# Patient Record
Sex: FEMALE | Race: WHITE | Employment: FULL TIME | URBAN - METROPOLITAN AREA
[De-identification: names, ages, dates, MRNs, and addresses within clinical notes are randomized per-mention and may not be internally consistent; named-entity substitution may affect disease eponyms.]

---

## 2017-02-27 ENCOUNTER — TELEPHONE (OUTPATIENT)
Dept: OBGYN CLINIC | Facility: CLINIC | Age: 32
End: 2017-02-27

## 2017-02-27 ENCOUNTER — OFFICE VISIT (OUTPATIENT)
Dept: INTERNAL MEDICINE CLINIC | Facility: CLINIC | Age: 32
End: 2017-02-27

## 2017-02-27 VITALS
HEART RATE: 71 BPM | SYSTOLIC BLOOD PRESSURE: 127 MMHG | BODY MASS INDEX: 40.32 KG/M2 | TEMPERATURE: 98 F | WEIGHT: 266 LBS | DIASTOLIC BLOOD PRESSURE: 85 MMHG | HEIGHT: 68 IN

## 2017-02-27 DIAGNOSIS — E66.01 MORBID OBESITY DUE TO EXCESS CALORIES (HCC): Primary | ICD-10-CM

## 2017-02-27 PROCEDURE — 99212 OFFICE O/P EST SF 10 MIN: CPT | Performed by: INTERNAL MEDICINE

## 2017-02-27 PROCEDURE — 99213 OFFICE O/P EST LOW 20 MIN: CPT | Performed by: INTERNAL MEDICINE

## 2017-02-27 NOTE — PATIENT INSTRUCTIONS
1800 calories or less  Small frequent feedings  210 minutes a week cardio  50 grams dietery protein a day minimum  Vegetables free food  Apples, oranges, carrots  Broccoli, caulifloer, cabbage, unlimited

## 2017-02-27 NOTE — PROGRESS NOTES
Pt wants counseling on wt loss  We talked for 25 minutes on basic principals  See the patient instructions  1.  Morbid obesity due to excess calories ([facility] [address]  The patient will rtc 4 weeks for monitoring  - DIETITIAN EDUCATION INITIAL, DIET (INTERNAL)

## 2017-02-27 NOTE — TELEPHONE ENCOUNTER
Pt has not had an annual since 2015. Dr. Paige Lopez will need to see pt for annual before she will give mammo order. Please help pt schedule annual. Thank you.

## 2017-03-04 ENCOUNTER — HOSPITAL ENCOUNTER (OUTPATIENT)
Age: 32
Discharge: HOME | End: 2017-03-04
Attending: EMERGENCY MEDICINE
Payer: COMMERCIAL

## 2017-03-04 VITALS
HEIGHT: 68 IN | HEART RATE: 66 BPM | TEMPERATURE: 98 F | WEIGHT: 260 LBS | DIASTOLIC BLOOD PRESSURE: 93 MMHG | RESPIRATION RATE: 16 BRPM | BODY MASS INDEX: 39.4 KG/M2 | OXYGEN SATURATION: 100 % | SYSTOLIC BLOOD PRESSURE: 152 MMHG

## 2017-03-04 DIAGNOSIS — K52.9 GASTROENTERITIS: Primary | ICD-10-CM

## 2017-03-04 PROCEDURE — 99212 OFFICE O/P EST SF 10 MIN: CPT

## 2017-03-04 PROCEDURE — 99213 OFFICE O/P EST LOW 20 MIN: CPT

## 2017-03-05 NOTE — ED PROVIDER NOTES
Patient Seen in: [facility]    History   Patient presents with:  Nausea/Vomiting/Diarrhea (gastrointestinal)    Stated Complaint: V/D LAST 3 DAYS ABDOMINAL PAIN    HPI  3 days ago patient started with vomiting and diarrhe ED Triage Vitals   BP 03/04/17 1746 152/93 mmHg   Pulse 03/04/17 1746 66   Resp 03/04/17 1746 16   Temp 03/04/17 1746 97.7 °F (36.5 °C)   Temp src 03/04/17 1746 Temporal   SpO2 03/04/17 1746 100 %   O2 Device 03/04/17 1746 None (Room air)       Current as soon as possible for a visit in 1 week  Follow up with your doctor is important, For follow-up      Medications Prescribed:  Current Discharge Medication List

## 2017-03-23 ENCOUNTER — OFFICE VISIT (OUTPATIENT)
Dept: OBGYN CLINIC | Facility: CLINIC | Age: 32
End: 2017-03-23

## 2017-03-23 ENCOUNTER — TELEPHONE (OUTPATIENT)
Dept: OBGYN CLINIC | Facility: CLINIC | Age: 32
End: 2017-03-23

## 2017-03-23 VITALS
HEART RATE: 67 BPM | WEIGHT: 265.81 LBS | BODY MASS INDEX: 40 KG/M2 | DIASTOLIC BLOOD PRESSURE: 92 MMHG | SYSTOLIC BLOOD PRESSURE: 142 MMHG

## 2017-03-23 DIAGNOSIS — Z85.42 HISTORY OF ENDOMETRIAL CANCER: Primary | ICD-10-CM

## 2017-03-23 DIAGNOSIS — Z80.9 FAMILY HISTORY OF CANCER: ICD-10-CM

## 2017-03-23 DIAGNOSIS — Z01.419 ENCOUNTER FOR GYNECOLOGICAL EXAMINATION WITHOUT ABNORMAL FINDING: Primary | ICD-10-CM

## 2017-03-23 DIAGNOSIS — N64.4 BREAST PAIN: ICD-10-CM

## 2017-03-23 DIAGNOSIS — Z85.42 HISTORY OF ENDOMETRIAL CANCER: ICD-10-CM

## 2017-03-23 PROCEDURE — 99395 PREV VISIT EST AGE 18-39: CPT | Performed by: OBSTETRICS & GYNECOLOGY

## 2017-03-23 PROCEDURE — 99212 OFFICE O/P EST SF 10 MIN: CPT | Performed by: OBSTETRICS & GYNECOLOGY

## 2017-03-23 NOTE — PROGRESS NOTES
Selwyn Peguero is a 33year old female  No LMP recorded. Patient has had a hysterectomy. Patient presents with:  Gyn Exam: Annual  She has c/o right breast pain un upper outer quad.   She is concerned due to her family history of premanopausal b by mouth every morning before breakfast., Disp: 30 capsule, Rfl: 2    ALLERGIES:    Penicillins                 Comment:Other reaction(s): PENICILLIN G POTASSIUM      Review of Systems:  Constitutional:  Denies fatigue, night sweats, hot flashes  Eyes:  de discharge  Cervix:  Normal without tenderness on motion  Uterus: normal in size, contour, position, mobility, without tenderness  Adnexa: normal without masses or tenderness  Perineum: normal  Anus: no hemorroids     Assessment & Plan:    Encounter for gyn

## 2017-03-23 NOTE — TELEPHONE ENCOUNTER
please refer for genetic testing - Rustam Davidson. She has HMO-I. She has had premenopausal endometrial cancer and has strong family history of breast cancer.

## 2017-03-24 NOTE — TELEPHONE ENCOUNTER
Joce. Patient informed her referral is in process and she can call [phone] to schedule a consult with Hoda Rosales in [facility] counseling. Please relay info.

## 2017-04-05 ENCOUNTER — OFFICE VISIT (OUTPATIENT)
Dept: INTERNAL MEDICINE CLINIC | Facility: CLINIC | Age: 32
End: 2017-04-05

## 2017-04-05 VITALS
OXYGEN SATURATION: 96 % | DIASTOLIC BLOOD PRESSURE: 89 MMHG | WEIGHT: 264.81 LBS | TEMPERATURE: 98 F | HEIGHT: 68 IN | BODY MASS INDEX: 40.13 KG/M2 | SYSTOLIC BLOOD PRESSURE: 128 MMHG | HEART RATE: 84 BPM

## 2017-04-05 DIAGNOSIS — I10 ESSENTIAL HYPERTENSION WITH GOAL BLOOD PRESSURE LESS THAN 140/90: ICD-10-CM

## 2017-04-05 DIAGNOSIS — K21.9 GASTROESOPHAGEAL REFLUX DISEASE, ESOPHAGITIS PRESENCE NOT SPECIFIED: Primary | ICD-10-CM

## 2017-04-05 DIAGNOSIS — J06.9 UPPER RESPIRATORY TRACT INFECTION, UNSPECIFIED TYPE: ICD-10-CM

## 2017-04-05 PROCEDURE — 99213 OFFICE O/P EST LOW 20 MIN: CPT | Performed by: INTERNAL MEDICINE

## 2017-04-05 PROCEDURE — 99212 OFFICE O/P EST SF 10 MIN: CPT | Performed by: INTERNAL MEDICINE

## 2017-04-05 RX ORDER — OMEPRAZOLE 20 MG/1
20 CAPSULE, DELAYED RELEASE ORAL
Qty: 30 CAPSULE | Refills: 2 | Status: SHIPPED | OUTPATIENT
Start: 2017-04-05 | End: 2017-12-15

## 2017-04-05 RX ORDER — NIFEDIPINE 90 MG/1
TABLET, FILM COATED, EXTENDED RELEASE ORAL
Qty: 90 TABLET | Refills: 0 | Status: SHIPPED | OUTPATIENT
Start: 2017-04-05 | End: 2017-12-04

## 2017-04-20 ENCOUNTER — HOSPITAL ENCOUNTER (OUTPATIENT)
Dept: MAMMOGRAPHY | Facility: HOSPITAL | Age: 32
Discharge: HOME | End: 2017-04-20
Attending: OBSTETRICS & GYNECOLOGY
Payer: COMMERCIAL

## 2017-04-20 DIAGNOSIS — N64.4 BREAST PAIN: ICD-10-CM

## 2017-04-20 DIAGNOSIS — Z80.9 FAMILY HISTORY OF CANCER: ICD-10-CM

## 2017-04-20 DIAGNOSIS — Z85.42 HISTORY OF ENDOMETRIAL CANCER: ICD-10-CM

## 2017-04-20 PROCEDURE — 77066 DX MAMMO INCL CAD BI: CPT | Performed by: RADIOLOGY

## 2017-11-20 ENCOUNTER — OFFICE VISIT (OUTPATIENT)
Dept: INTERNAL MEDICINE CLINIC | Facility: CLINIC | Age: 32
End: 2017-11-20

## 2017-11-20 VITALS
HEART RATE: 86 BPM | DIASTOLIC BLOOD PRESSURE: 101 MMHG | BODY MASS INDEX: 43 KG/M2 | SYSTOLIC BLOOD PRESSURE: 156 MMHG | WEIGHT: 282 LBS

## 2017-11-20 DIAGNOSIS — G44.89 OTHER HEADACHE SYNDROME: ICD-10-CM

## 2017-11-20 DIAGNOSIS — I10 ESSENTIAL HYPERTENSION WITH GOAL BLOOD PRESSURE LESS THAN 140/90: Primary | ICD-10-CM

## 2017-11-20 DIAGNOSIS — R20.0 LEG NUMBNESS: ICD-10-CM

## 2017-11-20 PROCEDURE — 99212 OFFICE O/P EST SF 10 MIN: CPT | Performed by: INTERNAL MEDICINE

## 2017-11-20 PROCEDURE — 99214 OFFICE O/P EST MOD 30 MIN: CPT | Performed by: INTERNAL MEDICINE

## 2017-11-20 RX ORDER — LISINOPRIL AND HYDROCHLOROTHIAZIDE 25; 20 MG/1; MG/1
1 TABLET ORAL DAILY
Qty: 90 TABLET | Refills: 3 | Status: SHIPPED | OUTPATIENT
Start: 2017-11-20 | End: 2018-11-20

## 2017-11-20 NOTE — PROGRESS NOTES
Lynnda Felty is a 27year old female. HPI:   1. Essential hypertension with goal blood pressure less than 140/90  Patient has been following low salt diet and has been taking anti-hypertensive prescriptions as prescribed.  Blood pressure has been kat 42.88 kg/m²   GENERAL: well developed, well nourished,in no apparent distress  SKIN: no rashes,no suspicious lesions  HEENT: atraumatic, normocephalic,ears and throat are clear  NECK: supple,no adenopathy,no bruits  LUNGS: clear to auscultation  CARDIO: RR

## 2017-12-04 ENCOUNTER — OFFICE VISIT (OUTPATIENT)
Dept: INTERNAL MEDICINE CLINIC | Facility: CLINIC | Age: 32
End: 2017-12-04

## 2017-12-04 VITALS
BODY MASS INDEX: 42.28 KG/M2 | SYSTOLIC BLOOD PRESSURE: 129 MMHG | RESPIRATION RATE: 20 BRPM | DIASTOLIC BLOOD PRESSURE: 82 MMHG | HEART RATE: 83 BPM | WEIGHT: 279 LBS | HEIGHT: 68 IN | TEMPERATURE: 98 F

## 2017-12-04 DIAGNOSIS — C55 UTERINE CARCINOMA (HCC): ICD-10-CM

## 2017-12-04 DIAGNOSIS — R73.9 ELEVATED BLOOD SUGAR: ICD-10-CM

## 2017-12-04 DIAGNOSIS — Z00.00 PE (PHYSICAL EXAM), ROUTINE: Primary | ICD-10-CM

## 2017-12-04 DIAGNOSIS — E66.9 OBESITY (BMI 30-39.9): ICD-10-CM

## 2017-12-04 DIAGNOSIS — I10 ESSENTIAL HYPERTENSION WITH GOAL BLOOD PRESSURE LESS THAN 140/90: ICD-10-CM

## 2017-12-04 PROCEDURE — 99395 PREV VISIT EST AGE 18-39: CPT | Performed by: INTERNAL MEDICINE

## 2017-12-04 PROCEDURE — 99213 OFFICE O/P EST LOW 20 MIN: CPT | Performed by: INTERNAL MEDICINE

## 2017-12-04 PROCEDURE — 99212 OFFICE O/P EST SF 10 MIN: CPT | Performed by: INTERNAL MEDICINE

## 2017-12-04 NOTE — PROGRESS NOTES
HPI:    Patient ID: Ajith Cárdenas is a 27year old female.   Patient patient presents today for physical exam, states doing well otherwise, denies chest pain, shortness of breath, dyspnea on exertion or heart palpitations also denies nausea, vomiting, morning before breakfast. Disp: 30 capsule Rfl: 2     Allergies:  Penicillins                 Comment:Other reaction(s): PENICILLIN G POTASSIUM   PHYSICAL EXAM:   Physical Exam   Constitutional: She is oriented to person, place, and time.  She appears well- cardiovascular exercise /walking as tolerated   Complete labs as ordered , preventative health maintenance discussed -pelvic breast exam  With gyne hx uterine  Ca - hx alex and baker stable  Seeing Dr Blair  Patient verbalized understanding of all instructions

## 2017-12-15 DIAGNOSIS — K21.9 GASTROESOPHAGEAL REFLUX DISEASE, ESOPHAGITIS PRESENCE NOT SPECIFIED: ICD-10-CM

## 2017-12-15 RX ORDER — OMEPRAZOLE 20 MG/1
CAPSULE, DELAYED RELEASE ORAL
Qty: 30 CAPSULE | Refills: 1 | Status: SHIPPED | OUTPATIENT
Start: 2017-12-15 | End: 2018-02-27

## 2017-12-15 NOTE — TELEPHONE ENCOUNTER
Refill Protocol Appointment Criteria  · Appointment scheduled in the past 12 months or in the next 3 months  Recent Outpatient Visits            1 week ago PE (physical exam), routine    Cat Blandon MD

## 2017-12-26 ENCOUNTER — NURSE TRIAGE (OUTPATIENT)
Dept: INTERNAL MEDICINE CLINIC | Facility: CLINIC | Age: 32
End: 2017-12-26

## 2017-12-26 ENCOUNTER — TELEPHONE (OUTPATIENT)
Dept: INTERNAL MEDICINE CLINIC | Facility: CLINIC | Age: 32
End: 2017-12-26

## 2017-12-26 RX ORDER — ACYCLOVIR 50 MG/G
CREAM TOPICAL
Qty: 1 TUBE | Refills: 0 | Status: SHIPPED | OUTPATIENT
Start: 2017-12-26 | End: 2018-07-17 | Stop reason: ALTCHOICE

## 2017-12-26 NOTE — TELEPHONE ENCOUNTER
Action Requested: Summary for Provider     []  Critical Lab, Recommendations Needed  [] Need Additional Advice  []   FYI    []   Need Orders  [x] Need Medications Sent to Pharmacy  []  Other     SUMMARY: MED NEEDED, cold sore    Pt states she has a cold so

## 2017-12-26 NOTE — TELEPHONE ENCOUNTER
Notified pt's mom prescription sent ot pharmacy as requested and to have pt call back if any questions.

## 2017-12-28 ENCOUNTER — NURSE TRIAGE (OUTPATIENT)
Dept: OTHER | Age: 32
End: 2017-12-28

## 2017-12-28 NOTE — TELEPHONE ENCOUNTER
Noted - keep apt  On 1/2 - fluids avoid constipation and heavy meals -  If any worsening  sy - IC or ER

## 2018-01-02 ENCOUNTER — OFFICE VISIT (OUTPATIENT)
Dept: INTERNAL MEDICINE CLINIC | Facility: CLINIC | Age: 33
End: 2018-01-02

## 2018-01-02 VITALS
SYSTOLIC BLOOD PRESSURE: 143 MMHG | HEIGHT: 68 IN | WEIGHT: 287.88 LBS | RESPIRATION RATE: 20 BRPM | BODY MASS INDEX: 43.63 KG/M2 | TEMPERATURE: 98 F | DIASTOLIC BLOOD PRESSURE: 93 MMHG | HEART RATE: 80 BPM

## 2018-01-02 DIAGNOSIS — I10 ESSENTIAL HYPERTENSION WITH GOAL BLOOD PRESSURE LESS THAN 140/90: Primary | ICD-10-CM

## 2018-01-02 DIAGNOSIS — K64.9 HEMORRHOIDS, UNSPECIFIED HEMORRHOID TYPE: ICD-10-CM

## 2018-01-02 DIAGNOSIS — K21.9 GASTROESOPHAGEAL REFLUX DISEASE, ESOPHAGITIS PRESENCE NOT SPECIFIED: ICD-10-CM

## 2018-01-02 DIAGNOSIS — E66.9 OBESITY (BMI 30-39.9): ICD-10-CM

## 2018-01-02 PROCEDURE — 99214 OFFICE O/P EST MOD 30 MIN: CPT | Performed by: INTERNAL MEDICINE

## 2018-01-02 PROCEDURE — 99212 OFFICE O/P EST SF 10 MIN: CPT | Performed by: INTERNAL MEDICINE

## 2018-01-02 NOTE — PROGRESS NOTES
HPI:    Patient ID: Claudean Smoker is a 27year old female.     Rectal Bleeding   This is a new ( few   weeks ago  with  biger BM and was pushing  also had some diarrhea  - for 2- 3  days -   all  sy  resoplved  in 2-3  days    now feewls  good deneis a well-developed and well-nourished. No distress. HENT:   Head: Normocephalic and atraumatic.    Right Ear: Tympanic membrane, external ear and ear canal normal.   Left Ear: Tympanic membrane, external ear and ear canal normal.   Nose: Nose normal. Right si Patient verbalizes understanding  cpm labs in am   recheck   In  Office in 2 weeks     Obesity (bmi 30-39. 9)  Eat healthy, well balanced meals   Reduce daily calorie intake  (1500 calories per day)   Reach and maintain a healthy weight   Reduce salt, fat

## 2018-02-06 ENCOUNTER — OFFICE VISIT (OUTPATIENT)
Dept: INTERNAL MEDICINE CLINIC | Facility: CLINIC | Age: 33
End: 2018-02-06

## 2018-02-06 VITALS
WEIGHT: 277 LBS | HEART RATE: 77 BPM | RESPIRATION RATE: 20 BRPM | HEIGHT: 68 IN | BODY MASS INDEX: 41.98 KG/M2 | DIASTOLIC BLOOD PRESSURE: 89 MMHG | SYSTOLIC BLOOD PRESSURE: 144 MMHG | TEMPERATURE: 97 F | OXYGEN SATURATION: 98 %

## 2018-02-06 DIAGNOSIS — I10 ESSENTIAL HYPERTENSION WITH GOAL BLOOD PRESSURE LESS THAN 140/90: ICD-10-CM

## 2018-02-06 DIAGNOSIS — R05.9 COUGH: Primary | ICD-10-CM

## 2018-02-06 PROCEDURE — 99214 OFFICE O/P EST MOD 30 MIN: CPT | Performed by: INTERNAL MEDICINE

## 2018-02-06 PROCEDURE — 99212 OFFICE O/P EST SF 10 MIN: CPT | Performed by: INTERNAL MEDICINE

## 2018-02-06 RX ORDER — FEXOFENADINE HCL 180 MG/1
180 TABLET ORAL DAILY
Qty: 30 TABLET | Refills: 0 | Status: SHIPPED | OUTPATIENT
Start: 2018-02-06 | End: 2018-07-02 | Stop reason: ALTCHOICE

## 2018-02-06 RX ORDER — AZITHROMYCIN 250 MG/1
TABLET, FILM COATED ORAL
Qty: 6 TABLET | Refills: 0 | Status: SHIPPED | OUTPATIENT
Start: 2018-02-06 | End: 2018-07-02 | Stop reason: ALTCHOICE

## 2018-02-06 NOTE — PROGRESS NOTES
HPI:    Patient ID: Hakeem Jackson is a 27year old female. Cough   This is a new problem. The current episode started in the past 7 days. The problem has been gradually worsening. The cough is productive of sputum.  Associated symptoms include nasal EVERY MORNING BEFORE BREAKFAST Disp: 30 capsule Rfl: 1   Lisinopril-Hydrochlorothiazide 20-25 MG Oral Tab Take 1 tablet by mouth daily. Disp: 90 tablet Rfl: 3   Acetaminophen (TYLENOL EXTRA STRENGTH OR) Take by mouth.  Disp:  Rfl:      Allergies:  Penicilli affect. Her behavior is normal.   Nursing note and vitals reviewed. Blood pressure 144/89, pulse 77, temperature (!) 96.8 °F (36 °C), temperature source Oral, resp.  rate 20, height 5' 8\" (1.727 m), weight 277 lb (125.6 kg), SpO2 98 %, not currently marline

## 2018-02-27 DIAGNOSIS — K21.9 GASTROESOPHAGEAL REFLUX DISEASE, ESOPHAGITIS PRESENCE NOT SPECIFIED: ICD-10-CM

## 2018-02-27 RX ORDER — OMEPRAZOLE 20 MG/1
CAPSULE, DELAYED RELEASE ORAL
Qty: 30 CAPSULE | Refills: 0 | Status: SHIPPED | OUTPATIENT
Start: 2018-02-27 | End: 2018-06-28

## 2018-04-07 ENCOUNTER — NURSE TRIAGE (OUTPATIENT)
Dept: OTHER | Age: 33
End: 2018-04-07

## 2018-04-07 NOTE — TELEPHONE ENCOUNTER
Action Requested: Summary for Provider     []  Critical Lab, Recommendations Needed  [] Need Additional Advice  [x]   FYI    []   Need Orders  [] Need Medications Sent to Pharmacy  []  Other     SUMMARY:   Spoke with pt & stated she was seen by Dr Venkata Jane 2-6-1

## 2018-06-28 DIAGNOSIS — K21.9 GASTROESOPHAGEAL REFLUX DISEASE, ESOPHAGITIS PRESENCE NOT SPECIFIED: ICD-10-CM

## 2018-06-28 RX ORDER — OMEPRAZOLE 20 MG/1
CAPSULE, DELAYED RELEASE ORAL
Qty: 30 CAPSULE | Refills: 2 | Status: SHIPPED | OUTPATIENT
Start: 2018-06-28 | End: 2018-10-25

## 2018-06-28 NOTE — TELEPHONE ENCOUNTER
Refilled per protocol.   Refill Protocol Appointment Criteria  · Appointment scheduled in the past 12 months or in the next 3 months  Recent Outpatient Visits            4 months ago [facility], [facility], [address] [place] [address], Paula Chavez MD

## 2018-07-02 ENCOUNTER — OFFICE VISIT (OUTPATIENT)
Dept: INTERNAL MEDICINE CLINIC | Facility: CLINIC | Age: 33
End: 2018-07-02

## 2018-07-02 ENCOUNTER — NURSE TRIAGE (OUTPATIENT)
Dept: OTHER | Age: 33
End: 2018-07-02

## 2018-07-02 VITALS
TEMPERATURE: 98 F | SYSTOLIC BLOOD PRESSURE: 147 MMHG | HEART RATE: 73 BPM | HEIGHT: 68 IN | DIASTOLIC BLOOD PRESSURE: 84 MMHG | WEIGHT: 280 LBS | BODY MASS INDEX: 42.44 KG/M2

## 2018-07-02 DIAGNOSIS — I10 ESSENTIAL HYPERTENSION WITH GOAL BLOOD PRESSURE LESS THAN 140/90: ICD-10-CM

## 2018-07-02 DIAGNOSIS — S46.912A MUSCLE STRAIN OF LEFT UPPER ARM, INITIAL ENCOUNTER: Primary | ICD-10-CM

## 2018-07-02 PROCEDURE — 99213 OFFICE O/P EST LOW 20 MIN: CPT | Performed by: INTERNAL MEDICINE

## 2018-07-02 PROCEDURE — 99212 OFFICE O/P EST SF 10 MIN: CPT | Performed by: INTERNAL MEDICINE

## 2018-07-02 RX ORDER — CYCLOBENZAPRINE HCL 10 MG
10 TABLET ORAL NIGHTLY
Qty: 5 TABLET | Refills: 0 | Status: SHIPPED | OUTPATIENT
Start: 2018-07-02 | End: 2018-07-17 | Stop reason: ALTCHOICE

## 2018-07-02 NOTE — PROGRESS NOTES
Selwyn Peguero is a 27year old female.   Patient presents with:  Arm Pain: L arm pain began yesterday due to heavy lifting      HPI:   Pt comes as an urgent visit   C/c left arm pain x one day   C/o left arm pain -- pain is 4/10 only if moving it   Too wheezing  CARDIOVASCULAR: denies chest pain on exertion, palpitations, swelling in feet  GI: denies abdominal pain and denies heartburn, nausea or vomiting  MUS: No back pain, joint pain,+ muscle pain- left arm   NEURO: denies headaches , anxiety, depressi

## 2018-07-02 NOTE — TELEPHONE ENCOUNTER
She can take Tylenol 2 tabs 3 times daily for maximum 6 tabs daily. She can take ibuprofen 200 mg 3 tabs, 3 times daily for maximum 9 tabs daily. Ice 20 min 3 times daily  Schedule an appointment with any provider.

## 2018-07-02 NOTE — TELEPHONE ENCOUNTER
Action Requested: Summary for Provider     []  Critical Lab, Recommendations Needed  [x] Need Additional Advice  []   FYI    []   Need Orders  [] Need Medications Sent to Pharmacy  []  Other     SUMMARY: Patient requesting MD advice for left arm pain after Reason for Disposition  • Caused by overuse from recent vigorous activity (e.g., sports, lifting, physical work)    Protocols used: ARM PAIN-A-OH

## 2018-07-02 NOTE — PATIENT INSTRUCTIONS
Advised ibuprofen 400 mg twice to 3 times a day after meals for the next 4 -5 days  Advised patient to get her blood work drawn  We will give a few muscle relaxants only to take at night and she is aware of the side effects and to take it only before she g · Rest the area even if medicines are controlling the pain. Rest  · Rest the injured area by not using it for 24 hours. · When you’re ready, return slowly to your normal activities. Rest the injured area often.   · Don’t use or walk on an injured limb if

## 2018-07-17 ENCOUNTER — OFFICE VISIT (OUTPATIENT)
Dept: INTERNAL MEDICINE CLINIC | Facility: CLINIC | Age: 33
End: 2018-07-17
Payer: COMMERCIAL

## 2018-07-17 VITALS
DIASTOLIC BLOOD PRESSURE: 96 MMHG | WEIGHT: 279 LBS | HEART RATE: 84 BPM | HEIGHT: 68 IN | BODY MASS INDEX: 42.28 KG/M2 | TEMPERATURE: 98 F | SYSTOLIC BLOOD PRESSURE: 145 MMHG

## 2018-07-17 DIAGNOSIS — I10 ESSENTIAL HYPERTENSION WITH GOAL BLOOD PRESSURE LESS THAN 140/90: Primary | ICD-10-CM

## 2018-07-17 DIAGNOSIS — H66.90 ACUTE OTITIS MEDIA, UNSPECIFIED OTITIS MEDIA TYPE: ICD-10-CM

## 2018-07-17 PROCEDURE — 99213 OFFICE O/P EST LOW 20 MIN: CPT | Performed by: INTERNAL MEDICINE

## 2018-07-17 PROCEDURE — 99212 OFFICE O/P EST SF 10 MIN: CPT | Performed by: INTERNAL MEDICINE

## 2018-07-17 RX ORDER — CLINDAMYCIN HYDROCHLORIDE 300 MG/1
300 CAPSULE ORAL 3 TIMES DAILY
Qty: 15 CAPSULE | Refills: 0 | Status: SHIPPED | OUTPATIENT
Start: 2018-07-17 | End: 2018-12-21 | Stop reason: ALTCHOICE

## 2018-07-17 NOTE — PROGRESS NOTES
Jason Floyd is a 27year old female.   Patient presents with:  Neck Pain: x2 days      HPI:   Pt comes as an urgent visit  C/c neck pain   C/o neck pain - left side  Going on 3 days -- last night was the worse and on the first day felt alittle nausea rarely       REVIEW OF SYSTEMS:   GENERAL HEALTH: + fevers, + chills, + sweats, + fatigue  VISION: No recent vision problems, blurry vision or double vision  HEENT: No decreased hearing + left ear pain, no  nasal congestion, + sore throat- more on the left the  also gave number to ENT in case she is not feeling better or she can always follow-up with PCP number for the allergist  Reminded patient to get blood work done        The patient indicates understanding of these issues and agrees to the plan.   No Fol

## 2018-08-23 ENCOUNTER — LAB ENCOUNTER (OUTPATIENT)
Dept: LAB | Age: 33
End: 2018-08-23
Attending: INTERNAL MEDICINE
Payer: COMMERCIAL

## 2018-08-23 DIAGNOSIS — Z00.00 PE (PHYSICAL EXAM), ROUTINE: ICD-10-CM

## 2018-08-23 DIAGNOSIS — I10 ESSENTIAL HYPERTENSION WITH GOAL BLOOD PRESSURE LESS THAN 140/90: ICD-10-CM

## 2018-08-23 DIAGNOSIS — R73.9 ELEVATED BLOOD SUGAR: ICD-10-CM

## 2018-08-23 DIAGNOSIS — C55 UTERINE CARCINOMA (HCC): ICD-10-CM

## 2018-08-23 LAB
ALBUMIN SERPL BCP-MCNC: 4 G/DL (ref 3.5–4.8)
ALBUMIN/GLOB SERPL: 1.2 {RATIO} (ref 1–2)
ALP SERPL-CCNC: 39 U/L (ref 32–100)
ALT SERPL-CCNC: 24 U/L (ref 14–54)
ANION GAP SERPL CALC-SCNC: 7 MMOL/L (ref 0–18)
AST SERPL-CCNC: 16 U/L (ref 15–41)
BASOPHILS # BLD: 0.1 K/UL (ref 0–0.2)
BASOPHILS NFR BLD: 1 %
BILIRUB SERPL-MCNC: 0.6 MG/DL (ref 0.3–1.2)
BUN SERPL-MCNC: 9 MG/DL (ref 8–20)
BUN/CREAT SERPL: 12.9 (ref 10–20)
CALCIUM SERPL-MCNC: 9 MG/DL (ref 8.5–10.5)
CHLORIDE SERPL-SCNC: 105 MMOL/L (ref 95–110)
CHOLEST SERPL-MCNC: 112 MG/DL (ref 110–200)
CO2 SERPL-SCNC: 26 MMOL/L (ref 22–32)
CREAT SERPL-MCNC: 0.7 MG/DL (ref 0.5–1.5)
EOSINOPHIL # BLD: 0.2 K/UL (ref 0–0.7)
EOSINOPHIL NFR BLD: 2 %
ERYTHROCYTE [DISTWIDTH] IN BLOOD BY AUTOMATED COUNT: 13.6 % (ref 11–15)
GLOBULIN PLAS-MCNC: 3.3 G/DL (ref 2.5–3.7)
GLUCOSE SERPL-MCNC: 95 MG/DL (ref 70–99)
HCT VFR BLD AUTO: 37.3 % (ref 35–48)
HDLC SERPL-MCNC: 26 MG/DL
HGB BLD-MCNC: 12.7 G/DL (ref 12–16)
HYALINE CASTS #/AREA URNS AUTO: 8 /LPF
LDLC SERPL DIRECT ASSAY-MCNC: 29 MG/DL (ref 0–99)
LYMPHOCYTES # BLD: 2.1 K/UL (ref 1–4)
LYMPHOCYTES NFR BLD: 25 %
MCH RBC QN AUTO: 29.8 PG (ref 27–32)
MCHC RBC AUTO-ENTMCNC: 34 G/DL (ref 32–37)
MCV RBC AUTO: 87.6 FL (ref 80–100)
MONOCYTES # BLD: 0.5 K/UL (ref 0–1)
MONOCYTES NFR BLD: 6 %
NEUTROPHILS # BLD AUTO: 5.5 K/UL (ref 1.8–7.7)
NEUTROPHILS NFR BLD: 66 %
NONHDLC SERPL-MCNC: 86 MG/DL
OSMOLALITY UR CALC.SUM OF ELEC: 284 MOSM/KG (ref 275–295)
PATIENT FASTING: YES
PLATELET # BLD AUTO: 276 K/UL (ref 140–400)
PMV BLD AUTO: 8 FL (ref 7.4–10.3)
POTASSIUM SERPL-SCNC: 3.6 MMOL/L (ref 3.3–5.1)
PROT SERPL-MCNC: 7.3 G/DL (ref 5.9–8.4)
RBC # BLD AUTO: 4.25 M/UL (ref 3.7–5.4)
RBC #/AREA URNS AUTO: 3 /HPF
SODIUM SERPL-SCNC: 138 MMOL/L (ref 136–144)
TRIGL SERPL-MCNC: 456 MG/DL (ref 1–149)
TSH SERPL-ACNC: 1.41 UIU/ML (ref 0.45–5.33)
WBC # BLD AUTO: 8.4 K/UL (ref 4–11)
WBC #/AREA URNS AUTO: 1 /HPF

## 2018-08-23 PROCEDURE — 80061 LIPID PANEL: CPT

## 2018-08-23 PROCEDURE — 36415 COLL VENOUS BLD VENIPUNCTURE: CPT

## 2018-08-23 PROCEDURE — 80053 COMPREHEN METABOLIC PANEL: CPT

## 2018-08-23 PROCEDURE — 84443 ASSAY THYROID STIM HORMONE: CPT

## 2018-08-23 PROCEDURE — 81015 MICROSCOPIC EXAM OF URINE: CPT

## 2018-08-23 PROCEDURE — 83036 HEMOGLOBIN GLYCOSYLATED A1C: CPT

## 2018-08-23 PROCEDURE — 83721 ASSAY OF BLOOD LIPOPROTEIN: CPT

## 2018-08-23 PROCEDURE — 85025 COMPLETE CBC W/AUTO DIFF WBC: CPT

## 2018-08-24 LAB — HBA1C MFR BLD: 5.6 % (ref 4–6)

## 2018-08-27 ENCOUNTER — TELEPHONE (OUTPATIENT)
Dept: INTERNAL MEDICINE CLINIC | Facility: CLINIC | Age: 33
End: 2018-08-27

## 2018-08-27 RX ORDER — FENOFIBRATE 145 MG/1
145 TABLET, COATED ORAL DAILY
Qty: 90 TABLET | Refills: 0 | Status: SHIPPED | OUTPATIENT
Start: 2018-08-27 | End: 2018-12-21 | Stop reason: ALTCHOICE

## 2018-08-27 NOTE — PROGRESS NOTES
Please call patient with blood test results. Kidney and liver function are normal, no anemia.    Cholesterol is normal -except very elevated triglycerides-that is some worsening 456 at the high risk of pancreatitis inflammation of the pancreatic gland du

## 2018-08-29 NOTE — TELEPHONE ENCOUNTER
Notes recorded by Leslee Delgado RN on 8/28/2018 at 7:22 PM CDT  Fanta Velasquez, notified of new medication sent to pharmacy also  ------    Notes recorded by Celeste Mejia MD on 8/27/2018 at 8:34 AM CDT  Please call patient with blood test results.

## 2018-10-25 DIAGNOSIS — K21.9 GASTROESOPHAGEAL REFLUX DISEASE, ESOPHAGITIS PRESENCE NOT SPECIFIED: ICD-10-CM

## 2018-10-25 RX ORDER — OMEPRAZOLE 20 MG/1
CAPSULE, DELAYED RELEASE ORAL
Qty: 30 CAPSULE | Refills: 1 | Status: SHIPPED | OUTPATIENT
Start: 2018-10-25 | End: 2018-12-27

## 2018-12-12 ENCOUNTER — HOSPITAL ENCOUNTER (EMERGENCY)
Facility: HOSPITAL | Age: 33
Discharge: HOME | End: 2018-12-12
Attending: EMERGENCY MEDICINE
Payer: COMMERCIAL

## 2018-12-12 VITALS
HEIGHT: 68 IN | BODY MASS INDEX: 41.68 KG/M2 | RESPIRATION RATE: 16 BRPM | SYSTOLIC BLOOD PRESSURE: 158 MMHG | WEIGHT: 275 LBS | HEART RATE: 86 BPM | OXYGEN SATURATION: 97 % | DIASTOLIC BLOOD PRESSURE: 93 MMHG | TEMPERATURE: 98 F

## 2018-12-12 DIAGNOSIS — H81.10 BENIGN PAROXYSMAL POSITIONAL VERTIGO, UNSPECIFIED LATERALITY: Primary | ICD-10-CM

## 2018-12-12 PROCEDURE — 96374 THER/PROPH/DIAG INJ IV PUSH: CPT

## 2018-12-12 PROCEDURE — 85025 COMPLETE CBC W/AUTO DIFF WBC: CPT | Performed by: EMERGENCY MEDICINE

## 2018-12-12 PROCEDURE — 96361 HYDRATE IV INFUSION ADD-ON: CPT

## 2018-12-12 PROCEDURE — 80048 BASIC METABOLIC PNL TOTAL CA: CPT | Performed by: EMERGENCY MEDICINE

## 2018-12-12 PROCEDURE — 99284 EMERGENCY DEPT VISIT MOD MDM: CPT

## 2018-12-12 PROCEDURE — 80048 BASIC METABOLIC PNL TOTAL CA: CPT

## 2018-12-12 PROCEDURE — 85025 COMPLETE CBC W/AUTO DIFF WBC: CPT

## 2018-12-12 PROCEDURE — 93005 ELECTROCARDIOGRAM TRACING: CPT

## 2018-12-12 PROCEDURE — 93010 ELECTROCARDIOGRAM REPORT: CPT | Performed by: INTERNAL MEDICINE

## 2018-12-12 RX ORDER — MECLIZINE HYDROCHLORIDE 25 MG/1
25 TABLET ORAL ONCE
Status: COMPLETED | OUTPATIENT
Start: 2018-12-12 | End: 2018-12-12

## 2018-12-12 RX ORDER — ONDANSETRON 2 MG/ML
4 INJECTION INTRAMUSCULAR; INTRAVENOUS ONCE
Status: COMPLETED | OUTPATIENT
Start: 2018-12-12 | End: 2018-12-12

## 2018-12-12 RX ORDER — MECLIZINE HYDROCHLORIDE 25 MG/1
25 TABLET ORAL EVERY 6 HOURS PRN
Qty: 20 TABLET | Refills: 0 | Status: SHIPPED | OUTPATIENT
Start: 2018-12-12 | End: 2018-12-19

## 2018-12-12 NOTE — ED INITIAL ASSESSMENT (HPI)
Pt woke with dizziness today that has gotten worse since the morning. Pt reports some increase with dizziness with turning head and when standing up. Nausea without vomiting. Pt did report sweating earlier with the dizziness. Pt denies pain.

## 2018-12-12 NOTE — ED NOTES
Presents to ER with mother c/o vertigo-like symptoms : room-spinning dizziness and nausea. Denies recent illness. No c/o chest discomfort or dyspnea. Medicated as ordered by Kwasi Jacobsen - states she is already feeling better.  Took own Nifedipine from home (mis

## 2018-12-12 NOTE — ED PROVIDER NOTES
Patient Seen in: [facility] Emergency Department    History   Patient presents with:  Dizziness (neurologic)  Nausea    Stated Complaint: dizzy nausea    HPI    The patient is a 15-year-old female who presents with dizziness upon standing up out o Eyes: Conjunctivae and EOM are normal. Pupils are equal, round, and reactive to light. Right eye exhibits no nystagmus. Left eye exhibits no nystagmus. Neck: Normal range of motion. Neck supple. No JVD present.    Cardiovascular: Normal rate, regular rh RAINBOW DRAW LAVENDER TALL (BNP)   CBC W/ DIFFERENTIAL     EKG    Rate, intervals and axes as noted on EKG Report.   Rate: 71  Rhythm: Sinus Rhythm  Reading: No ST elevation or arrhythmia              Patient feels much better, dizziness resolved after me

## 2018-12-12 NOTE — ED NOTES
Dizziness significantly improved since arrival to ER. Alert and oriented, ambulatory with steady gait at time of discharge. To follow-up with Dr Sandra Carter as discussed.

## 2018-12-21 ENCOUNTER — OFFICE VISIT (OUTPATIENT)
Dept: INTERNAL MEDICINE CLINIC | Facility: CLINIC | Age: 33
End: 2018-12-21
Payer: COMMERCIAL

## 2018-12-21 VITALS
SYSTOLIC BLOOD PRESSURE: 138 MMHG | HEART RATE: 75 BPM | HEIGHT: 68 IN | WEIGHT: 281 LBS | BODY MASS INDEX: 42.59 KG/M2 | DIASTOLIC BLOOD PRESSURE: 90 MMHG | TEMPERATURE: 98 F

## 2018-12-21 DIAGNOSIS — E66.01 MORBID OBESITY (HCC): ICD-10-CM

## 2018-12-21 DIAGNOSIS — R42 DIZZINESS: Primary | ICD-10-CM

## 2018-12-21 DIAGNOSIS — I10 ESSENTIAL HYPERTENSION WITH GOAL BLOOD PRESSURE LESS THAN 140/90: ICD-10-CM

## 2018-12-21 PROCEDURE — 99214 OFFICE O/P EST MOD 30 MIN: CPT | Performed by: INTERNAL MEDICINE

## 2018-12-21 PROCEDURE — 99212 OFFICE O/P EST SF 10 MIN: CPT | Performed by: INTERNAL MEDICINE

## 2018-12-21 RX ORDER — LISINOPRIL AND HYDROCHLOROTHIAZIDE 25; 20 MG/1; MG/1
1 TABLET ORAL DAILY
COMMUNITY
End: 2019-01-24

## 2018-12-21 RX ORDER — FENOFIBRATE 145 MG/1
145 TABLET, COATED ORAL DAILY
COMMUNITY
End: 2019-01-02

## 2018-12-21 NOTE — PROGRESS NOTES
Morales Modi is a 26year old female. Patient presents with:  ER F/U: 12/12/18 due to dizzy spells. per patient has not had any dizziness since Monday.        HPI:   Pt comes for f/u  C/c dizziness  C/o dizziness x 4 days and 10 days ago went to ER a fatigue  VISION: No recent vision problems, blurry vision or double vision  HEENT: No decreased hearing ear pain nasal congestion or sore throat  RESPIRATORY: denies shortness of breath, cough, wheezing  CARDIOVASCULAR: denies chest pain on exertion, palpi

## 2018-12-21 NOTE — PATIENT INSTRUCTIONS
Dizziness (Vertigo) and Balance Problems: Staying Safe     Replace burned-out light bulbs to keep your home safe and well lit. Falls or accidents can lead to pain, broken bones, and fear of future falls.  Protect yourself and others by preparing for e · Take public transportation. · Walk to stores and other places when you can. Asking for help  Don't be afraid to ask for help running errands, cooking meals, and doing exercise.  Whether it's a friend, loved one, neighbor, or stranger on the street, reina parikh Syncope is fainting that happens when the brain doesn’t get enough oxygen-rich blood. It can be caused by low heart rate or low blood pressure. This is called vasovagal syncope. It can also be caused by sitting or standing up too quickly.  This is called or

## 2018-12-27 ENCOUNTER — OFFICE VISIT (OUTPATIENT)
Dept: INTERNAL MEDICINE CLINIC | Facility: CLINIC | Age: 33
End: 2018-12-27
Payer: COMMERCIAL

## 2018-12-27 ENCOUNTER — NURSE TRIAGE (OUTPATIENT)
Dept: OTHER | Age: 33
End: 2018-12-27

## 2018-12-27 VITALS
HEART RATE: 96 BPM | HEIGHT: 68 IN | RESPIRATION RATE: 16 BRPM | TEMPERATURE: 100 F | BODY MASS INDEX: 41.37 KG/M2 | WEIGHT: 273 LBS | DIASTOLIC BLOOD PRESSURE: 77 MMHG | SYSTOLIC BLOOD PRESSURE: 110 MMHG

## 2018-12-27 DIAGNOSIS — R05.9 COUGH: Primary | ICD-10-CM

## 2018-12-27 DIAGNOSIS — R50.9 FEVER, UNSPECIFIED FEVER CAUSE: ICD-10-CM

## 2018-12-27 PROCEDURE — 99212 OFFICE O/P EST SF 10 MIN: CPT | Performed by: INTERNAL MEDICINE

## 2018-12-27 PROCEDURE — 99214 OFFICE O/P EST MOD 30 MIN: CPT | Performed by: INTERNAL MEDICINE

## 2018-12-27 RX ORDER — AZITHROMYCIN 250 MG/1
TABLET, FILM COATED ORAL
Qty: 6 TABLET | Refills: 0 | Status: SHIPPED | OUTPATIENT
Start: 2018-12-27 | End: 2019-10-15

## 2018-12-27 RX ORDER — OMEPRAZOLE 20 MG/1
20 CAPSULE, DELAYED RELEASE ORAL
COMMUNITY
End: 2019-10-15

## 2018-12-27 NOTE — TELEPHONE ENCOUNTER
Action Requested: Summary for Provider     []  Critical Lab, Recommendations Needed  [] Need Additional Advice  []   FYI    []   Need Orders  [] Need Medications Sent to Pharmacy  []  Other     SUMMARY: appt scheduled today with Dr. Mely Jin for cough, na

## 2018-12-28 ENCOUNTER — TELEPHONE (OUTPATIENT)
Dept: INTERNAL MEDICINE CLINIC | Facility: CLINIC | Age: 33
End: 2018-12-28

## 2018-12-28 NOTE — TELEPHONE ENCOUNTER
Message # [phone]         2018 06:13p   [MARICELAD]  To:  From:  DIOR Becker MD:  Phone#:  ----------------------------------------------------------------------  BERNABE DANG   83 RE VOMITING  Paged at number :  PAGE: [phone]

## 2018-12-29 ENCOUNTER — TELEPHONE (OUTPATIENT)
Dept: INTERNAL MEDICINE CLINIC | Facility: CLINIC | Age: 33
End: 2018-12-29

## 2018-12-29 NOTE — TELEPHONE ENCOUNTER
Pt called stating she needs more time off of work.   Pt states she wants letter to return back to work Monday 12/31

## 2018-12-31 ENCOUNTER — APPOINTMENT (OUTPATIENT)
Dept: GENERAL RADIOLOGY | Age: 33
End: 2018-12-31
Attending: NURSE PRACTITIONER
Payer: COMMERCIAL

## 2018-12-31 ENCOUNTER — HOSPITAL ENCOUNTER (OUTPATIENT)
Age: 33
Discharge: HOME | End: 2018-12-31
Payer: COMMERCIAL

## 2018-12-31 ENCOUNTER — TELEPHONE (OUTPATIENT)
Dept: INTERNAL MEDICINE CLINIC | Facility: CLINIC | Age: 33
End: 2018-12-31

## 2018-12-31 VITALS
SYSTOLIC BLOOD PRESSURE: 149 MMHG | TEMPERATURE: 98 F | HEIGHT: 68 IN | HEART RATE: 92 BPM | BODY MASS INDEX: 41.68 KG/M2 | RESPIRATION RATE: 18 BRPM | WEIGHT: 275 LBS | OXYGEN SATURATION: 97 % | DIASTOLIC BLOOD PRESSURE: 91 MMHG

## 2018-12-31 DIAGNOSIS — J02.9 PHARYNGITIS, UNSPECIFIED ETIOLOGY: ICD-10-CM

## 2018-12-31 DIAGNOSIS — J40 BRONCHITIS: Primary | ICD-10-CM

## 2018-12-31 LAB — S PYO AG THROAT QL: NEGATIVE

## 2018-12-31 PROCEDURE — 99214 OFFICE O/P EST MOD 30 MIN: CPT

## 2018-12-31 PROCEDURE — 71046 X-RAY EXAM CHEST 2 VIEWS: CPT | Performed by: NURSE PRACTITIONER

## 2018-12-31 PROCEDURE — 94640 AIRWAY INHALATION TREATMENT: CPT

## 2018-12-31 PROCEDURE — 87430 STREP A AG IA: CPT

## 2018-12-31 RX ORDER — PREDNISONE 20 MG/1
40 TABLET ORAL DAILY
Qty: 10 TABLET | Refills: 0 | Status: SHIPPED | OUTPATIENT
Start: 2018-12-31 | End: 2019-01-05

## 2018-12-31 RX ORDER — IPRATROPIUM BROMIDE AND ALBUTEROL SULFATE 2.5; .5 MG/3ML; MG/3ML
3 SOLUTION RESPIRATORY (INHALATION) ONCE
Status: COMPLETED | OUTPATIENT
Start: 2018-12-31 | End: 2018-12-31

## 2018-12-31 RX ORDER — CEFDINIR 300 MG/1
300 CAPSULE ORAL 2 TIMES DAILY
Qty: 20 CAPSULE | Refills: 0 | Status: SHIPPED | OUTPATIENT
Start: 2018-12-31 | End: 2019-01-10

## 2018-12-31 RX ORDER — ALBUTEROL SULFATE 90 UG/1
2 AEROSOL, METERED RESPIRATORY (INHALATION) EVERY 4 HOURS PRN
Qty: 1 INHALER | Refills: 0 | Status: SHIPPED | OUTPATIENT
Start: 2018-12-31 | End: 2019-01-30

## 2018-12-31 NOTE — TELEPHONE ENCOUNTER
pt calling again wanting to know your recommendations. See Nemo Paez RN message below  She also stated that for the past three days she has been taking double the recommend dose of tylenol.  She stated that she has been taking 2 capsules of tylenol shyam

## 2018-12-31 NOTE — ED PROVIDER NOTES
Patient presents with:  Sore Throat      HPI:     Fidelina Dial is a 26year old female who presents for evaluation and management of a chief complaint of productive cough with green and yellow sputum that she has had for approximately 1 week.   She wa Smoking status: Never Smoker      Smokeless tobacco: Never Used    Substance and Sexual Activity      Alcohol use:  Yes        Alcohol/week: 0.0 oz        Comment: rarely      Drug use: No      Sexual activity: Not on file    Other Topics      Concerns: DENIES FEVERS. PATIENT RECENTLY DX WITH               PNEUMONIA AND IS ON DAY 5 OF A Z-PACK.                           Order Specific Question: What is the Relevant Clinical Indication / Reason for Exam?          Answer: Sore Throat      POCT Rapid Strep O evaluation. Diagnosis:    ICD-10-CM    1. Bronchitis J40    2. Pharyngitis, unspecified etiology J02.9        All results reviewed and discussed with patient. See AVS for detailed discharge instructions for your condition today.     Follow Up with:  Abr

## 2018-12-31 NOTE — TELEPHONE ENCOUNTER
Called pt - spoke to her -- has hada bad sore throat-- wants to cry   She states that she has a very bad sore throat which was not present when she went to see the doctor for her acute visit  Advised patient to go to.[facility]/ etc for evaluation --she state

## 2018-12-31 NOTE — ED INITIAL ASSESSMENT (HPI)
REPORTS SORE THROAT X 2 DAYS. DENIES FEVERS. PATIENT RECENTLY DX WITH PNEUMONIA AND IS ON DAY 5 OF A Z-PACK.

## 2018-12-31 NOTE — TELEPHONE ENCOUNTER
Dr Aury Kebede, please advise. OV 12/27/18, taking the z-pack and on the last day of it. Still has \"bad sore throat\" that she said is keeping her from work and she wants to return to work. Also coughing, mainly dry at this point, rare phlegm.  Some nasal s

## 2019-01-02 DIAGNOSIS — E78.2 MIXED HYPERLIPIDEMIA: Primary | ICD-10-CM

## 2019-01-05 RX ORDER — FENOFIBRATE 145 MG/1
145 TABLET, COATED ORAL DAILY
Qty: 30 TABLET | Refills: 0 | Status: SHIPPED | OUTPATIENT
Start: 2019-01-05 | End: 2019-10-15

## 2019-01-05 NOTE — PROGRESS NOTES
Farhat Mcnamara is a 26year old female who presents for upper respiratory symptoms for  3 days. Patient reports sore throat, fever with Tmax to 100.5, cough with yellow colored sputum, cough is keeping pt up at night, prior history of bronchitis. GENERAL: well developed, well nourished,in no apparent distress  SKIN: no rashes,no suspicious lesions  EYES:PERRLA, EOMI, normal optic disk,conjunctiva are clear  HEENT: atraumatic, normocephalic,ears and throat are clear  NECK: supple,no adenopathy,n

## 2019-01-24 RX ORDER — LISINOPRIL AND HYDROCHLOROTHIAZIDE 25; 20 MG/1; MG/1
TABLET ORAL
Qty: 90 TABLET | Refills: 0 | Status: SHIPPED | OUTPATIENT
Start: 2019-01-24 | End: 2019-07-05

## 2019-02-10 ENCOUNTER — HOSPITAL ENCOUNTER (OUTPATIENT)
Age: 34
Discharge: HOME | End: 2019-02-10
Attending: EMERGENCY MEDICINE
Payer: COMMERCIAL

## 2019-02-10 VITALS
RESPIRATION RATE: 16 BRPM | WEIGHT: 275 LBS | TEMPERATURE: 98 F | BODY MASS INDEX: 42 KG/M2 | HEART RATE: 84 BPM | OXYGEN SATURATION: 98 % | DIASTOLIC BLOOD PRESSURE: 88 MMHG | SYSTOLIC BLOOD PRESSURE: 130 MMHG

## 2019-02-10 DIAGNOSIS — J06.9 VIRAL URI: Primary | ICD-10-CM

## 2019-02-10 LAB — S PYO AG THROAT QL: NEGATIVE

## 2019-02-10 PROCEDURE — 87430 STREP A AG IA: CPT

## 2019-02-10 PROCEDURE — 99212 OFFICE O/P EST SF 10 MIN: CPT

## 2019-02-10 NOTE — ED PROVIDER NOTES
Patient Seen in: [facility] [address] [place] [address]    History   Patient presents with:  Fever (infectious)    Stated Complaint: cold symptoms    HPI    Patient is a 17-year-old female with past history of hypertension, status post tonsillectom Well-developed well-nourished in no acute distress  Head: Normocephalic, no swelling or tenderness  Eyes: Nonicteric sclera, no conjunctival injection  ENT: TMs are clear and flat bilaterally. There is mild posterior pharyngeal erythema.   Postsurgical daniel

## 2019-02-10 NOTE — ED NOTES
Motrin or tylenol for fever aches and pains fluids rest wash hands good oral care meds as directed call make appointment with your pcp in office for 3 days.  Go to the ed for shortness of  Breath chest pain, fever new or worse concenrs

## 2019-03-25 DIAGNOSIS — K21.9 GASTROESOPHAGEAL REFLUX DISEASE, ESOPHAGITIS PRESENCE NOT SPECIFIED: ICD-10-CM

## 2019-03-25 RX ORDER — OMEPRAZOLE 20 MG/1
CAPSULE, DELAYED RELEASE ORAL
Qty: 30 CAPSULE | Refills: 2 | Status: SHIPPED | OUTPATIENT
Start: 2019-03-25 | End: 2019-06-18

## 2019-06-18 DIAGNOSIS — K21.9 GASTROESOPHAGEAL REFLUX DISEASE, ESOPHAGITIS PRESENCE NOT SPECIFIED: ICD-10-CM

## 2019-06-18 RX ORDER — OMEPRAZOLE 20 MG/1
CAPSULE, DELAYED RELEASE ORAL
Qty: 90 CAPSULE | Refills: 1 | Status: SHIPPED | OUTPATIENT
Start: 2019-06-18 | End: 2019-12-30

## 2019-07-05 RX ORDER — LISINOPRIL AND HYDROCHLOROTHIAZIDE 25; 20 MG/1; MG/1
TABLET ORAL
Qty: 90 TABLET | Refills: 0 | Status: SHIPPED | OUTPATIENT
Start: 2019-07-05 | End: 2019-10-27

## 2019-10-15 ENCOUNTER — APPOINTMENT (OUTPATIENT)
Dept: LAB | Facility: HOSPITAL | Age: 34
End: 2019-10-15
Attending: OBSTETRICS & GYNECOLOGY
Payer: COMMERCIAL

## 2019-10-15 ENCOUNTER — OFFICE VISIT (OUTPATIENT)
Dept: OBGYN CLINIC | Facility: CLINIC | Age: 34
End: 2019-10-15
Payer: COMMERCIAL

## 2019-10-15 VITALS
HEART RATE: 80 BPM | WEIGHT: 277.19 LBS | DIASTOLIC BLOOD PRESSURE: 87 MMHG | SYSTOLIC BLOOD PRESSURE: 133 MMHG | BODY MASS INDEX: 42 KG/M2

## 2019-10-15 DIAGNOSIS — R30.0 DYSURIA: Primary | ICD-10-CM

## 2019-10-15 DIAGNOSIS — R30.0 DYSURIA: ICD-10-CM

## 2019-10-15 PROCEDURE — 81001 URINALYSIS AUTO W/SCOPE: CPT

## 2019-10-15 PROCEDURE — 99213 OFFICE O/P EST LOW 20 MIN: CPT | Performed by: OBSTETRICS & GYNECOLOGY

## 2019-10-15 NOTE — PROGRESS NOTES
Ngoc Santamaria is a 37year old female  No LMP recorded. Patient has had a hysterectomy. Patient presents with:  Gyn Problem: new patient to [facility], vaginal pain, poss. UTI    CAP pt. History of hysterectomy for endometrial cancer.     Having dysu tenderness  Vagina:  Normal appearance without lesions, no abnormal discharge. No discharge  Uterus surgically removed    Assessment & Plan:  1. Dysuria  - URINALYSIS WITH CULTURE REFLEX; Future  Pt will call later today for results.         Requested Pres

## 2019-10-16 ENCOUNTER — TELEPHONE (OUTPATIENT)
Dept: OBGYN CLINIC | Facility: CLINIC | Age: 34
End: 2019-10-16

## 2019-10-16 RX ORDER — SULFAMETHOXAZOLE AND TRIMETHOPRIM 800; 160 MG/1; MG/1
1 TABLET ORAL 2 TIMES DAILY
Qty: 6 TABLET | Refills: 0 | Status: SHIPPED | OUTPATIENT
Start: 2019-10-16 | End: 2019-10-19

## 2019-10-27 DIAGNOSIS — E78.2 MIXED HYPERLIPIDEMIA: ICD-10-CM

## 2019-10-27 DIAGNOSIS — R73.9 ELEVATED BLOOD SUGAR: Primary | ICD-10-CM

## 2019-10-30 RX ORDER — LISINOPRIL AND HYDROCHLOROTHIAZIDE 25; 20 MG/1; MG/1
TABLET ORAL
Qty: 90 TABLET | Refills: 0 | Status: SHIPPED | OUTPATIENT
Start: 2019-10-30 | End: 2020-02-14

## 2019-12-02 ENCOUNTER — MED REC SCAN ONLY (OUTPATIENT)
Dept: OBGYN CLINIC | Facility: CLINIC | Age: 34
End: 2019-12-02

## 2019-12-21 ENCOUNTER — LAB ENCOUNTER (OUTPATIENT)
Dept: LAB | Age: 34
End: 2019-12-21
Attending: INTERNAL MEDICINE
Payer: COMMERCIAL

## 2019-12-21 DIAGNOSIS — E78.2 MIXED HYPERLIPIDEMIA: ICD-10-CM

## 2019-12-21 PROCEDURE — 80061 LIPID PANEL: CPT

## 2019-12-21 PROCEDURE — 84443 ASSAY THYROID STIM HORMONE: CPT

## 2019-12-21 PROCEDURE — 80053 COMPREHEN METABOLIC PANEL: CPT

## 2019-12-21 PROCEDURE — 83721 ASSAY OF BLOOD LIPOPROTEIN: CPT

## 2019-12-21 PROCEDURE — 36415 COLL VENOUS BLD VENIPUNCTURE: CPT

## 2019-12-30 ENCOUNTER — OFFICE VISIT (OUTPATIENT)
Dept: INTERNAL MEDICINE CLINIC | Facility: CLINIC | Age: 34
End: 2019-12-30
Payer: COMMERCIAL

## 2019-12-30 ENCOUNTER — APPOINTMENT (OUTPATIENT)
Dept: LAB | Age: 34
End: 2019-12-30
Attending: INTERNAL MEDICINE
Payer: COMMERCIAL

## 2019-12-30 VITALS
DIASTOLIC BLOOD PRESSURE: 77 MMHG | SYSTOLIC BLOOD PRESSURE: 130 MMHG | HEIGHT: 68 IN | OXYGEN SATURATION: 96 % | BODY MASS INDEX: 41.98 KG/M2 | TEMPERATURE: 98 F | RESPIRATION RATE: 20 BRPM | WEIGHT: 277 LBS | HEART RATE: 76 BPM

## 2019-12-30 DIAGNOSIS — R73.9 ELEVATED BLOOD SUGAR: ICD-10-CM

## 2019-12-30 DIAGNOSIS — K21.9 GASTROESOPHAGEAL REFLUX DISEASE, ESOPHAGITIS PRESENCE NOT SPECIFIED: ICD-10-CM

## 2019-12-30 DIAGNOSIS — E66.9 OBESITY (BMI 30-39.9): ICD-10-CM

## 2019-12-30 DIAGNOSIS — E66.9 OBESITY (BMI 30-39.9): Primary | ICD-10-CM

## 2019-12-30 LAB
EST. AVERAGE GLUCOSE BLD GHB EST-MCNC: 134 MG/DL (ref 68–126)
GLUCOSE BLD-MCNC: 130 MG/DL (ref 70–99)
HBA1C MFR BLD HPLC: 6.3 % (ref ?–5.7)

## 2019-12-30 PROCEDURE — 36415 COLL VENOUS BLD VENIPUNCTURE: CPT

## 2019-12-30 PROCEDURE — 83036 HEMOGLOBIN GLYCOSYLATED A1C: CPT

## 2019-12-30 PROCEDURE — 99214 OFFICE O/P EST MOD 30 MIN: CPT | Performed by: INTERNAL MEDICINE

## 2019-12-30 PROCEDURE — 82947 ASSAY GLUCOSE BLOOD QUANT: CPT

## 2019-12-30 RX ORDER — FENOFIBRATE 145 MG/1
145 TABLET, COATED ORAL DAILY
Qty: 90 TABLET | Refills: 0 | Status: SHIPPED | OUTPATIENT
Start: 2019-12-30 | End: 2020-04-01

## 2019-12-30 RX ORDER — OMEPRAZOLE 20 MG/1
CAPSULE, DELAYED RELEASE ORAL
Qty: 90 CAPSULE | Refills: 1 | Status: SHIPPED | OUTPATIENT
Start: 2019-12-30 | End: 2020-06-29

## 2019-12-30 NOTE — PROGRESS NOTES
HPI:    Patient ID: Jason Floyd is a 37year old female.   Patient presents with:  Test Results: Pt is f/u with recent labs  Medication Request: Pend for refill  Patient in office today for elevated sugars and cholesterol especially triglycerides pat nervous/anxious.              Current Outpatient Medications   Medication Sig Dispense Refill   • omeprazole 20 MG Oral Capsule Delayed Release TAKE ONE CAPSULE BY MOUTH IN THE MORNING BEFORE BREAKFAST 90 capsule 1   • Fenofibrate 145 MG Oral Tab Take 1 tab Lymphadenopathy:     She has no cervical adenopathy. Neurological: She is alert and oriented to person, place, and time. Skin: Skin is warm and dry. Psychiatric: She has a normal mood and affect.  Her behavior is normal.   Nursing note and vitals vegetables   · Keep active /walking ,exercise as  tolerated  · Reach ideal weight   · Continue present management   Discussed with patient decreasing low sugar and carbohydrate diet exercising and also losing weight  Patient to also start with fenofibric

## 2020-01-07 ENCOUNTER — TELEPHONE (OUTPATIENT)
Dept: INTERNAL MEDICINE CLINIC | Facility: CLINIC | Age: 35
End: 2020-01-07

## 2020-01-07 NOTE — TELEPHONE ENCOUNTER
Patient states that she has been out of work Friday-Sunday (1/3-1/5) and needs a return to work note for her employer. She is requesting this without being seen. Requesting the note to be released to her MyChart. Please advise.

## 2020-01-08 ENCOUNTER — OFFICE VISIT (OUTPATIENT)
Dept: INTERNAL MEDICINE CLINIC | Facility: CLINIC | Age: 35
End: 2020-01-08
Payer: COMMERCIAL

## 2020-01-08 VITALS
DIASTOLIC BLOOD PRESSURE: 92 MMHG | TEMPERATURE: 98 F | BODY MASS INDEX: 41.68 KG/M2 | HEIGHT: 68 IN | HEART RATE: 85 BPM | WEIGHT: 275 LBS | RESPIRATION RATE: 17 BRPM | SYSTOLIC BLOOD PRESSURE: 136 MMHG

## 2020-01-08 DIAGNOSIS — R73.03 BORDERLINE DIABETES: ICD-10-CM

## 2020-01-08 DIAGNOSIS — J06.9 VIRAL UPPER RESPIRATORY TRACT INFECTION: Primary | ICD-10-CM

## 2020-01-08 PROCEDURE — 99213 OFFICE O/P EST LOW 20 MIN: CPT | Performed by: INTERNAL MEDICINE

## 2020-01-08 NOTE — PROGRESS NOTES
Marialuisa Padilla is a 37year old female.   Patient presents with:  Headache  Swelling: on jaw       HPI:   Pt comes comes for f/u  C/c sinus congestion   C/o pt was seen 12/30/19 --but the next day started getting sore throat and sinus congestion then an Obesity, unspecified    • Unspecified essential hypertension       Past Surgical History:   Procedure Laterality Date   • Hysterectomy  2013   • Tonsillectomy        Social History:  Social History    Tobacco Use      Smoking status: Never Smoker      Smok jump start       The patient indicates understanding of these issues and agrees to the plan. No follow-ups on file.

## 2020-01-08 NOTE — PATIENT INSTRUCTIONS
Prediabetes  You have been diagnosed with prediabetes. This means that the level of sugar (glucose) in your blood is too high. If you have prediabetes, you are at risk for developing type 2 diabetes.  Type 2 diabetes is diagnosed when the level of glucose · Fasting glucose test. Blood is taken and tested after you have fasted (not eaten) for at least 8 hours. A normal test result is 99 milligrams per deciliter (mg/dL) or lower. Prediabetes is 100 mg/dL to 125 mg/dL. Diabetes is 126 mg/dL or higher.   · Gluco · Lose weight for no reason  · Feel numbness or tingling in your fingers or toes  · Have cuts or bruises that don’t seem to heal  · Have blurry vision  Date Last Reviewed: 5/1/2016  © 8500-3320 [employer].  [employer] [address] [place] [address]

## 2020-01-22 ENCOUNTER — TELEPHONE (OUTPATIENT)
Dept: OTHER | Age: 35
End: 2020-01-22

## 2020-01-22 ENCOUNTER — OFFICE VISIT (OUTPATIENT)
Dept: INTERNAL MEDICINE CLINIC | Facility: CLINIC | Age: 35
End: 2020-01-22
Payer: COMMERCIAL

## 2020-01-22 VITALS
RESPIRATION RATE: 18 BRPM | HEIGHT: 68 IN | SYSTOLIC BLOOD PRESSURE: 136 MMHG | DIASTOLIC BLOOD PRESSURE: 89 MMHG | OXYGEN SATURATION: 97 % | TEMPERATURE: 98 F | HEART RATE: 80 BPM | WEIGHT: 274 LBS | BODY MASS INDEX: 41.52 KG/M2

## 2020-01-22 DIAGNOSIS — J02.0 STREP THROAT: Primary | ICD-10-CM

## 2020-01-22 PROCEDURE — 99213 OFFICE O/P EST LOW 20 MIN: CPT | Performed by: INTERNAL MEDICINE

## 2020-01-22 RX ORDER — AMOXICILLIN AND CLAVULANATE POTASSIUM 875; 125 MG/1; MG/1
1 TABLET, FILM COATED ORAL 2 TIMES DAILY
Qty: 14 TABLET | Refills: 0 | Status: SHIPPED | OUTPATIENT
Start: 2020-01-22 | End: 2020-01-29

## 2020-01-22 NOTE — PROGRESS NOTES
Ron Villalba is a 37year old female.   Patient presents with:  Sore Throat: Patient present for a sore throat, congestion and right ear pain      HPI:   Pt comes as an urgent visit  C/C sore throat  C/o sore throat x one day but worse after that x 5 problems, blurry vision or double vision  HEENT: No decreased hearing + right ear pain + nasal congestion + sore throat  SKIN: denies any unusual skin lesions or rashes  RESPIRATORY: denies shortness of breath, cough, wheezing  CARDIOVASCULAR: denies chest

## 2020-01-22 NOTE — TELEPHONE ENCOUNTER
Per pharmacy pt was prescribed augment and has a penicillin Allergy. Please advise. Called pt to confirm allergy and reaction. LMTCB.

## 2020-01-23 NOTE — TELEPHONE ENCOUNTER
Spoke with patient and relayed Dr. Dillan Gibson message below--patient confirms she does NOT have a PCN allergy. She states [place] called her for same--she told pharmacy she is not allergic to PCN and they did give her Augmentin Rx.     She did take on tablet wi

## 2020-02-14 RX ORDER — LISINOPRIL AND HYDROCHLOROTHIAZIDE 25; 20 MG/1; MG/1
TABLET ORAL
Qty: 90 TABLET | Refills: 1 | Status: SHIPPED | OUTPATIENT
Start: 2020-02-14 | End: 2021-04-09

## 2020-04-01 RX ORDER — FENOFIBRATE 145 MG/1
TABLET, COATED ORAL
Qty: 90 TABLET | Refills: 0 | Status: SHIPPED | OUTPATIENT
Start: 2020-04-01 | End: 2020-06-03

## 2020-04-08 ENCOUNTER — TELEPHONE (OUTPATIENT)
Dept: OTHER | Age: 35
End: 2020-04-08

## 2020-04-08 NOTE — TELEPHONE ENCOUNTER
Discussed with patient that provider may decide to do a \"telephone visit\" or a video visit if MyChart active. Patient advised that there may be a co-pay involved in this type of visit.      Patient agreed to proceed, they understand the provider may be

## 2020-04-08 NOTE — TELEPHONE ENCOUNTER
Please help set up a telephone visit for tomorrow  Noted that she still has a cough--should hold off on going back to work

## 2020-04-09 ENCOUNTER — VIRTUAL PHONE E/M (OUTPATIENT)
Dept: INTERNAL MEDICINE CLINIC | Facility: CLINIC | Age: 35
End: 2020-04-09
Payer: COMMERCIAL

## 2020-04-09 DIAGNOSIS — J30.9 ALLERGIC RHINITIS, UNSPECIFIED SEASONALITY, UNSPECIFIED TRIGGER: ICD-10-CM

## 2020-04-09 DIAGNOSIS — R05.9 COUGH: ICD-10-CM

## 2020-04-09 DIAGNOSIS — L50.9 HIVES: Primary | ICD-10-CM

## 2020-04-09 PROCEDURE — 99212 OFFICE O/P EST SF 10 MIN: CPT | Performed by: INTERNAL MEDICINE

## 2020-04-09 NOTE — PROGRESS NOTES
Telemedicine Visit for Respiratory Illness - Potential COVID-19 Infection    Virtual/Telephone Check-In    Darshana Larkin verbally consents to a Virtual/Telephone Check-In service on 04/09/20.  Patient understands and accepts financial responsibility fo disease: Yes []     No [x]      • Travel: Yes []     No [x]      • Sick contacts: Yes []     No [x]       • Contact with COVID19:     PUI  []     Confirmed []     No contact with confirmed COVID19 [x]       • Occupation or large gatherings:      Patient Ac Paulo Jones understands phone evaluation is not a substitute for face-to-face examination or emergency care. Patient advised to go to ER or call 911 for worsening symptoms or acute distress.    Lyndsay Coffman MD

## 2020-04-17 ENCOUNTER — TELEPHONE (OUTPATIENT)
Dept: INTERNAL MEDICINE CLINIC | Facility: CLINIC | Age: 35
End: 2020-04-17

## 2020-04-17 NOTE — TELEPHONE ENCOUNTER
Called and spoke tp pt   She is feeling better --no cough   Completely without sympt   speaking without any distress ,no coughing   We will give letter to return back to work on the 23rd--letter sent to my chart and patient is aware

## 2020-04-28 ENCOUNTER — TELEPHONE (OUTPATIENT)
Dept: INTERNAL MEDICINE CLINIC | Facility: CLINIC | Age: 35
End: 2020-04-28

## 2020-05-01 ENCOUNTER — TELEPHONE (OUTPATIENT)
Dept: INTERNAL MEDICINE CLINIC | Facility: CLINIC | Age: 35
End: 2020-05-01

## 2020-05-13 NOTE — TELEPHONE ENCOUNTER
Dr. Ray Heart - off work from 4/3/2020 - 4/23/20    Please sign off on form:  -Highlight the patient and hit \"Chart\" button. -In Chart Review, w/in the Encounter tab - click 1 time on the Telephone call encounter for 4/28/20.  Scroll down the telep

## 2020-05-19 NOTE — TELEPHONE ENCOUNTER
Faxed Disab form to [address] - [phone]. Sent [employer] message to pt  and pay. Please print for pt.

## 2020-06-03 RX ORDER — FENOFIBRATE 145 MG/1
TABLET, COATED ORAL
Qty: 90 TABLET | Refills: 0 | Status: SHIPPED | OUTPATIENT
Start: 2020-06-03 | End: 2021-04-09

## 2020-06-18 ENCOUNTER — HOSPITAL ENCOUNTER (OUTPATIENT)
Dept: GENERAL RADIOLOGY | Age: 35
Discharge: HOME | End: 2020-06-18
Attending: INTERNAL MEDICINE
Payer: COMMERCIAL

## 2020-06-18 ENCOUNTER — OFFICE VISIT (OUTPATIENT)
Dept: INTERNAL MEDICINE CLINIC | Facility: CLINIC | Age: 35
End: 2020-06-18
Payer: COMMERCIAL

## 2020-06-18 ENCOUNTER — NURSE TRIAGE (OUTPATIENT)
Dept: INTERNAL MEDICINE CLINIC | Facility: CLINIC | Age: 35
End: 2020-06-18

## 2020-06-18 VITALS
WEIGHT: 268 LBS | BODY MASS INDEX: 40.62 KG/M2 | TEMPERATURE: 97 F | HEART RATE: 86 BPM | HEIGHT: 68 IN | SYSTOLIC BLOOD PRESSURE: 135 MMHG | DIASTOLIC BLOOD PRESSURE: 88 MMHG | RESPIRATION RATE: 18 BRPM

## 2020-06-18 DIAGNOSIS — M25.572 ACUTE LEFT ANKLE PAIN: Primary | ICD-10-CM

## 2020-06-18 DIAGNOSIS — M25.471 RIGHT ANKLE SWELLING: ICD-10-CM

## 2020-06-18 DIAGNOSIS — M25.572 ACUTE LEFT ANKLE PAIN: ICD-10-CM

## 2020-06-18 DIAGNOSIS — S93.402A SPRAIN OF LEFT ANKLE, UNSPECIFIED LIGAMENT, INITIAL ENCOUNTER: ICD-10-CM

## 2020-06-18 PROCEDURE — 99213 OFFICE O/P EST LOW 20 MIN: CPT | Performed by: INTERNAL MEDICINE

## 2020-06-18 PROCEDURE — 73610 X-RAY EXAM OF ANKLE: CPT | Performed by: INTERNAL MEDICINE

## 2020-06-18 NOTE — PROGRESS NOTES
Janice Giordano is a 37year old female. Patient presents with:   Ankle Pain: left       HPI:   Patient comes as an acute visit  C/C ankle pain  C/O ankle pain of the left ankle–3 days ago was walking to work and steeped on a flag stone and it rocked an unusual skin lesions or rashes  RESPIRATORY: denies shortness of breath, cough, wheezing  CARDIOVASCULAR: denies chest pain on exertion, palpitations, swelling in feet  MUS: No back pain, +joint pain- ankle ,no muscle pain  NEURO: denies headaches , anxiet

## 2020-06-18 NOTE — TELEPHONE ENCOUNTER
Action Requested: Summary for Provider     []  Critical Lab, Recommendations Needed  [] Need Additional Advice  [x]   FYI    []   Need Orders  [] Need Medications Sent to Pharmacy  []  Other     SUMMARY: Patient stated that thinks she sprained her left ank

## 2020-06-29 DIAGNOSIS — K21.9 GASTROESOPHAGEAL REFLUX DISEASE, ESOPHAGITIS PRESENCE NOT SPECIFIED: ICD-10-CM

## 2020-06-29 RX ORDER — OMEPRAZOLE 20 MG/1
CAPSULE, DELAYED RELEASE ORAL
Qty: 90 CAPSULE | Refills: 0 | Status: SHIPPED | OUTPATIENT
Start: 2020-06-29

## 2020-08-12 ENCOUNTER — TELEPHONE (OUTPATIENT)
Dept: INTERNAL MEDICINE CLINIC | Facility: CLINIC | Age: 35
End: 2020-08-12

## 2020-08-12 DIAGNOSIS — Z91.89 INCREASED RISK OF BREAST CANCER: Primary | ICD-10-CM

## 2020-08-12 DIAGNOSIS — Z12.31 SCREENING MAMMOGRAM, ENCOUNTER FOR: ICD-10-CM

## 2020-08-12 NOTE — TELEPHONE ENCOUNTER
Dr. Rhys Marroquin, patient is requesting a mammogram order. Last mammogram was completed 04/20/2017. Please advise on order. CONCLUSION:       BI-RADS CATEGORY:      DIAGNOSTIC CATEGORY 1--NEGATIVE ASSESSMENT.        RECOMMENDATIONS:   CLINICAL EVALUATION

## 2020-08-13 NOTE — TELEPHONE ENCOUNTER
Unable to get a hold of mammogram department. Patient informed of message of order approval. Will call to set up an appt and call back if there are any issues with the order.

## 2020-08-13 NOTE — TELEPHONE ENCOUNTER
Review chart and noted that patient is at increased risk for breast cancer  Original order was placed by Dr. Richards Cea  Please call the mammogram department to see that this will be covered under the correct diagnosis has been placed  Please ask patient to make

## 2020-08-24 ENCOUNTER — OFFICE VISIT (OUTPATIENT)
Dept: SURGERY | Facility: CLINIC | Age: 35
End: 2020-08-24
Payer: COMMERCIAL

## 2020-08-24 VITALS
DIASTOLIC BLOOD PRESSURE: 94 MMHG | OXYGEN SATURATION: 96 % | WEIGHT: 279.38 LBS | BODY MASS INDEX: 41.38 KG/M2 | HEART RATE: 90 BPM | HEIGHT: 68.9 IN | SYSTOLIC BLOOD PRESSURE: 156 MMHG

## 2020-08-24 DIAGNOSIS — F43.9 STRESS: ICD-10-CM

## 2020-08-24 DIAGNOSIS — E88.81 INSULIN RESISTANCE: ICD-10-CM

## 2020-08-24 DIAGNOSIS — K21.9 GASTROESOPHAGEAL REFLUX DISEASE, ESOPHAGITIS PRESENCE NOT SPECIFIED: ICD-10-CM

## 2020-08-24 DIAGNOSIS — R73.9 HYPERGLYCEMIA: ICD-10-CM

## 2020-08-24 DIAGNOSIS — I10 ESSENTIAL HYPERTENSION WITH GOAL BLOOD PRESSURE LESS THAN 140/90: Primary | ICD-10-CM

## 2020-08-24 DIAGNOSIS — E66.01 MORBID OBESITY WITH BMI OF 40.0-44.9, ADULT (HCC): ICD-10-CM

## 2020-08-24 DIAGNOSIS — R73.03 PRE-DIABETES: ICD-10-CM

## 2020-08-24 PROCEDURE — 3077F SYST BP >= 140 MM HG: CPT | Performed by: INTERNAL MEDICINE

## 2020-08-24 PROCEDURE — 3080F DIAST BP >= 90 MM HG: CPT | Performed by: INTERNAL MEDICINE

## 2020-08-24 PROCEDURE — 3008F BODY MASS INDEX DOCD: CPT | Performed by: INTERNAL MEDICINE

## 2020-08-24 PROCEDURE — 99204 OFFICE O/P NEW MOD 45 MIN: CPT | Performed by: INTERNAL MEDICINE

## 2020-08-24 NOTE — PROGRESS NOTES
[facility] Consultation Note       Patient:  Pravin Guillory  :      1985  MRN:      [id number]    Referring Provider: Dr. Frankey Bowl       Chief Complaint:  Patient presents with:  Consult  Weight Management      SUBJECTIVE osteoarthritis    Social History:  Social History    Socioeconomic History      Marital status:       Spouse name: Not on file      Number of children: Not on file      Years of education: Not on file      Highest education level: Not on file    Occ Narrative      Not on file    Surgical History:    Past Surgical History:   Procedure Laterality Date   • HYSTERECTOMY  2013   • TONSILLECTOMY         Family History:    Family History   Problem Relation Age of Onset   • Diabetes Mother    • Hypertension M Exam:  General appearance: alert, appears stated age, cooperative and morbidly obese  Head: Normocephalic, without obvious abnormality, atraumatic  Eyes: conjunctivae/corneas clear. PERRL, EOM's intact. Fundi benign.   Neck: no adenopathy, no carotid bruit, metformin    Goals for next month:  1. Keep a food log. 2. Drink 48-64 ounces of non-caloric beverages per day. No fruit juices or regular soda. 3. Increase activity-upper body exercises, walk 10 minutes per day.   4. Increase fruit and vegetable servings

## 2020-09-14 ENCOUNTER — HOSPITAL ENCOUNTER (OUTPATIENT)
Dept: MAMMOGRAPHY | Age: 35
Discharge: HOME | End: 2020-09-14
Attending: INTERNAL MEDICINE
Payer: COMMERCIAL

## 2020-09-14 ENCOUNTER — HOSPITAL ENCOUNTER (OUTPATIENT)
Dept: MAMMOGRAPHY | Age: 35
End: 2020-09-14
Attending: INTERNAL MEDICINE
Payer: COMMERCIAL

## 2020-09-14 DIAGNOSIS — Z91.89 INCREASED RISK OF BREAST CANCER: ICD-10-CM

## 2020-09-14 DIAGNOSIS — Z12.31 SCREENING MAMMOGRAM, ENCOUNTER FOR: ICD-10-CM

## 2020-09-14 PROCEDURE — 77067 SCR MAMMO BI INCL CAD: CPT | Performed by: INTERNAL MEDICINE

## 2020-09-14 PROCEDURE — 77063 BREAST TOMOSYNTHESIS BI: CPT | Performed by: INTERNAL MEDICINE

## 2020-09-15 ENCOUNTER — TELEPHONE (OUTPATIENT)
Dept: SURGERY | Facility: CLINIC | Age: 35
End: 2020-09-15

## 2020-09-15 NOTE — TELEPHONE ENCOUNTER
Camila Bunn went to the jump start program today. But was told that if she is on Diabetic Meds, she should not be in the class.  Please advise

## 2020-09-24 ENCOUNTER — OFFICE VISIT (OUTPATIENT)
Dept: SURGERY | Facility: CLINIC | Age: 35
End: 2020-09-24
Payer: COMMERCIAL

## 2020-09-24 VITALS
DIASTOLIC BLOOD PRESSURE: 88 MMHG | OXYGEN SATURATION: 98 % | SYSTOLIC BLOOD PRESSURE: 128 MMHG | BODY MASS INDEX: 39.25 KG/M2 | HEIGHT: 68.9 IN | HEART RATE: 75 BPM | WEIGHT: 265 LBS

## 2020-09-24 DIAGNOSIS — R73.9 HYPERGLYCEMIA: ICD-10-CM

## 2020-09-24 DIAGNOSIS — R73.03 PRE-DIABETES: ICD-10-CM

## 2020-09-24 DIAGNOSIS — I10 ESSENTIAL HYPERTENSION WITH GOAL BLOOD PRESSURE LESS THAN 140/90: Primary | ICD-10-CM

## 2020-09-24 DIAGNOSIS — E66.01 MORBID OBESITY WITH BMI OF 40.0-44.9, ADULT (HCC): ICD-10-CM

## 2020-09-24 DIAGNOSIS — Z51.81 ENCOUNTER FOR THERAPEUTIC DRUG MONITORING: ICD-10-CM

## 2020-09-24 DIAGNOSIS — F43.9 STRESS: ICD-10-CM

## 2020-09-24 DIAGNOSIS — K21.9 GASTROESOPHAGEAL REFLUX DISEASE, ESOPHAGITIS PRESENCE NOT SPECIFIED: ICD-10-CM

## 2020-09-24 DIAGNOSIS — E88.81 INSULIN RESISTANCE: ICD-10-CM

## 2020-09-24 PROCEDURE — 3008F BODY MASS INDEX DOCD: CPT | Performed by: INTERNAL MEDICINE

## 2020-09-24 PROCEDURE — 99214 OFFICE O/P EST MOD 30 MIN: CPT | Performed by: INTERNAL MEDICINE

## 2020-09-24 PROCEDURE — 3074F SYST BP LT 130 MM HG: CPT | Performed by: INTERNAL MEDICINE

## 2020-09-24 PROCEDURE — 3079F DIAST BP 80-89 MM HG: CPT | Performed by: INTERNAL MEDICINE

## 2020-09-24 RX ORDER — ORAL SEMAGLUTIDE 3 MG/1
1 TABLET ORAL DAILY
Qty: 30 TABLET | Refills: 2 | Status: SHIPPED | OUTPATIENT
Start: 2020-09-24 | End: 2020-10-24

## 2020-09-24 NOTE — PROGRESS NOTES
[address] [facility]  Dept: [phone]       Patient:  Damian Miles  :      1985  MRN:      [id number]    Chief Complaint:  Patient pr History    Socioeconomic History      Marital status:       Spouse name: Not on file      Number of children: Not on file      Years of education: Not on file      Highest education level: Not on file    Occupational History      Not on file    Soci Past Surgical History:   Procedure Laterality Date   • HYSTERECTOMY  2013   • TONSILLECTOMY       Family History:    Family History   Problem Relation Age of Onset   • Diabetes Mother    • Hypertension Mother    • Lipids Mother         Hyperlipidemia   • negative  Endocrine: negative  All other systems were reviewed and are negative    Physical Exam:   General appearance: alert, appears stated age, cooperative and moderately obese  Head: Normocephalic, without obvious abnormality, atraumatic  Eyes: conjunc current medication. No changes initiated on this visit. Pre diabetes: continue watching carbs    Goals for next month:  1. Keep a food log. 2. Drink 48-64 ounces of non-caloric beverages per day. No fruit juices or regular soda.   3. Increase activity-u

## 2020-10-28 ENCOUNTER — OFFICE VISIT (OUTPATIENT)
Dept: INTERNAL MEDICINE CLINIC | Facility: CLINIC | Age: 35
End: 2020-10-28
Payer: COMMERCIAL

## 2020-10-28 VITALS
HEART RATE: 67 BPM | WEIGHT: 262 LBS | TEMPERATURE: 99 F | HEIGHT: 68.9 IN | DIASTOLIC BLOOD PRESSURE: 86 MMHG | BODY MASS INDEX: 38.8 KG/M2 | SYSTOLIC BLOOD PRESSURE: 132 MMHG

## 2020-10-28 DIAGNOSIS — H92.01 RIGHT EAR PAIN: Primary | ICD-10-CM

## 2020-10-28 PROCEDURE — 3008F BODY MASS INDEX DOCD: CPT | Performed by: INTERNAL MEDICINE

## 2020-10-28 PROCEDURE — 3075F SYST BP GE 130 - 139MM HG: CPT | Performed by: INTERNAL MEDICINE

## 2020-10-28 PROCEDURE — 99213 OFFICE O/P EST LOW 20 MIN: CPT | Performed by: INTERNAL MEDICINE

## 2020-10-28 PROCEDURE — 3079F DIAST BP 80-89 MM HG: CPT | Performed by: INTERNAL MEDICINE

## 2020-10-28 RX ORDER — AMOXICILLIN 875 MG/1
875 TABLET, COATED ORAL 2 TIMES DAILY
Qty: 20 TABLET | Refills: 0 | Status: SHIPPED | OUTPATIENT
Start: 2020-10-28 | End: 2020-11-07

## 2020-10-28 NOTE — PROGRESS NOTES
Telemedicine Visit for Respiratory Illness - Potential COVID-19 Infection    Virtual/Telephone Check-In    Gabbimarie Naima verbally consents to a Telephone Check-In service on 10/28/20.  Patient understands and accepts financial responsibility for any de • Occupation or large gatherings:      Patient Active Problem List:     Pain in rib     Essential hypertension with goal blood pressure less than 140/90     Morbid obesity ([facility] [address]     Gastroesophageal reflux disease     Hemorrhoids     Other headache syndr There are limitations of this visit as no physical exam could be performed. Every conscious effort was taken to allow for sufficient and adequate time. This billing was spent on reviewing labs, medications, radiology tests and decision making.   [employer]

## 2020-11-02 ENCOUNTER — NURSE TRIAGE (OUTPATIENT)
Dept: INTERNAL MEDICINE CLINIC | Facility: CLINIC | Age: 35
End: 2020-11-02

## 2020-11-02 ENCOUNTER — TELEMEDICINE (OUTPATIENT)
Dept: INTERNAL MEDICINE CLINIC | Facility: CLINIC | Age: 35
End: 2020-11-02
Payer: COMMERCIAL

## 2020-11-02 DIAGNOSIS — S16.1XXA STRAIN OF NECK MUSCLE, INITIAL ENCOUNTER: Primary | ICD-10-CM

## 2020-11-02 PROCEDURE — 99213 OFFICE O/P EST LOW 20 MIN: CPT | Performed by: PHYSICIAN ASSISTANT

## 2020-11-02 RX ORDER — CYCLOBENZAPRINE HCL 10 MG
10 TABLET ORAL NIGHTLY
Qty: 30 TABLET | Refills: 0 | Status: SHIPPED | OUTPATIENT
Start: 2020-11-02 | End: 2020-12-01 | Stop reason: ALTCHOICE

## 2020-11-02 NOTE — TELEPHONE ENCOUNTER
Action Requested: Summary for Provider     []  Critical Lab, Recommendations Needed  [] Need Additional Advice  []   FYI    []   Need Orders  [] Need Medications Sent to Pharmacy  []  Other     SUMMARY: Patient reporting neck pain in the back of her neck w

## 2020-11-02 NOTE — PROGRESS NOTES
Virtual Telephone Check-In    Priti Joseph verbally consents to a Virtual/Telephone Check-In visit on 11/02/20. Patient has been referred to the [facility] website at [email or web address] to review the yearly Consent to Treat document.     Patient josi

## 2020-12-01 ENCOUNTER — TELEMEDICINE (OUTPATIENT)
Dept: INTERNAL MEDICINE CLINIC | Facility: CLINIC | Age: 35
End: 2020-12-01

## 2020-12-01 DIAGNOSIS — H92.01 RIGHT EAR PAIN: ICD-10-CM

## 2020-12-01 DIAGNOSIS — R12 HEARTBURN: ICD-10-CM

## 2020-12-01 DIAGNOSIS — R60.0 SWELLING OF RIGHT PAROTID GLAND: ICD-10-CM

## 2020-12-01 DIAGNOSIS — J02.9 SORE THROAT: Primary | ICD-10-CM

## 2020-12-01 PROCEDURE — 99213 OFFICE O/P EST LOW 20 MIN: CPT | Performed by: INTERNAL MEDICINE

## 2020-12-01 NOTE — PROGRESS NOTES
Patient ID: Damian Miles is a 38year old female. Patient presents with:  Sore Throat         HISTORY OF PRESENT ILLNESS:   Patient presents for above. This visit is conducted using Telemedicine with live, interactive video and audio.      C/c sore 1    Allergies:No Known Allergies    Social History    Socioeconomic History      Marital status:       Spouse name: Not on file      Number of children: Not on file      Years of education: Not on file      Highest education level: Not on file    O Narrative      Not on file      PHYSICAL EXAM:   Unable to perform vitals or do physical exam as this is a virtual video visit.   Patient appears alert and oriented x 3 , NAD patient speaking in complete sentences without any conversational dyspnea or respi advised of the potential privacy & security concerns related to the telehealth platform. The patient was made aware of where to find [facility] notice of privacy practices, telehealth consent form and other related consent forms and documents.   which are loca

## 2020-12-24 ENCOUNTER — VIRTUAL PHONE E/M (OUTPATIENT)
Dept: SURGERY | Facility: CLINIC | Age: 35
End: 2020-12-24
Payer: COMMERCIAL

## 2020-12-24 VITALS — WEIGHT: 253 LBS | BODY MASS INDEX: 37.47 KG/M2 | HEIGHT: 68.9 IN

## 2020-12-24 DIAGNOSIS — I10 ESSENTIAL HYPERTENSION WITH GOAL BLOOD PRESSURE LESS THAN 140/90: Primary | ICD-10-CM

## 2020-12-24 DIAGNOSIS — E66.9 OBESITY (BMI 30-39.9): ICD-10-CM

## 2020-12-24 DIAGNOSIS — E66.01 MORBID OBESITY WITH BMI OF 40.0-44.9, ADULT (HCC): ICD-10-CM

## 2020-12-24 DIAGNOSIS — R73.03 PRE-DIABETES: ICD-10-CM

## 2020-12-24 DIAGNOSIS — F43.9 STRESS: ICD-10-CM

## 2020-12-24 DIAGNOSIS — Z51.81 ENCOUNTER FOR THERAPEUTIC DRUG MONITORING: ICD-10-CM

## 2020-12-24 PROCEDURE — 99214 OFFICE O/P EST MOD 30 MIN: CPT | Performed by: INTERNAL MEDICINE

## 2020-12-24 PROCEDURE — 3008F BODY MASS INDEX DOCD: CPT | Performed by: INTERNAL MEDICINE

## 2020-12-24 NOTE — PROGRESS NOTES
[address] [place], [place]  [address]  [facility]  Dept: [phone]     Virtual Telephone Check-In    Claudean Smoker verbally consents to a Virtual/Telephone Check-In visit 145 MG Oral Tab TAKE ONE TABLET BY MOUTH ONE TIME DAILY  90 tablet 0   • Lisinopril-hydroCHLOROthiazide 20-25 MG Oral Tab TAKE ONE TABLET BY MOUTH ONE TIME DAILY. 90 tablet 1     Allergies:  Patient has no known allergies.      Social History:  Social Histo Not Asked        Special Diet: Not Asked        Back Care: Not Asked        Exercise: Not Asked        Bike Helmet: Not Asked        Seat Belt: Not Asked        Self-Exams: Not Asked    Social History Narrative      Not on file    Surgical History:    Past Continue cardio work outs    ROS:    Constitutional: negative  Respiratory: negative  Cardiovascular: negative  Gastrointestinal: positive for nausea  Musculoskeletal:negative  Neurological: positive for headaches  Behavioral/Psych: negative  Endocrine: intake    Tolerating Rybelsus, will continue at current dose (3 mg)  Nausea noted  Should eat fruit when nauseated or weak    Tolerating zoloft  Continue 50 mg after lunch    Started Jump Start    Diagnoses and all orders for this visit:    Essential hyper

## 2021-01-08 ENCOUNTER — TELEPHONE (OUTPATIENT)
Dept: INTERNAL MEDICINE CLINIC | Facility: CLINIC | Age: 36
End: 2021-01-08

## 2021-01-08 NOTE — TELEPHONE ENCOUNTER
Dr. David Wong,     Please sign off on form: Disability from 10/23/20-11/12/20    -Highlight the patient and hit \"Chart\" button.   -In Chart Review, w/in the Encounter tab - click 1 time on the Telephone call encounter for 1/8/21 Scroll down the telephone en

## 2021-01-28 ENCOUNTER — TELEMEDICINE (OUTPATIENT)
Dept: INTERNAL MEDICINE CLINIC | Facility: CLINIC | Age: 36
End: 2021-01-28
Payer: COMMERCIAL

## 2021-01-28 DIAGNOSIS — R51.9 NONINTRACTABLE HEADACHE, UNSPECIFIED CHRONICITY PATTERN, UNSPECIFIED HEADACHE TYPE: Primary | ICD-10-CM

## 2021-01-28 PROCEDURE — 99213 OFFICE O/P EST LOW 20 MIN: CPT | Performed by: INTERNAL MEDICINE

## 2021-01-28 NOTE — PROGRESS NOTES
Telemedicine Visit for Respiratory Illness - Potential COVID-19 Infection    Virtual/Telephone Check-In    Rodolfo Avalos verbally consents to a Telephone Check-In service on 01/28/21.  Patient understands and accepts financial responsibility for any de Patient Active Problem List:     Pain in rib     Essential hypertension with goal blood pressure less than 140/90     Morbid obesity (HCC)     Gastroesophageal reflux disease     Hemorrhoids     Other headache syndrome     Leg numbness     Cough     Fe and adequate time. This billing was spent on reviewing labs, medications, radiology tests and decision making. Appropriate medical decision-making and tests are ordered as detailed in the plan of care above.  Patient also advised to follow CDC guidelines

## 2021-04-09 RX ORDER — FENOFIBRATE 145 MG/1
145 TABLET, COATED ORAL DAILY
Qty: 14 TABLET | Refills: 0 | Status: SHIPPED | OUTPATIENT
Start: 2021-04-09 | End: 2021-04-23

## 2021-04-09 RX ORDER — LISINOPRIL AND HYDROCHLOROTHIAZIDE 25; 20 MG/1; MG/1
TABLET ORAL
Qty: 14 TABLET | Refills: 0 | Status: SHIPPED | OUTPATIENT
Start: 2021-04-09 | End: 2021-04-23

## 2021-04-09 NOTE — TELEPHONE ENCOUNTER
Per patient she is totally out of medication and patient don't have medical insurance right now, patient would like to know if doctor can send 2 weeks of her Lisinopril and FENOFIBRATE.to her pharmacy on file.     Current Outpatient Medications   Medication

## 2021-04-23 ENCOUNTER — TELEPHONE (OUTPATIENT)
Dept: INTERNAL MEDICINE CLINIC | Facility: CLINIC | Age: 36
End: 2021-04-23

## 2021-04-23 RX ORDER — OMEPRAZOLE 20 MG/1
20 CAPSULE, DELAYED RELEASE ORAL
Qty: 90 CAPSULE | Refills: 3 | Status: SHIPPED | OUTPATIENT
Start: 2021-04-23 | End: 2022-05-05

## 2021-04-23 RX ORDER — LISINOPRIL AND HYDROCHLOROTHIAZIDE 25; 20 MG/1; MG/1
TABLET ORAL
Qty: 90 TABLET | Refills: 3 | Status: SHIPPED | OUTPATIENT
Start: 2021-04-23 | End: 2022-08-15

## 2021-04-23 RX ORDER — FENOFIBRATE 145 MG/1
145 TABLET, COATED ORAL DAILY
Qty: 90 TABLET | Refills: 3 | Status: SHIPPED | OUTPATIENT
Start: 2021-04-23 | End: 2022-08-15

## 2021-04-23 NOTE — TELEPHONE ENCOUNTER
Patient is requesting a refill, states she is still out of insurance and presumes her employer should be finalizing her insurance within the next two weeks.  Patient requested the refill to be sent to [address] [place] [address] in [facility] (on file)      Fenofibrate 145 MG Oral Tab    Lisinopril-hydroCHLOROthiazide 20-25 MG Oral Tab    OMEPRAZOLE 20 MG Oral Capsule Delayed Release

## 2021-09-24 ENCOUNTER — NURSE TRIAGE (OUTPATIENT)
Dept: INTERNAL MEDICINE CLINIC | Facility: CLINIC | Age: 36
End: 2021-09-24

## 2021-09-24 ENCOUNTER — HOSPITAL ENCOUNTER (OUTPATIENT)
Age: 36
Discharge: HOME | End: 2021-09-24
Payer: COMMERCIAL

## 2021-09-24 VITALS
HEART RATE: 86 BPM | TEMPERATURE: 97 F | RESPIRATION RATE: 18 BRPM | OXYGEN SATURATION: 98 % | DIASTOLIC BLOOD PRESSURE: 79 MMHG | SYSTOLIC BLOOD PRESSURE: 140 MMHG

## 2021-09-24 DIAGNOSIS — N30.01 ACUTE CYSTITIS WITH HEMATURIA: Primary | ICD-10-CM

## 2021-09-24 PROCEDURE — 87086 URINE CULTURE/COLONY COUNT: CPT | Performed by: PHYSICIAN ASSISTANT

## 2021-09-24 PROCEDURE — 99214 OFFICE O/P EST MOD 30 MIN: CPT

## 2021-09-24 PROCEDURE — 99213 OFFICE O/P EST LOW 20 MIN: CPT

## 2021-09-24 PROCEDURE — 87186 SC STD MICRODIL/AGAR DIL: CPT | Performed by: PHYSICIAN ASSISTANT

## 2021-09-24 PROCEDURE — 87077 CULTURE AEROBIC IDENTIFY: CPT | Performed by: PHYSICIAN ASSISTANT

## 2021-09-24 RX ORDER — CEPHALEXIN 500 MG/1
500 CAPSULE ORAL 2 TIMES DAILY
Qty: 20 CAPSULE | Refills: 0 | Status: SHIPPED | OUTPATIENT
Start: 2021-09-24 | End: 2021-10-04

## 2021-09-24 NOTE — ED PROVIDER NOTES
Patient Seen in: [facility]      History   Patient presents with:  Urinary Symptoms    Stated Complaint: bladder problem     Subjective:   HPI    44 yo female with PMH of HTN here for evaluation of dysuria, frequency onset this AM.  Pt states is normal.   Abdominal:      General: Abdomen is flat. There is no distension. Tenderness: There is no abdominal tenderness. Musculoskeletal:         General: Normal range of motion. Cervical back: Normal range of motion.    Skin:     General: S

## 2021-09-24 NOTE — TELEPHONE ENCOUNTER
Action Requested: Summary for Provider     []  Critical Lab, Recommendations Needed  [] Need Additional Advice  []   FYI    []   Need Orders  [] Need Medications Sent to Pharmacy  []  Other     SUMMARY: pt going to IC for eval \"peeing blood\"    Pt states

## 2021-09-24 NOTE — ED INITIAL ASSESSMENT (HPI)
Patient with dysuria and urinary frequency starting this am.  Now with hematuria. Denies lower back, flank pain, states she has had lower abdominal pain for the last 1.5 weeks.

## 2022-03-16 ENCOUNTER — HOSPITAL ENCOUNTER (OUTPATIENT)
Age: 37
Discharge: HOME | End: 2022-03-16
Payer: COMMERCIAL

## 2022-03-16 VITALS
OXYGEN SATURATION: 97 % | RESPIRATION RATE: 28 BRPM | TEMPERATURE: 104 F | HEART RATE: 138 BPM | SYSTOLIC BLOOD PRESSURE: 168 MMHG | DIASTOLIC BLOOD PRESSURE: 78 MMHG

## 2022-03-16 DIAGNOSIS — J10.1 INFLUENZA A: Primary | ICD-10-CM

## 2022-03-16 LAB
POCT INFLUENZA A: POSITIVE
POCT INFLUENZA B: NEGATIVE

## 2022-03-16 PROCEDURE — 99213 OFFICE O/P EST LOW 20 MIN: CPT

## 2022-03-16 PROCEDURE — 87502 INFLUENZA DNA AMP PROBE: CPT | Performed by: NURSE PRACTITIONER

## 2022-03-16 RX ORDER — ONDANSETRON 4 MG/1
4 TABLET, ORALLY DISINTEGRATING ORAL ONCE
Status: COMPLETED | OUTPATIENT
Start: 2022-03-16 | End: 2022-03-16

## 2022-03-16 RX ORDER — IBUPROFEN 600 MG/1
600 TABLET ORAL EVERY 8 HOURS PRN
Qty: 30 TABLET | Refills: 0 | Status: SHIPPED | OUTPATIENT
Start: 2022-03-16 | End: 2022-03-23

## 2022-03-16 RX ORDER — IBUPROFEN 400 MG/1
400 TABLET ORAL ONCE
Status: COMPLETED | OUTPATIENT
Start: 2022-03-16 | End: 2022-03-16

## 2022-03-16 RX ORDER — ONDANSETRON 4 MG/1
4 TABLET, ORALLY DISINTEGRATING ORAL EVERY 4 HOURS PRN
Qty: 10 TABLET | Refills: 0 | Status: SHIPPED | OUTPATIENT
Start: 2022-03-16 | End: 2022-03-23

## 2022-03-16 RX ORDER — ACETAMINOPHEN 500 MG
1000 TABLET ORAL EVERY 4 HOURS PRN
Qty: 100 TABLET | Refills: 0 | Status: SHIPPED | OUTPATIENT
Start: 2022-03-16 | End: 2022-03-23

## 2022-03-16 NOTE — ED INITIAL ASSESSMENT (HPI)
Pt presents with congestion, loss of taste and fever x 2 days . Headache+    Pt has hx of Covid 12/25/22    Oral temp upon arrival 103.6    Last Tylenol and Motrin were at 2p today, per pt.

## 2022-05-04 ENCOUNTER — TELEPHONE (OUTPATIENT)
Dept: INTERNAL MEDICINE CLINIC | Facility: CLINIC | Age: 37
End: 2022-05-04

## 2022-05-04 NOTE — TELEPHONE ENCOUNTER
Verified name and . Patient states that she felt that she may have had a \"blood sugar spike\" last night. She reports that last night, she felt very thirsty and had frequent urination. She denies any symptoms att his time. She is requesting blood tests to follow up on pre-diabetes.     Appointment scheduled:  Future Appointments   Date Time Provider Chance Kitty   2022 10:40 AM Ryan Centeno MD [facility]   2022  8:30 AM Radha Nevarez MD [address]   2022 11:40 AM Ryan Centeno MD [facility]

## 2022-05-05 ENCOUNTER — OFFICE VISIT (OUTPATIENT)
Dept: INTERNAL MEDICINE CLINIC | Facility: CLINIC | Age: 37
End: 2022-05-05
Payer: COMMERCIAL

## 2022-05-05 VITALS
DIASTOLIC BLOOD PRESSURE: 81 MMHG | WEIGHT: 262 LBS | HEIGHT: 68.9 IN | BODY MASS INDEX: 38.8 KG/M2 | SYSTOLIC BLOOD PRESSURE: 134 MMHG | HEART RATE: 91 BPM

## 2022-05-05 DIAGNOSIS — Z01.419 ENCOUNTER FOR ROUTINE GYNECOLOGICAL EXAMINATION WITH PAPANICOLAOU SMEAR OF CERVIX: ICD-10-CM

## 2022-05-05 DIAGNOSIS — F32.A ANXIETY AND DEPRESSION: ICD-10-CM

## 2022-05-05 DIAGNOSIS — R73.03 PRE-DIABETES: ICD-10-CM

## 2022-05-05 DIAGNOSIS — I10 ESSENTIAL HYPERTENSION WITH GOAL BLOOD PRESSURE LESS THAN 140/90: Primary | ICD-10-CM

## 2022-05-05 DIAGNOSIS — F41.9 ANXIETY AND DEPRESSION: ICD-10-CM

## 2022-05-05 DIAGNOSIS — E66.01 MORBID OBESITY WITH BMI OF 40.0-44.9, ADULT (HCC): ICD-10-CM

## 2022-05-05 PROCEDURE — 99214 OFFICE O/P EST MOD 30 MIN: CPT | Performed by: INTERNAL MEDICINE

## 2022-05-05 PROCEDURE — 3008F BODY MASS INDEX DOCD: CPT | Performed by: INTERNAL MEDICINE

## 2022-05-05 PROCEDURE — 3075F SYST BP GE 130 - 139MM HG: CPT | Performed by: INTERNAL MEDICINE

## 2022-05-05 PROCEDURE — 3079F DIAST BP 80-89 MM HG: CPT | Performed by: INTERNAL MEDICINE

## 2022-05-06 ENCOUNTER — LAB ENCOUNTER (OUTPATIENT)
Dept: LAB | Age: 37
End: 2022-05-06
Attending: INTERNAL MEDICINE
Payer: COMMERCIAL

## 2022-05-06 DIAGNOSIS — E55.9 VITAMIN D DEFICIENCY: ICD-10-CM

## 2022-05-06 DIAGNOSIS — F41.9 ANXIETY AND DEPRESSION: ICD-10-CM

## 2022-05-06 DIAGNOSIS — E66.01 MORBID OBESITY WITH BMI OF 40.0-44.9, ADULT (HCC): ICD-10-CM

## 2022-05-06 DIAGNOSIS — R73.03 PRE-DIABETES: ICD-10-CM

## 2022-05-06 DIAGNOSIS — F32.A ANXIETY AND DEPRESSION: ICD-10-CM

## 2022-05-06 DIAGNOSIS — E66.9 OBESITY (BMI 30-39.9): ICD-10-CM

## 2022-05-06 DIAGNOSIS — E88.81 INSULIN RESISTANCE: ICD-10-CM

## 2022-05-06 DIAGNOSIS — Z51.81 ENCOUNTER FOR THERAPEUTIC DRUG MONITORING: ICD-10-CM

## 2022-05-06 DIAGNOSIS — F43.9 STRESS: ICD-10-CM

## 2022-05-06 DIAGNOSIS — I10 ESSENTIAL HYPERTENSION WITH GOAL BLOOD PRESSURE LESS THAN 140/90: ICD-10-CM

## 2022-05-06 LAB
ALBUMIN SERPL-MCNC: 3.8 G/DL (ref 3.4–5)
ALBUMIN/GLOB SERPL: 1 {RATIO} (ref 1–2)
ALP LIVER SERPL-CCNC: 54 U/L
ALT SERPL-CCNC: 38 U/L
ANION GAP SERPL CALC-SCNC: 6 MMOL/L (ref 0–18)
AST SERPL-CCNC: 19 U/L (ref 15–37)
BILIRUB SERPL-MCNC: 0.4 MG/DL (ref 0.1–2)
BUN BLD-MCNC: 13 MG/DL (ref 7–18)
BUN/CREAT SERPL: 17.6 (ref 10–20)
CALCIUM BLD-MCNC: 9.3 MG/DL (ref 8.5–10.1)
CHLORIDE SERPL-SCNC: 103 MMOL/L (ref 98–112)
CHOLEST SERPL-MCNC: 133 MG/DL (ref ?–200)
CO2 SERPL-SCNC: 29 MMOL/L (ref 21–32)
CREAT BLD-MCNC: 0.74 MG/DL
DEPRECATED RDW RBC AUTO: 41.6 FL (ref 35.1–46.3)
ERYTHROCYTE [DISTWIDTH] IN BLOOD BY AUTOMATED COUNT: 12.8 % (ref 11–15)
EST. AVERAGE GLUCOSE BLD GHB EST-MCNC: 177 MG/DL (ref 68–126)
FASTING PATIENT LIPID ANSWER: YES
FASTING STATUS PATIENT QL REPORTED: YES
GLOBULIN PLAS-MCNC: 3.9 G/DL (ref 2.8–4.4)
GLUCOSE BLD-MCNC: 184 MG/DL (ref 70–99)
HBA1C MFR BLD: 7.8 % (ref ?–5.7)
HCT VFR BLD AUTO: 41.3 %
HDLC SERPL-MCNC: 27 MG/DL (ref 40–59)
HGB BLD-MCNC: 13.7 G/DL
LDLC SERPL CALC-MCNC: 29 MG/DL (ref ?–100)
MCH RBC QN AUTO: 29.2 PG (ref 26–34)
MCHC RBC AUTO-ENTMCNC: 33.2 G/DL (ref 31–37)
MCV RBC AUTO: 88.1 FL
NONHDLC SERPL-MCNC: 106 MG/DL (ref ?–130)
OSMOLALITY SERPL CALC.SUM OF ELEC: 291 MOSM/KG (ref 275–295)
PLATELET # BLD AUTO: 302 10(3)UL (ref 150–450)
POTASSIUM SERPL-SCNC: 4.1 MMOL/L (ref 3.5–5.1)
PROT SERPL-MCNC: 7.7 G/DL (ref 6.4–8.2)
RBC # BLD AUTO: 4.69 X10(6)UL
SODIUM SERPL-SCNC: 138 MMOL/L (ref 136–145)
TRIGL SERPL-MCNC: 559 MG/DL (ref 30–149)
TSI SER-ACNC: 1.41 MIU/ML (ref 0.36–3.74)
VIT B12 SERPL-MCNC: 333 PG/ML (ref 193–986)
VIT D+METAB SERPL-MCNC: 15.8 NG/ML (ref 30–100)
VLDLC SERPL CALC-MCNC: 74 MG/DL (ref 0–30)
WBC # BLD AUTO: 7.6 X10(3) UL (ref 4–11)

## 2022-05-06 PROCEDURE — 85027 COMPLETE CBC AUTOMATED: CPT

## 2022-05-06 PROCEDURE — 84443 ASSAY THYROID STIM HORMONE: CPT

## 2022-05-06 PROCEDURE — 82306 VITAMIN D 25 HYDROXY: CPT

## 2022-05-06 PROCEDURE — 82607 VITAMIN B-12: CPT

## 2022-05-06 PROCEDURE — 80053 COMPREHEN METABOLIC PANEL: CPT

## 2022-05-06 PROCEDURE — 36415 COLL VENOUS BLD VENIPUNCTURE: CPT

## 2022-05-06 PROCEDURE — 83036 HEMOGLOBIN GLYCOSYLATED A1C: CPT

## 2022-05-06 PROCEDURE — 80061 LIPID PANEL: CPT

## 2022-05-08 NOTE — PROGRESS NOTES
Results reviewed and will be sent to patient by my chart but please assist patient with a video visit as she is now newly diagnosed with diabetes and will need to start medications

## 2022-05-11 ENCOUNTER — TELEPHONE (OUTPATIENT)
Dept: INTERNAL MEDICINE CLINIC | Facility: CLINIC | Age: 37
End: 2022-05-11

## 2022-05-11 ENCOUNTER — TELEMEDICINE (OUTPATIENT)
Dept: INTERNAL MEDICINE CLINIC | Facility: CLINIC | Age: 37
End: 2022-05-11
Payer: COMMERCIAL

## 2022-05-11 DIAGNOSIS — E11.9 TYPE 2 DIABETES MELLITUS WITHOUT COMPLICATION, WITHOUT LONG-TERM CURRENT USE OF INSULIN (HCC): Primary | ICD-10-CM

## 2022-05-11 DIAGNOSIS — E55.9 VITAMIN D DEFICIENCY: ICD-10-CM

## 2022-05-11 PROCEDURE — 99213 OFFICE O/P EST LOW 20 MIN: CPT | Performed by: INTERNAL MEDICINE

## 2022-05-11 RX ORDER — BLOOD-GLUCOSE METER
1 KIT MISCELLANEOUS AS NEEDED
Qty: 1 EACH | Refills: 0 | Status: SHIPPED | OUTPATIENT
Start: 2022-05-11 | End: 2022-05-11

## 2022-05-11 RX ORDER — BLOOD-GLUCOSE METER
KIT MISCELLANEOUS
Qty: 1 EACH | Refills: 0 | Status: SHIPPED | OUTPATIENT
Start: 2022-05-11

## 2022-05-11 RX ORDER — LANCETS 28 GAUGE
EACH MISCELLANEOUS
Qty: 100 EACH | Refills: 3 | Status: SHIPPED | OUTPATIENT
Start: 2022-05-11

## 2022-05-11 RX ORDER — BLOOD SUGAR DIAGNOSTIC
STRIP MISCELLANEOUS
Qty: 100 STRIP | Refills: 3 | Status: SHIPPED | OUTPATIENT
Start: 2022-05-11

## 2022-05-11 RX ORDER — ERGOCALCIFEROL 1.25 MG/1
50000 CAPSULE ORAL WEEKLY
Qty: 4 CAPSULE | Refills: 3 | Status: SHIPPED | OUTPATIENT
Start: 2022-05-11

## 2022-05-11 NOTE — TELEPHONE ENCOUNTER
Pharmacy following up to clarify insturctions for Freestyle meter, also need order for lancets and strips. Per patient she was advised to test once daily. Scripts sent to pharmacy.

## 2022-06-22 ENCOUNTER — NURSE TRIAGE (OUTPATIENT)
Dept: INTERNAL MEDICINE CLINIC | Facility: CLINIC | Age: 37
End: 2022-06-22

## 2022-06-22 DIAGNOSIS — E11.9 TYPE 2 DIABETES MELLITUS WITHOUT COMPLICATION, WITHOUT LONG-TERM CURRENT USE OF INSULIN (HCC): ICD-10-CM

## 2022-06-22 NOTE — TELEPHONE ENCOUNTER
Called and spoke to pt   She had pizza last night and has been eating more for the last week or so   Watch diet and will increase the metformin  give refill  Encounter closed

## 2022-08-15 RX ORDER — FENOFIBRATE 145 MG/1
145 TABLET, COATED ORAL DAILY
Qty: 90 TABLET | Refills: 3 | Status: SHIPPED | OUTPATIENT
Start: 2022-08-15

## 2022-08-15 RX ORDER — LISINOPRIL AND HYDROCHLOROTHIAZIDE 25; 20 MG/1; MG/1
TABLET ORAL
Qty: 90 TABLET | Refills: 3 | Status: SHIPPED | OUTPATIENT
Start: 2022-08-15

## 2022-08-15 NOTE — TELEPHONE ENCOUNTER
Patient is requesting a refill request for two medication list below.  Would like them sent to [phone]      Fenofibrate 145 MG Oral Tab    Lisinopril-hydroCHLOROthiazide 20-25 MG Oral Tab

## 2022-08-18 ENCOUNTER — OFFICE VISIT (OUTPATIENT)
Dept: SURGERY | Facility: CLINIC | Age: 37
End: 2022-08-18
Payer: COMMERCIAL

## 2022-08-18 VITALS
HEIGHT: 68.9 IN | SYSTOLIC BLOOD PRESSURE: 126 MMHG | HEART RATE: 67 BPM | BODY MASS INDEX: 38.21 KG/M2 | WEIGHT: 258 LBS | DIASTOLIC BLOOD PRESSURE: 80 MMHG | OXYGEN SATURATION: 96 %

## 2022-08-18 DIAGNOSIS — F43.9 STRESS: ICD-10-CM

## 2022-08-18 DIAGNOSIS — I10 ESSENTIAL HYPERTENSION WITH GOAL BLOOD PRESSURE LESS THAN 140/90: ICD-10-CM

## 2022-08-18 DIAGNOSIS — Z51.81 ENCOUNTER FOR THERAPEUTIC DRUG MONITORING: ICD-10-CM

## 2022-08-18 DIAGNOSIS — E66.9 OBESITY (BMI 30-39.9): ICD-10-CM

## 2022-08-18 DIAGNOSIS — E11.9 TYPE 2 DIABETES MELLITUS WITHOUT COMPLICATION, WITHOUT LONG-TERM CURRENT USE OF INSULIN (HCC): Primary | ICD-10-CM

## 2022-08-18 PROBLEM — F33.1 MAJOR DEPRESSIVE DISORDER, RECURRENT, MODERATE (HCC): Status: ACTIVE | Noted: 2022-08-18

## 2022-08-18 PROCEDURE — 3008F BODY MASS INDEX DOCD: CPT | Performed by: INTERNAL MEDICINE

## 2022-08-18 PROCEDURE — 3074F SYST BP LT 130 MM HG: CPT | Performed by: INTERNAL MEDICINE

## 2022-08-18 PROCEDURE — 3079F DIAST BP 80-89 MM HG: CPT | Performed by: INTERNAL MEDICINE

## 2022-08-18 PROCEDURE — 99214 OFFICE O/P EST MOD 30 MIN: CPT | Performed by: INTERNAL MEDICINE

## 2022-08-18 RX ORDER — SEMAGLUTIDE 1.34 MG/ML
0.25 INJECTION, SOLUTION SUBCUTANEOUS
Qty: 1 PEN | Refills: 0 | Status: SHIPPED | OUTPATIENT
Start: 2022-08-18 | End: 2022-09-15

## 2022-09-01 ENCOUNTER — OFFICE VISIT (OUTPATIENT)
Dept: INTERNAL MEDICINE CLINIC | Facility: CLINIC | Age: 37
End: 2022-09-01
Payer: COMMERCIAL

## 2022-09-01 VITALS
WEIGHT: 253.63 LBS | HEIGHT: 68.9 IN | DIASTOLIC BLOOD PRESSURE: 74 MMHG | HEART RATE: 84 BPM | RESPIRATION RATE: 18 BRPM | SYSTOLIC BLOOD PRESSURE: 112 MMHG | BODY MASS INDEX: 37.56 KG/M2

## 2022-09-01 DIAGNOSIS — T75.3XXA MOTION SICKNESS, INITIAL ENCOUNTER: ICD-10-CM

## 2022-09-01 DIAGNOSIS — E55.9 VITAMIN D DEFICIENCY: ICD-10-CM

## 2022-09-01 DIAGNOSIS — E11.9 TYPE 2 DIABETES MELLITUS WITHOUT COMPLICATION, WITHOUT LONG-TERM CURRENT USE OF INSULIN (HCC): ICD-10-CM

## 2022-09-01 DIAGNOSIS — Z00.00 PHYSICAL EXAM, ANNUAL: Primary | ICD-10-CM

## 2022-09-01 PROCEDURE — 99395 PREV VISIT EST AGE 18-39: CPT | Performed by: INTERNAL MEDICINE

## 2022-09-01 PROCEDURE — 3008F BODY MASS INDEX DOCD: CPT | Performed by: INTERNAL MEDICINE

## 2022-09-01 PROCEDURE — 3078F DIAST BP <80 MM HG: CPT | Performed by: INTERNAL MEDICINE

## 2022-09-01 PROCEDURE — 3074F SYST BP LT 130 MM HG: CPT | Performed by: INTERNAL MEDICINE

## 2022-09-01 RX ORDER — SCOLOPAMINE TRANSDERMAL SYSTEM 1 MG/1
1 PATCH, EXTENDED RELEASE TRANSDERMAL
Qty: 2 PATCH | Refills: 0 | Status: SHIPPED | OUTPATIENT
Start: 2022-09-01

## 2022-11-09 DIAGNOSIS — E11.9 TYPE 2 DIABETES MELLITUS WITHOUT COMPLICATION, WITHOUT LONG-TERM CURRENT USE OF INSULIN (HCC): ICD-10-CM

## 2022-11-09 RX ORDER — SEMAGLUTIDE 1.34 MG/ML
0.25 INJECTION, SOLUTION SUBCUTANEOUS
Qty: 1.5 ML | Refills: 0 | Status: SHIPPED | OUTPATIENT
Start: 2022-11-09 | End: 2022-12-07

## 2022-11-10 DIAGNOSIS — E55.9 VITAMIN D DEFICIENCY: ICD-10-CM

## 2022-11-10 NOTE — TELEPHONE ENCOUNTER
[employer] message sent to do the repeat Vit D and other non fasting blood tests that were ordered last September 2022. Please review; protocol failed/no protocol. Requested Prescriptions   Pending Prescriptions Disp Refills    ERGOCALCIFEROL 1.25 MG (88749 UT) Oral Cap [Pharmacy Med Name: Vitamin D 50,000 Unit Cap Stri] 4 capsule 0     Sig: Take 1 capsule (50,000 Units total) by mouth once a week.        There is no refill protocol information for this order           Recent Outpatient Visits              2 months ago Physical exam, annual    Amina Pierre MD    Office Visit    2 months ago Major depressive disorder, recurrent, moderate [facility])    [address] [place], [address] [facility] Lynnette Manzano [place], PA-C    Office Visit    2 months ago Type 2 diabetes mellitus without complication, without long-term current use of insulin [facility])    Saima Argueta MD    Office Visit    6 months ago Type 2 diabetes mellitus without complication, without long-term current use of insulin [facility])    [address] [place], [address] [facility] Lamar Manzano Renee Me, MD    Telemedicine    6 months ago Sandra Anguiano MD    Office Visit             Future Appointments         Provider Department Appt Notes    In 1 week Isabella Ramos, [facility], [address] [facility]

## 2022-11-12 RX ORDER — ERGOCALCIFEROL 1.25 MG/1
50000 CAPSULE ORAL WEEKLY
Qty: 4 CAPSULE | Refills: 0 | OUTPATIENT
Start: 2022-11-12

## 2022-11-14 ENCOUNTER — HOSPITAL ENCOUNTER (OUTPATIENT)
Age: 37
Discharge: HOME | End: 2022-11-14
Attending: EMERGENCY MEDICINE
Payer: COMMERCIAL

## 2022-11-14 VITALS
HEART RATE: 80 BPM | RESPIRATION RATE: 18 BRPM | DIASTOLIC BLOOD PRESSURE: 80 MMHG | OXYGEN SATURATION: 97 % | TEMPERATURE: 98 F | SYSTOLIC BLOOD PRESSURE: 140 MMHG

## 2022-11-14 DIAGNOSIS — J02.9 VIRAL PHARYNGITIS: Primary | ICD-10-CM

## 2022-11-14 LAB — S PYO AG THROAT QL: NEGATIVE

## 2022-11-14 PROCEDURE — 87880 STREP A ASSAY W/OPTIC: CPT

## 2022-11-14 PROCEDURE — 99212 OFFICE O/P EST SF 10 MIN: CPT

## 2022-11-15 RX ORDER — CHOLECALCIFEROL (VITAMIN D3) 50 MCG
TABLET ORAL
Qty: 30 TABLET | Refills: 0 | COMMUNITY
Start: 2022-11-15

## 2022-12-28 ENCOUNTER — HOSPITAL ENCOUNTER (OUTPATIENT)
Age: 37
Discharge: HOME | End: 2022-12-28
Attending: EMERGENCY MEDICINE
Payer: COMMERCIAL

## 2022-12-28 VITALS
OXYGEN SATURATION: 96 % | DIASTOLIC BLOOD PRESSURE: 79 MMHG | TEMPERATURE: 98 F | RESPIRATION RATE: 20 BRPM | HEART RATE: 95 BPM | SYSTOLIC BLOOD PRESSURE: 139 MMHG

## 2022-12-28 DIAGNOSIS — J02.9 VIRAL PHARYNGITIS: Primary | ICD-10-CM

## 2022-12-28 DIAGNOSIS — B35.4 TINEA CORPORIS: ICD-10-CM

## 2022-12-28 LAB — S PYO AG THROAT QL: NEGATIVE

## 2022-12-28 PROCEDURE — 87880 STREP A ASSAY W/OPTIC: CPT

## 2022-12-28 PROCEDURE — 99213 OFFICE O/P EST LOW 20 MIN: CPT

## 2022-12-28 PROCEDURE — 99212 OFFICE O/P EST SF 10 MIN: CPT

## 2022-12-28 NOTE — ED INITIAL ASSESSMENT (HPI)
Patient reports sore throat, nausea starting today. Also right right eye redness and discharge. Reports hx of covid 2 months ago and influenza last month. Also right left axillary itchy rash for the last 1.5 months.

## 2022-12-28 NOTE — DISCHARGE INSTRUCTIONS
Continue over the counter antifungal cream. Follow up with primary care if not improving in one week

## 2022-12-30 ENCOUNTER — NURSE TRIAGE (OUTPATIENT)
Dept: INTERNAL MEDICINE CLINIC | Facility: CLINIC | Age: 37
End: 2022-12-30

## 2022-12-30 ENCOUNTER — TELEMEDICINE (OUTPATIENT)
Dept: INTERNAL MEDICINE CLINIC | Facility: CLINIC | Age: 37
End: 2022-12-30
Payer: COMMERCIAL

## 2022-12-30 DIAGNOSIS — H10.31 ACUTE CONJUNCTIVITIS OF RIGHT EYE, UNSPECIFIED ACUTE CONJUNCTIVITIS TYPE: ICD-10-CM

## 2022-12-30 DIAGNOSIS — J02.9 SORE THROAT: Primary | ICD-10-CM

## 2022-12-30 DIAGNOSIS — J06.9 URI WITH COUGH AND CONGESTION: ICD-10-CM

## 2022-12-30 PROCEDURE — 99214 OFFICE O/P EST MOD 30 MIN: CPT | Performed by: NURSE PRACTITIONER

## 2022-12-30 RX ORDER — AMOXICILLIN AND CLAVULANATE POTASSIUM 875; 125 MG/1; MG/1
1 TABLET, FILM COATED ORAL 2 TIMES DAILY
Qty: 20 TABLET | Refills: 0 | Status: SHIPPED | OUTPATIENT
Start: 2022-12-30 | End: 2023-01-09

## 2022-12-30 RX ORDER — TOBRAMYCIN 3 MG/ML
1 SOLUTION/ DROPS OPHTHALMIC EVERY 4 HOURS
Qty: 5 ML | Refills: 0 | Status: SHIPPED | OUTPATIENT
Start: 2022-12-30

## 2023-01-04 ENCOUNTER — PATIENT MESSAGE (OUTPATIENT)
Dept: INTERNAL MEDICINE CLINIC | Facility: CLINIC | Age: 38
End: 2023-01-04

## 2023-01-04 ENCOUNTER — TELEMEDICINE (OUTPATIENT)
Dept: INTERNAL MEDICINE CLINIC | Facility: CLINIC | Age: 38
End: 2023-01-04
Payer: COMMERCIAL

## 2023-01-04 DIAGNOSIS — I10 ESSENTIAL HYPERTENSION WITH GOAL BLOOD PRESSURE LESS THAN 140/90: Primary | ICD-10-CM

## 2023-01-04 DIAGNOSIS — E11.9 TYPE 2 DIABETES MELLITUS WITHOUT COMPLICATION, WITHOUT LONG-TERM CURRENT USE OF INSULIN (HCC): ICD-10-CM

## 2023-01-04 PROCEDURE — 99213 OFFICE O/P EST LOW 20 MIN: CPT | Performed by: INTERNAL MEDICINE

## 2023-01-05 NOTE — TELEPHONE ENCOUNTER
From: Esa Garcia  To: Louisa ElliskaitNELLIE [email or web address]  Sent: 1/4/2023 8:49 PM CST  Subject: Dallas Ferrara Doctor,    I know we spoke about a drs note for work and maybe you sent one but I cant find it. I did end up taking monday off as well so if at all possible, could you make the note for Dec. 30th 2022 thru Jan 2nd 2023? I would sincerely appreciate it. Also, thank you for your care. The pain in my throat was atleast halved by the next day on atibiotics. The relief of that alone carried me on to recovery lol so thanks again.     Jaki Ledezma

## 2023-01-09 NOTE — TELEPHONE ENCOUNTER
Patient is requesting refill for medication.  Pt also has new location for refill at [facility] on [facility].    sertraline 100 MG Oral Tab

## 2023-01-10 RX ORDER — SERTRALINE HYDROCHLORIDE 100 MG/1
100 TABLET, FILM COATED ORAL DAILY
Qty: 90 TABLET | Refills: 1 | Status: SHIPPED | OUTPATIENT
Start: 2023-01-10

## 2023-01-10 NOTE — TELEPHONE ENCOUNTER
Refill passed per [address] [place] protocol. Requested Prescriptions   Pending Prescriptions Disp Refills    sertraline 100 MG Oral Tab 90 tablet 0     Sig: Take 1 tablet (100 mg total) by mouth daily.        Psychiatric Non-Scheduled (Anti-Anxiety) Passed - 1/9/2023  3:17 PM        Passed - In person appointment or virtual visit in the past 6 mos or appointment in next 3 mos     Recent Outpatient Visits              6 days ago Essential hypertension with goal blood pressure less than 140/90    [address] [place], Layman Links, MD    Telemedicine    1 week ago Sore throat    [address] [place], [place], APRARTURO    Telemedicine    4 months ago Physical exam, annual    [address] Josie Coupe, MD    Office Visit    4 months ago Major depressive disorder, recurrent, moderate [facility])    [address] [place], [place], PA-C    Office Visit    4 months ago Type 2 diabetes mellitus without complication, without long-term current use of insulin [facility])    Roseanna Evans MD    Office Visit          Future Appointments         Provider Department Appt Notes    In 3 weeks Saulo Finn MD [address] [place] Follow up visit concerning overall health                     Future Appointments         Provider Department Appt Notes    In 3 weeks Saulo Finn MD [address], Patricia Sams Follow up visit concerning overall health             Recent Outpatient Visits              6 days ago Essential hypertension with goal blood pressure less than 140/90    [address] Josie Coupe, MD    Telemedicine    1 week ago Sore throat    [address] [place], [place], MINA    Telemedicine    4 months ago Physical exam, annual    [address] Kate Luke MD    Office Visit    4 months ago Major depressive disorder, recurrent, moderate [facility])    [address] [place], [address] [place] Manz[place], PA-C    Office Visit    4 months ago Type 2 diabetes mellitus without complication, without long-term current use of insulin [facility])    Cori Strickland, Gopi Whitmore MD    Office Visit

## 2023-01-12 ENCOUNTER — OFFICE VISIT (OUTPATIENT)
Dept: INTERNAL MEDICINE CLINIC | Facility: CLINIC | Age: 38
End: 2023-01-12

## 2023-01-12 ENCOUNTER — TELEPHONE (OUTPATIENT)
Dept: INTERNAL MEDICINE CLINIC | Facility: CLINIC | Age: 38
End: 2023-01-12

## 2023-01-12 VITALS
SYSTOLIC BLOOD PRESSURE: 126 MMHG | OXYGEN SATURATION: 96 % | DIASTOLIC BLOOD PRESSURE: 86 MMHG | HEART RATE: 94 BPM | HEIGHT: 68 IN | BODY MASS INDEX: 38.8 KG/M2 | RESPIRATION RATE: 16 BRPM | TEMPERATURE: 99 F | WEIGHT: 256 LBS

## 2023-01-12 DIAGNOSIS — R42 LIGHT HEADEDNESS: Primary | ICD-10-CM

## 2023-01-12 PROCEDURE — 99213 OFFICE O/P EST LOW 20 MIN: CPT | Performed by: NURSE PRACTITIONER

## 2023-01-12 PROCEDURE — 3008F BODY MASS INDEX DOCD: CPT | Performed by: NURSE PRACTITIONER

## 2023-01-12 PROCEDURE — 3074F SYST BP LT 130 MM HG: CPT | Performed by: NURSE PRACTITIONER

## 2023-01-12 PROCEDURE — 3079F DIAST BP 80-89 MM HG: CPT | Performed by: NURSE PRACTITIONER

## 2023-01-12 NOTE — PATIENT INSTRUCTIONS
Continue to increase your fluid intake     Take sertaline as prescribed    Monitor your blood sugars and record results     Follow up if your symptoms do not improve

## 2023-01-12 NOTE — TELEPHONE ENCOUNTER
Condition update from visit 1/4/23: Patient was treated for strep last week, symptoms improved but then 2 days later she started feeling sick again. Patient reports for the past 4 days she has felt \"woozy\" and unbalanced. Per patient this is not dizziness or vertigo. Denies any slurring of works, facial drooping or loss of function. Patient also has cough, congestion, post nasal drip and nasal drainage. Afebrile. Patient would like to schedule in person visit:     Future Appointments   Date Time Provider Chance Tang   1/12/2023  2:00 PM MINA [facility]   2/2/2023  9:40 AM Tiana Simon MD [facility]     Reviewed emergency symptoms and when patient should be seen sooner in ER/ICC.

## 2023-02-16 ENCOUNTER — TELEPHONE (OUTPATIENT)
Dept: INTERNAL MEDICINE CLINIC | Facility: CLINIC | Age: 38
End: 2023-02-16

## 2023-02-16 ENCOUNTER — OFFICE VISIT (OUTPATIENT)
Dept: INTERNAL MEDICINE CLINIC | Facility: CLINIC | Age: 38
End: 2023-02-16

## 2023-02-16 ENCOUNTER — LAB ENCOUNTER (OUTPATIENT)
Dept: LAB | Age: 38
End: 2023-02-16
Attending: NURSE PRACTITIONER
Payer: COMMERCIAL

## 2023-02-16 VITALS
HEIGHT: 68 IN | SYSTOLIC BLOOD PRESSURE: 130 MMHG | WEIGHT: 255 LBS | BODY MASS INDEX: 38.65 KG/M2 | DIASTOLIC BLOOD PRESSURE: 70 MMHG | HEART RATE: 92 BPM

## 2023-02-16 DIAGNOSIS — E11.9 TYPE 2 DIABETES MELLITUS WITHOUT COMPLICATION, WITHOUT LONG-TERM CURRENT USE OF INSULIN (HCC): ICD-10-CM

## 2023-02-16 DIAGNOSIS — L02.91 ABSCESS: Primary | ICD-10-CM

## 2023-02-16 DIAGNOSIS — E55.9 VITAMIN D DEFICIENCY: ICD-10-CM

## 2023-02-16 LAB
EST. AVERAGE GLUCOSE BLD GHB EST-MCNC: 180 MG/DL (ref 68–126)
HBA1C MFR BLD: 7.9 % (ref ?–5.7)
VIT D+METAB SERPL-MCNC: 13.7 NG/ML (ref 30–100)

## 2023-02-16 PROCEDURE — 99214 OFFICE O/P EST MOD 30 MIN: CPT | Performed by: NURSE PRACTITIONER

## 2023-02-16 PROCEDURE — 3008F BODY MASS INDEX DOCD: CPT | Performed by: NURSE PRACTITIONER

## 2023-02-16 PROCEDURE — 82306 VITAMIN D 25 HYDROXY: CPT

## 2023-02-16 PROCEDURE — 3075F SYST BP GE 130 - 139MM HG: CPT | Performed by: NURSE PRACTITIONER

## 2023-02-16 PROCEDURE — 3078F DIAST BP <80 MM HG: CPT | Performed by: NURSE PRACTITIONER

## 2023-02-16 PROCEDURE — 83036 HEMOGLOBIN GLYCOSYLATED A1C: CPT

## 2023-02-16 PROCEDURE — 36415 COLL VENOUS BLD VENIPUNCTURE: CPT

## 2023-02-16 PROCEDURE — 3051F HG A1C>EQUAL 7.0%<8.0%: CPT | Performed by: INTERNAL MEDICINE

## 2023-02-16 RX ORDER — DOXYCYCLINE 100 MG/1
100 CAPSULE ORAL 2 TIMES DAILY
Qty: 28 CAPSULE | Refills: 0 | Status: SHIPPED | OUTPATIENT
Start: 2023-02-16 | End: 2023-03-02

## 2023-02-16 RX ORDER — DOXYCYCLINE 100 MG/1
100 CAPSULE ORAL 2 TIMES DAILY
Qty: 14 CAPSULE | Refills: 0 | Status: SHIPPED | OUTPATIENT
Start: 2023-02-16 | End: 2023-02-16

## 2023-02-16 NOTE — TELEPHONE ENCOUNTER
Contacted pharmacist from [facility] (name and  of patient verified). She states medication is ready for pickup. Contacted patient (name and  of patient verified). Relayed message above, verbalizes understanding.

## 2023-02-16 NOTE — ASSESSMENT & PLAN NOTE
Perineal abscess for the past two week. It started draining yesterday and has reduced in size. Plan    mupirocin 2 % External Ointment Apply 1 Application. topically 3 (three) times daily. Doxycycline Monohydrate 100 MG Oral Cap Take 1 capsule (100 mg total) by mouth 2 (two) times daily for 14 days. Warm compresses four times a day/ Warm sitz baths. Call if symptoms worsen or do not improve.

## 2023-02-16 NOTE — TELEPHONE ENCOUNTER
Patient states pharmacy where medication was sent to does not have it available requesting to please send to the following pharmacy,    [address] [place] [address] in [facility]    Doxycycline Monohydrate 100 MG Oral Cap 28 capsule 0 2/16/2023 3/2/2023    Sig - Route:  Take 1 capsule (100 mg total) by mouth 2 (two) times daily for 14 days. - Oral    Sent to pharmacy as: Doxycycline Monohydrate 100 MG Oral Capsule (MONODOX)    E-Prescribing Status: Receipt confirmed by pharmacy (2/16/2023 10:55 AM CST)

## 2023-02-16 NOTE — ASSESSMENT & PLAN NOTE
Was treated with high dose vitamin D. Now taking Vitamin D 2000 units per day.     Plan  Instructed to repeat vitamin D level that her PCP ordered

## 2023-02-16 NOTE — PATIENT INSTRUCTIONS
Warm compresses several times a day- Warm sitz baths. Medication as ordered    Drink water with lemon    Essentia water three bottles per week. Discussed lifestyle modifications including reductions in dietary total and saturated fat, weight loss, aerobic exercise, and eating a diet rich in fruits and vegetables. Reduce bread, pasta and rice in your diet. Eliminate as much sugar from your diet as possible.

## 2023-02-16 NOTE — ASSESSMENT & PLAN NOTE
Type II diabetes. Glucose readings running in the 180. Patient is due for a hemoglobin A1C that was ordered by her PCP. Plan  Discussed lifestyle modifications including reductions in dietary total and saturated fat, weight loss, aerobic exercise, and eating a diet rich in fruits and vegetables. Reduce bread, pasta and rice in your diet. Eliminate as much sugar from your diet as possible.     Hydrate with water and lemon  Essentia water two bottles per week

## 2023-03-11 ENCOUNTER — TELEMEDICINE (OUTPATIENT)
Dept: INTERNAL MEDICINE CLINIC | Facility: CLINIC | Age: 38
End: 2023-03-11

## 2023-03-11 DIAGNOSIS — U07.1 COVID: Primary | ICD-10-CM

## 2023-03-11 LAB — AMB EXT COVID-19 RESULT: DETECTED

## 2023-03-11 PROCEDURE — 99213 OFFICE O/P EST LOW 20 MIN: CPT | Performed by: PHYSICIAN ASSISTANT

## 2023-03-11 RX ORDER — BENZONATATE 200 MG/1
200 CAPSULE ORAL 3 TIMES DAILY PRN
Qty: 30 CAPSULE | Refills: 0 | Status: SHIPPED | OUTPATIENT
Start: 2023-03-11

## 2023-03-20 ENCOUNTER — PATIENT MESSAGE (OUTPATIENT)
Dept: INTERNAL MEDICINE CLINIC | Facility: CLINIC | Age: 38
End: 2023-03-20

## 2023-03-20 NOTE — TELEPHONE ENCOUNTER
From: Prabhakar Gordon  To: Eduardo Green MD  Sent: 3/20/2023 9:33 AM CDT  Subject: Probiotics    Hi,  I saw this probiotic/weight management/digestive aid (?) I dont know what to classify it as. Anyway, it's biox4, and I did some light research on it; fda approved, third party tested and such. I patel want to try it but wanted to make sure it wouldnt interact with anything I'm currently taking. Attached is a screenshot of it's ingredients. I was hoping we could briefly discuss it at my next appointment on the 29th. Thanks for any advice you might have.     Yordan Hernández

## 2023-03-29 ENCOUNTER — LAB ENCOUNTER (OUTPATIENT)
Dept: LAB | Age: 38
End: 2023-03-29
Attending: INTERNAL MEDICINE
Payer: COMMERCIAL

## 2023-03-29 ENCOUNTER — OFFICE VISIT (OUTPATIENT)
Dept: INTERNAL MEDICINE CLINIC | Facility: CLINIC | Age: 38
End: 2023-03-29

## 2023-03-29 VITALS
BODY MASS INDEX: 37.62 KG/M2 | HEIGHT: 68 IN | HEART RATE: 74 BPM | RESPIRATION RATE: 20 BRPM | DIASTOLIC BLOOD PRESSURE: 68 MMHG | OXYGEN SATURATION: 98 % | WEIGHT: 248.19 LBS | SYSTOLIC BLOOD PRESSURE: 122 MMHG

## 2023-03-29 DIAGNOSIS — E55.9 VITAMIN D DEFICIENCY: ICD-10-CM

## 2023-03-29 DIAGNOSIS — Z00.00 PHYSICAL EXAM, ANNUAL: ICD-10-CM

## 2023-03-29 DIAGNOSIS — E11.9 TYPE 2 DIABETES MELLITUS WITHOUT COMPLICATION, WITHOUT LONG-TERM CURRENT USE OF INSULIN (HCC): ICD-10-CM

## 2023-03-29 DIAGNOSIS — I10 ESSENTIAL HYPERTENSION WITH GOAL BLOOD PRESSURE LESS THAN 140/90: Primary | ICD-10-CM

## 2023-03-29 LAB
CREAT UR-SCNC: 229 MG/DL
MICROALBUMIN UR-MCNC: 1.88 MG/DL
MICROALBUMIN/CREAT 24H UR-RTO: 8.2 UG/MG (ref ?–30)

## 2023-03-29 PROCEDURE — 3078F DIAST BP <80 MM HG: CPT | Performed by: INTERNAL MEDICINE

## 2023-03-29 PROCEDURE — 3074F SYST BP LT 130 MM HG: CPT | Performed by: INTERNAL MEDICINE

## 2023-03-29 PROCEDURE — 99214 OFFICE O/P EST MOD 30 MIN: CPT | Performed by: INTERNAL MEDICINE

## 2023-03-29 PROCEDURE — 82043 UR ALBUMIN QUANTITATIVE: CPT

## 2023-03-29 PROCEDURE — 82570 ASSAY OF URINE CREATININE: CPT

## 2023-03-29 PROCEDURE — 3008F BODY MASS INDEX DOCD: CPT | Performed by: INTERNAL MEDICINE

## 2023-03-29 RX ORDER — SEMAGLUTIDE 1.34 MG/ML
0.25 INJECTION, SOLUTION SUBCUTANEOUS
Qty: 1.5 ML | Refills: 0 | Status: CANCELLED | OUTPATIENT
Start: 2023-03-29 | End: 2023-04-26

## 2023-04-12 ENCOUNTER — TELEMEDICINE (OUTPATIENT)
Dept: INTERNAL MEDICINE CLINIC | Facility: CLINIC | Age: 38
End: 2023-04-12

## 2023-04-12 DIAGNOSIS — J06.9 UPPER RESPIRATORY TRACT INFECTION, UNSPECIFIED TYPE: Primary | ICD-10-CM

## 2023-04-12 PROCEDURE — 99213 OFFICE O/P EST LOW 20 MIN: CPT | Performed by: PHYSICIAN ASSISTANT

## 2023-04-12 RX ORDER — FLUTICASONE PROPIONATE 50 MCG
2 SPRAY, SUSPENSION (ML) NASAL DAILY
Qty: 1 EACH | Refills: 0 | Status: SHIPPED | OUTPATIENT
Start: 2023-04-12

## 2023-04-12 RX ORDER — AZITHROMYCIN 250 MG/1
TABLET, FILM COATED ORAL
Qty: 6 TABLET | Refills: 0 | Status: SHIPPED | OUTPATIENT
Start: 2023-04-12 | End: 2023-04-17

## 2023-05-05 ENCOUNTER — NURSE TRIAGE (OUTPATIENT)
Dept: INTERNAL MEDICINE CLINIC | Facility: CLINIC | Age: 38
End: 2023-05-05

## 2023-05-05 ENCOUNTER — HOSPITAL ENCOUNTER (EMERGENCY)
Facility: HOSPITAL | Age: 38
Discharge: HOME | End: 2023-05-05
Attending: EMERGENCY MEDICINE
Payer: COMMERCIAL

## 2023-05-05 ENCOUNTER — APPOINTMENT (OUTPATIENT)
Dept: GENERAL RADIOLOGY | Facility: HOSPITAL | Age: 38
End: 2023-05-05
Attending: EMERGENCY MEDICINE
Payer: COMMERCIAL

## 2023-05-05 VITALS
TEMPERATURE: 97 F | RESPIRATION RATE: 16 BRPM | WEIGHT: 250 LBS | HEIGHT: 69 IN | DIASTOLIC BLOOD PRESSURE: 72 MMHG | HEART RATE: 76 BPM | OXYGEN SATURATION: 95 % | SYSTOLIC BLOOD PRESSURE: 134 MMHG | BODY MASS INDEX: 37.03 KG/M2

## 2023-05-05 DIAGNOSIS — R07.89 CHEST PAIN, ATYPICAL: Primary | ICD-10-CM

## 2023-05-05 LAB
ANION GAP SERPL CALC-SCNC: 8 MMOL/L (ref 0–18)
BASOPHILS # BLD AUTO: 0.05 X10(3) UL (ref 0–0.2)
BASOPHILS NFR BLD AUTO: 0.6 %
BUN BLD-MCNC: 18 MG/DL (ref 7–18)
BUN/CREAT SERPL: 16.5 (ref 10–20)
CALCIUM BLD-MCNC: 10 MG/DL (ref 8.5–10.1)
CHLORIDE SERPL-SCNC: 105 MMOL/L (ref 98–112)
CO2 SERPL-SCNC: 28 MMOL/L (ref 21–32)
CREAT BLD-MCNC: 1.09 MG/DL
DEPRECATED RDW RBC AUTO: 42.3 FL (ref 35.1–46.3)
EOSINOPHIL # BLD AUTO: 0.33 X10(3) UL (ref 0–0.7)
EOSINOPHIL NFR BLD AUTO: 3.7 %
ERYTHROCYTE [DISTWIDTH] IN BLOOD BY AUTOMATED COUNT: 12.9 % (ref 11–15)
GFR SERPLBLD BASED ON 1.73 SQ M-ARVRAT: 66 ML/MIN/1.73M2 (ref 60–?)
GLUCOSE BLD-MCNC: 225 MG/DL (ref 70–99)
HCT VFR BLD AUTO: 43 %
HGB BLD-MCNC: 14.4 G/DL
IMM GRANULOCYTES # BLD AUTO: 0.08 X10(3) UL (ref 0–1)
IMM GRANULOCYTES NFR BLD: 0.9 %
LYMPHOCYTES # BLD AUTO: 2.07 X10(3) UL (ref 1–4)
LYMPHOCYTES NFR BLD AUTO: 23 %
MCH RBC QN AUTO: 29.8 PG (ref 26–34)
MCHC RBC AUTO-ENTMCNC: 33.5 G/DL (ref 31–37)
MCV RBC AUTO: 88.8 FL
MONOCYTES # BLD AUTO: 0.49 X10(3) UL (ref 0.1–1)
MONOCYTES NFR BLD AUTO: 5.4 %
NEUTROPHILS # BLD AUTO: 5.98 X10 (3) UL (ref 1.5–7.7)
NEUTROPHILS # BLD AUTO: 5.98 X10(3) UL (ref 1.5–7.7)
NEUTROPHILS NFR BLD AUTO: 66.4 %
OSMOLALITY SERPL CALC.SUM OF ELEC: 301 MOSM/KG (ref 275–295)
PLATELET # BLD AUTO: 312 10(3)UL (ref 150–450)
POTASSIUM SERPL-SCNC: 4.4 MMOL/L (ref 3.5–5.1)
RBC # BLD AUTO: 4.84 X10(6)UL
SODIUM SERPL-SCNC: 141 MMOL/L (ref 136–145)
TROPONIN I HIGH SENSITIVITY: 4 NG/L
WBC # BLD AUTO: 9 X10(3) UL (ref 4–11)

## 2023-05-05 PROCEDURE — 93010 ELECTROCARDIOGRAM REPORT: CPT

## 2023-05-05 PROCEDURE — 36415 COLL VENOUS BLD VENIPUNCTURE: CPT

## 2023-05-05 PROCEDURE — 80048 BASIC METABOLIC PNL TOTAL CA: CPT | Performed by: EMERGENCY MEDICINE

## 2023-05-05 PROCEDURE — 93005 ELECTROCARDIOGRAM TRACING: CPT

## 2023-05-05 PROCEDURE — 71045 X-RAY EXAM CHEST 1 VIEW: CPT | Performed by: EMERGENCY MEDICINE

## 2023-05-05 PROCEDURE — 99285 EMERGENCY DEPT VISIT HI MDM: CPT

## 2023-05-05 PROCEDURE — 85025 COMPLETE CBC W/AUTO DIFF WBC: CPT | Performed by: EMERGENCY MEDICINE

## 2023-05-05 PROCEDURE — 99284 EMERGENCY DEPT VISIT MOD MDM: CPT

## 2023-05-05 PROCEDURE — 84484 ASSAY OF TROPONIN QUANT: CPT | Performed by: EMERGENCY MEDICINE

## 2023-05-05 NOTE — ED INITIAL ASSESSMENT (HPI)
Pt reports CP this morning from 1040 to 1300, pt was told to come in by MD office. No further complaints. A/ox4, respirations unlabored, speech full/clear, gait steady, no acute distress noted. Currently denies pain.

## 2023-05-05 NOTE — TELEPHONE ENCOUNTER
Patient called back stated did not go to the ER cause the ambulance stated EKG show nothing wrong and gave her the option,since her pain was better.     Advised her to proceed to the ER so further workup can be done    Will proceed to [facility] ER

## 2023-05-06 LAB
ATRIAL RATE: 79 BPM
P AXIS: 44 DEGREES
P-R INTERVAL: 152 MS
Q-T INTERVAL: 374 MS
QRS DURATION: 88 MS
QTC CALCULATION (BEZET): 428 MS
R AXIS: 65 DEGREES
T AXIS: 19 DEGREES
VENTRICULAR RATE: 79 BPM

## 2023-06-08 NOTE — TELEPHONE ENCOUNTER
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

## 2023-06-12 ENCOUNTER — TELEMEDICINE (OUTPATIENT)
Dept: INTERNAL MEDICINE CLINIC | Facility: CLINIC | Age: 38
End: 2023-06-12

## 2023-06-12 DIAGNOSIS — R19.7 DIARRHEA, UNSPECIFIED TYPE: ICD-10-CM

## 2023-06-12 DIAGNOSIS — E11.9 TYPE 2 DIABETES MELLITUS WITHOUT COMPLICATION, WITHOUT LONG-TERM CURRENT USE OF INSULIN (HCC): Primary | ICD-10-CM

## 2023-06-12 PROCEDURE — 99214 OFFICE O/P EST MOD 30 MIN: CPT | Performed by: INTERNAL MEDICINE

## 2023-06-13 ENCOUNTER — PATIENT MESSAGE (OUTPATIENT)
Dept: INTERNAL MEDICINE CLINIC | Facility: CLINIC | Age: 38
End: 2023-06-13

## 2023-06-13 NOTE — TELEPHONE ENCOUNTER
From: Tomás Jorge  To: Yney Jean MD  Sent: 6/13/2023 9:53 AM CDT  Subject: Last visit    Hi Dr. Mavis Sanz and staff,  When we spoke yesterday, I was confident today would be better and I was out of sick time so I was going to go in to work one way or another. After recent events I've made the executive decision to give it one more day. I actually feel more nauseous now than yesterday. Would it be possible to get a note excusing me today as well? Sincerely,  Izabella Oliva  Texted from phone in the washroom.

## 2023-06-20 ENCOUNTER — TELEPHONE (OUTPATIENT)
Dept: INTERNAL MEDICINE CLINIC | Facility: CLINIC | Age: 38
End: 2023-06-20

## 2023-06-20 NOTE — TELEPHONE ENCOUNTER
Pt states that the note that was given to her had the wrong date on it. Per the patient she would like a new note with the date of 6-13-23 and the other date of 6-16-23. Pt states that she needs this note to excuse her from work no later than 6-23-23. Pt would like the note added to her my chart. Please, call the patient with any questions. Mana Holliday

## 2023-06-22 NOTE — TELEPHONE ENCOUNTER
Called pt to clarify- letter sent to [facility] - pt aware   Encounter closed

## 2023-07-29 RX ORDER — SERTRALINE HYDROCHLORIDE 100 MG/1
100 TABLET, FILM COATED ORAL DAILY
Qty: 90 TABLET | Refills: 3 | Status: SHIPPED | OUTPATIENT
Start: 2023-07-29

## 2023-07-29 NOTE — TELEPHONE ENCOUNTER
Refill passed per [employer], [facility] protocol.     Requested Prescriptions   Pending Prescriptions Disp Refills    SERTRALINE 100 MG Oral Tab [Pharmacy Med Name: SERTRALINE  MG TABLET] 90 tablet 1     Sig: TAKE 1 TABLET BY MOUTH EVERY DAY       Psychiatric Non-Scheduled (Anti-Anxiety) Passed - 7/29/2023  7:59 AM        Passed - In person appointment or virtual visit in the past 6 mos or appointment in next 3 mos     Recent Outpatient Visits              1 month ago Type 2 diabetes mellitus without complication, without long-term current use of insulin ([facility]    [address], Jeanette Ramos MD    Telemedicine    3 months ago Upper respiratory tract infection, unspecified type    Hung Dee [place], [place], PA-C    Telemedicine    4 months ago Essential hypertension with goal blood pressure less than 140/90    Hung Dee Manson Morning, MD    Office Visit    4 months ago [place], [place], PA-C    Telemedicine    5 months ago Abscess    [address], Denzel Ramos Lethaniel Hale, APRN    Office Visit          Future Appointments         Provider Department Appt Notes    In 2 months Ant Landin MD [address], Patricia Ramos Diabetes follow up                    Recent Outpatient Visits              1 month ago Type 2 diabetes mellitus without complication, without long-term current use of insulin [facility])    [address], Jeanette Ramos MD    Telemedicine    3 months ago Upper respiratory tract infection, unspecified type    Edward[facility] Jarrett [place], [place] PA-C    Telemedicine    4 months ago Essential hypertension with goal blood pressure less than 140/90    [facility], Elizabeth Martinez MD    Office Visit    4 months ago Brooke Mai, [address] [place], [place]    Telemedicine    5 months ago Denzel Fernandes Rosamond Skeen, APRN    Office Visit            Future Appointments         Provider Department Appt Notes    In 2 months GyMD Ignacio Robin, Patricia Sams Diabetes follow up

## 2023-07-29 NOTE — TELEPHONE ENCOUNTER
Current Outpatient Medications:     fenofibrate 145 MG Oral Tab, Take 1 tablet (145 mg total) by mouth daily. , Disp: 90 tablet, Rfl: 3    C  lisinopril-hydroCHLOROthiazide 20-25 MG Oral Tab, TAKE ONE TABLET BY MOUTH ONE TIME DAILY. , Disp: 90 tablet, Rfl: 3

## 2023-07-31 RX ORDER — LISINOPRIL AND HYDROCHLOROTHIAZIDE 25; 20 MG/1; MG/1
TABLET ORAL
Qty: 90 TABLET | Refills: 3 | Status: SHIPPED | OUTPATIENT
Start: 2023-07-31

## 2023-07-31 NOTE — TELEPHONE ENCOUNTER
Please review; protocol failed. No future labs noted for Lipids     Requested Prescriptions   Pending Prescriptions Disp Refills    fenofibrate 145 MG Oral Tab 90 tablet 3     Sig: Take 1 tablet (145 mg total) by mouth daily. Cholesterol Medication Protocol Failed - 7/31/2023  8:23 AM        Failed - ALT in past 12 months        Failed - LDL in past 12 months        Failed - Last ALT < 80     Lab Results   Component Value Date    ALT 38 05/06/2022             Failed - Last LDL < 130     Lab Results   Component Value Date    LDL 29 05/06/2022             Passed - In person appointment or virtual visit in the past 12 mos or appointment in next 3 mos     Recent Outpatient Visits              1 month ago Type 2 diabetes mellitus without complication, without long-term current use of insulin ([facility]    [address], [address] Shelby Basurto MD    Telemedicine    3 months ago Upper respiratory tract infection, unspecified type    [facility] Patricia Basurto [place], PA-C    Telemedicine    4 months ago Essential hypertension with goal blood pressure less than 140/90    [facility], Shelby Sams MD    Office Visit    4 months ago [facility] [place], Patricia Sams [place], PA-C    Telemedicine    5 months ago [facility]    [address], [address] Jose A Basurto APRN    Office Visit          Future Appointments         Provider Department Appt Notes    In 1 month Shireen Hernandez MD [address], [address] Patricia Basurto Diabetes follow up                Signed Prescriptions Disp Refills    lisinopril-hydroCHLOROthiazide 20-25 MG Oral Tab 90 tablet 3     Sig: TAKE ONE TABLET BY MOUTH ONE TIME DAILY.        Hypertensive Medications Protocol Passed - 7/31/2023  8:23 AM        Passed - In person appointment in the past 12 or next 3 months     Recent Outpatient Visits              1 month ago Type 2 diabetes mellitus without complication, without long-term current use of insulin ([facility]    [facility], [address], Saint[place] Lusher, MD    Telemedicine    3 months ago Upper respiratory tract infection, unspecified type    [facility], [facility] [place] PA-C    Telemedicine    4 months ago Essential hypertension with goal blood pressure less than 140/90    [facility], [address] [place], [place] [address]pahoLamar hadley Dalia Minion, MD    Office Visit    4 months ago Juno eYpez, [place] [facility] had[facility] [place], PA-C    Telemedicine    5 months ago [facility], [place] [facility] had[facility] Arletta Pais, APRN    Office Visit          Future Appointments         Provider Department Appt Notes    In 1 month Franck Carr MD [facility], [address] [place], [place] [address]jomar [place] Diabetes follow up               Passed - Last BP reading less than 140/90     BP Readings from Last 1 Encounters:  05/05/23 : 134/72              Passed - CMP or BMP in past 6 months     Recent Results (from the past 4392 hour(s))   Basic Metabolic Panel (8)    Collection Time: 05/05/23  5:25 PM   Result Value Ref Range    Glucose 225 (H) 70 - 99 mg/dL    Sodium 141 136 - 145 mmol/L    Potassium 4.4 3.5 - 5.1 mmol/L    Chloride 105 98 - 112 mmol/L    CO2 28.0 21.0 - 32.0 mmol/L    Anion Gap 8 0 - 18 mmol/L    BUN 18 7 - 18 mg/dL    Creatinine 1.09 (H) 0.55 - 1.02 mg/dL    BUN/CREA Ratio 16.5 10.0 - 20.0    Calcium, Total 10.0 8.5 - 10.1 mg/dL    Calculated Osmolality 301 (H) 275 - 295 mOsm/kg    eGFR-Cr 66 >=60 mL/min/1.73m2     *Note: Due to a large number of results and/or encounters for the requested time period, some results have not been displayed. A complete set of results can be found in Results Review.                Passed - In person appointment or virtual visit in the past 6 months     Recent Outpatient Visits              1 month ago Type 2 diabetes mellitus without complication, without long-term current use of insulin ([address]    [place] Pate Myrtie Deis, MD    Telemedicine    3 months ago Upper respiratory tract infection, unspecified type    [facility], Patricia Sams Le monrealmi, PA-C    Telemedicine    4 months ago Essential hypertension with goal blood pressure less than 140/90    [facility], Penny Sams MD    Office Visit    4 months ago Juno Yepez, Patricia Sams jocelin Mabie, PA-C    Telemedicine    5 months ago Abscess    [place] Pate Shiprock, APRN    Office Visit          Future Appointments         Provider Department Appt Notes    In 1 month MD [facility], [address], [place] Diabetes follow up               [facility] or GFRNAA > 50     GFR Evaluation  EGFRCR: 66 , resulted on 5/5/2023             Future Appointments         Provider Department Appt Notes    In 1 month MD Deysi Shook [address], [place] Diabetes follow up          Recent Outpatient Visits              1 month ago Type 2 diabetes mellitus without complication, without long-term current use of insulin [facility])    [place] Pate Myrtie Deis, MD    Telemedicine    3 months ago Upper respiratory tract infection, unspecified type    [facility], Patricia Sams jocelin Mabie, PA-C    Telemedicine    4 months ago Essential hypertension with goal blood pressure less than 140/90    Penny Randall MD    Office Visit    4 months ago [facility], [place], [place]    Telemedicine    5 months ago [facility], Denzel Perdomo Welby Berger, APRN    Office Visit

## 2023-07-31 NOTE — TELEPHONE ENCOUNTER
Refill passed per [employer] protocol. Requested Prescriptions   Pending Prescriptions Disp Refills    lisinopril-hydroCHLOROthiazide 20-25 MG Oral Tab 90 tablet 3     Sig: TAKE ONE TABLET BY MOUTH ONE TIME DAILY.        Hypertensive Medications Protocol Passed - 7/31/2023  8:23 AM        Passed - In person appointment in the past 12 or next 3 months     Recent Outpatient Visits              1 month ago Type 2 diabetes mellitus without complication, without long-term current use of insulin ([facility]    [employer] [facility], Willard Sams MD    Telemedicine    3 months ago Upper respiratory tract infection, unspecified type    [facility], Patricia Sams [place], PA-C    Telemedicine    4 months ago Essential hypertension with goal blood pressure less than 140/90    [facility], Lamar Sams Bentley Medina, MD    Office Visit    4 months ago Jun[address] Ye[address] [place], [place]    Telemedicine    5 months ago [facility] [employer], Denzel Sams [facility]    Office Visit          Future Appointments         Provider Department Appt Notes    In 1 month Bipin Moran MD [employer] [facility], [place] Manzano [place] Diabetes follow up               Passed - Last BP reading less than 140/90     BP Readings from Last 1 Encounters:  05/05/23 : 134/72              Passed - CMP or BMP in past 6 months     Recent Results (from the past 4392 hour(s))   Basic Metabolic Panel (8)    Collection Time: 05/05/23  5:25 PM   Result Value Ref Range    Glucose 225 (H) 70 - 99 mg/dL    Sodium 141 136 - 145 mmol/L    Potassium 4.4 3.5 - 5.1 mmol/L    Chloride 105 98 - 112 mmol/L    CO2 28.0 21.0 - 32.0 mmol/L    Anion Gap 8 0 - 18 mmol/L    BUN 18 7 - 18 mg/dL    Creatinine 1.09 (H) 0.55 - 1.02 mg/dL    BUN/CREA Ratio 16.5 10.0 - 20.0 Calcium, Total 10.0 8.5 - 10.1 mg/dL    Calculated Osmolality 301 (H) 275 - 295 mOsm/kg    eGFR-Cr 66 >=60 mL/min/1.73m2     *Note: Due to a large number of results and/or encounters for the requested time period, some results have not been displayed. A complete set of results can be found in Results Review. Passed - In person appointment or virtual visit in the past 6 months     Recent Outpatient Visits              1 month ago Type 2 diabetes mellitus without complication, without long-term current use of insulin ([facility]    [place] Marmolejo Skip Both, MD    Telemedicine    3 months ago Upper respiratory tract infection, unspecified type    [facility], Patricia Sams [place], PA-C    Telemedicine    4 months ago Essential hypertension with goal blood pressure less than 140/90    [facility] Skip Both, MD    Office Visit    4 months ago [place] Gillis [place], [place], PA-C    Telemedicine    5 months ago [place] Gurrola Shiprock, APRN    Office Visit          Future Appointments         Provider Department Appt Notes    In 1 month MD Elly Garland [address], [place] Diabetes follow up               Passed [facility] or GFRNAA > 50     GFR Evaluation  EGFRCR: 66 , resulted on 5/5/2023            fenofibrate 145 MG Oral Tab 90 tablet 3     Sig: Take 1 tablet (145 mg total) by mouth daily.        Cholesterol Medication Protocol Failed - 7/31/2023  8:23 AM        Failed - ALT in past 12 months        Failed - LDL in past 12 months        Failed - Last ALT < 80     Lab Results   Component Value Date    ALT 38 05/06/2022             Failed - Last LDL < 130     Lab Results   Component Value Date    LDL 29 05/06/2022             Passed - In person appointment or virtual visit in the past 12 mos or appointment in next 3 mos     Recent Outpatient Visits              1 month ago Type 2 diabetes mellitus without complication, without long-term current use of insulin ([facility]    [address], Nghia Olivares MD    Telemedicine    3 months ago Upper respiratory tract infection, unspecified type    [facility], Patricia [facility]    Telemedicine    4 months ago Essential hypertension with goal blood pressure less than 140/90    [facility] Radha Nichols Cleotis Chiles, MD    Office Visit    4 months ago Radha Gillis [place] Ahmed [address]    Telemedicine    5 months ago [facility]    [address], Jose A Olivares APRN    Office Visit          Future Appointments         Provider Department Appt Notes    In 1 month Christen Romo MD [address], Patricia Olivares Diabetes follow up                  Future Appointments         Provider Department Appt Notes    In 1 month Christen Romo MD [address], Patricia Olivares Diabetes follow up          Recent Outpatient Visits              1 month ago Type 2 diabetes mellitus without complication, without long-term current use of insulin [facility])    [address], Nghia Olivares MD    Telemedicine    3 months ago Upper respiratory tract infection, unspecified type    [facility], Patricia Olivares [place], [facility]    Telemedicine    4 months ago Essential hypertension with goal blood pressure less than 140/90    Nghia Wright MD    Office Visit    4 months ago Radha Gillis [place], [place] Telemedicine    5 months ago [facility], [address], Ernst Castillo, APRN    Office Visit

## 2023-08-01 RX ORDER — FENOFIBRATE 145 MG/1
145 TABLET, COATED ORAL DAILY
Qty: 90 TABLET | Refills: 3 | Status: SHIPPED | OUTPATIENT
Start: 2023-08-01

## 2023-08-17 ENCOUNTER — PATIENT MESSAGE (OUTPATIENT)
Dept: INTERNAL MEDICINE CLINIC | Facility: CLINIC | Age: 38
End: 2023-08-17

## 2023-08-18 ENCOUNTER — TELEPHONE (OUTPATIENT)
Dept: INTERNAL MEDICINE CLINIC | Facility: CLINIC | Age: 38
End: 2023-08-18

## 2023-08-18 ENCOUNTER — LAB ENCOUNTER (OUTPATIENT)
Dept: LAB | Age: 38
End: 2023-08-18
Attending: INTERNAL MEDICINE
Payer: COMMERCIAL

## 2023-08-18 ENCOUNTER — OFFICE VISIT (OUTPATIENT)
Dept: INTERNAL MEDICINE CLINIC | Facility: CLINIC | Age: 38
End: 2023-08-18

## 2023-08-18 VITALS
SYSTOLIC BLOOD PRESSURE: 116 MMHG | BODY MASS INDEX: 37.33 KG/M2 | WEIGHT: 252 LBS | HEIGHT: 69 IN | HEART RATE: 98 BPM | OXYGEN SATURATION: 96 % | DIASTOLIC BLOOD PRESSURE: 70 MMHG

## 2023-08-18 DIAGNOSIS — Z01.89 ENCOUNTER FOR LABORATORY TEST: Primary | ICD-10-CM

## 2023-08-18 DIAGNOSIS — E11.9 TYPE 2 DIABETES MELLITUS WITHOUT COMPLICATION, WITHOUT LONG-TERM CURRENT USE OF INSULIN (HCC): ICD-10-CM

## 2023-08-18 LAB
ALBUMIN SERPL-MCNC: 4.1 G/DL (ref 3.4–5)
ALBUMIN/GLOB SERPL: 1 {RATIO} (ref 1–2)
ALP LIVER SERPL-CCNC: 51 U/L
ALT SERPL-CCNC: 51 U/L
ANION GAP SERPL CALC-SCNC: 8 MMOL/L (ref 0–18)
AST SERPL-CCNC: 39 U/L (ref 15–37)
BILIRUB SERPL-MCNC: 0.8 MG/DL (ref 0.1–2)
BUN BLD-MCNC: 12 MG/DL (ref 7–18)
BUN/CREAT SERPL: 14 (ref 10–20)
CALCIUM BLD-MCNC: 9.7 MG/DL (ref 8.5–10.1)
CHLORIDE SERPL-SCNC: 102 MMOL/L (ref 98–112)
CO2 SERPL-SCNC: 27 MMOL/L (ref 21–32)
CREAT BLD-MCNC: 0.86 MG/DL
DEPRECATED RDW RBC AUTO: 40.1 FL (ref 35.1–46.3)
EGFRCR SERPLBLD CKD-EPI 2021: 88 ML/MIN/1.73M2 (ref 60–?)
ERYTHROCYTE [DISTWIDTH] IN BLOOD BY AUTOMATED COUNT: 12.9 % (ref 11–15)
EST. AVERAGE GLUCOSE BLD GHB EST-MCNC: 197 MG/DL (ref 68–126)
FASTING STATUS PATIENT QL REPORTED: YES
GLOBULIN PLAS-MCNC: 4 G/DL (ref 2.8–4.4)
GLUCOSE BLD-MCNC: 160 MG/DL (ref 70–99)
HBA1C MFR BLD: 8.5 % (ref ?–5.7)
HCT VFR BLD AUTO: 42.4 %
HGB BLD-MCNC: 14.8 G/DL
MCH RBC QN AUTO: 30.1 PG (ref 26–34)
MCHC RBC AUTO-ENTMCNC: 34.9 G/DL (ref 31–37)
MCV RBC AUTO: 86.4 FL
OSMOLALITY SERPL CALC.SUM OF ELEC: 287 MOSM/KG (ref 275–295)
PLATELET # BLD AUTO: 277 10(3)UL (ref 150–450)
POTASSIUM SERPL-SCNC: 4 MMOL/L (ref 3.5–5.1)
PROT SERPL-MCNC: 8.1 G/DL (ref 6.4–8.2)
RBC # BLD AUTO: 4.91 X10(6)UL
SODIUM SERPL-SCNC: 137 MMOL/L (ref 136–145)
TSI SER-ACNC: 1.56 MIU/ML (ref 0.36–3.74)
WBC # BLD AUTO: 7.6 X10(3) UL (ref 4–11)

## 2023-08-18 PROCEDURE — 80053 COMPREHEN METABOLIC PANEL: CPT

## 2023-08-18 PROCEDURE — 36415 COLL VENOUS BLD VENIPUNCTURE: CPT

## 2023-08-18 PROCEDURE — 83036 HEMOGLOBIN GLYCOSYLATED A1C: CPT

## 2023-08-18 PROCEDURE — 85027 COMPLETE CBC AUTOMATED: CPT

## 2023-08-18 PROCEDURE — 84443 ASSAY THYROID STIM HORMONE: CPT

## 2023-08-18 RX ORDER — LAMOTRIGINE 25 MG/1
25 TABLET ORAL DAILY
COMMUNITY
Start: 2023-08-18

## 2023-08-18 NOTE — TELEPHONE ENCOUNTER
From: Coni Hodgson  To:  Skyla Knowles MD  Sent: 8/17/2023 6:13 AM CDT  Subject: Returning a call    Hello,   I tried to return a call from an [place] yesterday ([phone]) but nobody knew what the call was for lol I'll try again today but just in case that doesnt work out, I wanted to leave a post as another avenue of communication :Aniyah nicholas,  Luis F Hunt

## 2023-08-18 NOTE — TELEPHONE ENCOUNTER
Per the patient, she has an appointment to see her psychiatrist today and she is planning to do  all her overdue test that was ordered by  Dr Shayy Alberts,     States she left her work and she is requesting a letter to show to her employer. Informed that an appointment is needed to get a letter, in  office appointment scheduled today.       Future Appointments   Date Time Provider Chance Tang   8/18/2023  2:30 PM MINA Lee   9/27/2023 10:20 AM Shireen Hernandez MD [facility]

## 2023-08-22 ENCOUNTER — TELEMEDICINE (OUTPATIENT)
Dept: INTERNAL MEDICINE CLINIC | Facility: CLINIC | Age: 38
End: 2023-08-22

## 2023-08-22 DIAGNOSIS — J06.9 URI WITH COUGH AND CONGESTION: Primary | ICD-10-CM

## 2023-08-22 PROCEDURE — 99213 OFFICE O/P EST LOW 20 MIN: CPT | Performed by: NURSE PRACTITIONER

## 2023-08-27 ENCOUNTER — PATIENT MESSAGE (OUTPATIENT)
Dept: INTERNAL MEDICINE CLINIC | Facility: CLINIC | Age: 38
End: 2023-08-27

## 2023-09-14 ENCOUNTER — TELEPHONE (OUTPATIENT)
Dept: INTERNAL MEDICINE CLINIC | Facility: CLINIC | Age: 38
End: 2023-09-14

## 2023-09-20 ENCOUNTER — TELEPHONE (OUTPATIENT)
Dept: INTERNAL MEDICINE CLINIC | Facility: CLINIC | Age: 38
End: 2023-09-20

## 2023-09-20 NOTE — TELEPHONE ENCOUNTER
Patient was seen today requesting FMLA paper work to be completed     Copy emailed to Forms Dept    $25 fee collected     Please contact patient when paper work is completed will come to the [place] [facility] office to

## 2023-09-22 NOTE — TELEPHONE ENCOUNTER
Forms received inner mail and email, logged for processing. Valid auth with forms and added to chart.    Patient to  forms once completed

## 2023-09-29 NOTE — TELEPHONE ENCOUNTER
Pt calling to check the status of her forms. Let her know we received her forms on 9/22/23 and informed her of our current turnaround time. Type of Leave: Intermittent FMLA  Reason for Leave: Depression and anxiety   Start date of leave: 9/26/23 - 9/26/24  How much time needed?:  1-4 times per month lasting 1 day and 1 appointment per month lasting 1-4 hours per appointment.    Forms Due Date: 10/11/23 will ask for an extension   Was Fee and Turnaround info Given?: yes

## 2023-09-29 NOTE — TELEPHONE ENCOUNTER
Dr Tj Denson,     Pt is seeking intermittent FMLA for depression and anxiety. She is requesting 1-4 flare ups per month, lasting 1-24 hours per flare up and 1 appointment per month, lasting 1-4 hours per appointment. Start: 9/26/23, end: 9/26/24.  Do you support this request?    Thank Juliocesar Gomez

## 2023-10-05 NOTE — TELEPHONE ENCOUNTER
Dr. Kody Falcon       Please sign off on form if you agree to: FABIAN as discussed below   (Please place your signature on the first page only)    -From your Inbasket, Highlight the patient and click Chart   -Double click the 71/[id number] Forms Completion telephone encounter  -Scroll down to the Media section   -Click the blue Hyperlink: FABIAN Gómez [id number]  -Click Acknowledge located at the bottom right corner (if you do not see acknowledge, try maximizing your window)   -Drag the mouse into the blank space of the document and a + sign will appear. Left click to   electronically sign the document.      Thank you,  Vicky Ann

## 2023-10-09 NOTE — TELEPHONE ENCOUNTER
FMLA forms completed. [place] message snet. LM for patient. On Auth / DEANGELO pt to  when completed.

## 2023-10-17 ENCOUNTER — NURSE TRIAGE (OUTPATIENT)
Dept: INTERNAL MEDICINE CLINIC | Facility: CLINIC | Age: 38
End: 2023-10-17

## 2023-10-17 NOTE — TELEPHONE ENCOUNTER
Action Requested: Summary for Provider     []  Critical Lab, Recommendations Needed  [] Need Additional Advice  [x]   FYI    []   Need Orders  [] Need Medications Sent to Pharmacy  []  Other     SUMMARY: Patient states she was seen 2 days ago at [address] after tripping on the uneven driveway and falling. Patient states her ribs are hurting when she takes a deep breath. More so now than when it happened. She is taking Tylenol #3 and ibuprofen as directed. Patient denies shortness of breath and the pain is only when she takes deep breath. Patient will bring x-ray from [facility] or have results faxed to the office. Patient scheduled for an appointment for tomorrow at 12pm. Patient verbalized understanding to call back if any worsening symptoms.     Reason for call: Trauma (Rib pain s/p fall 2 days ago)  Onset: 2 days ago  Reason for Disposition   Patient wants to be seen    Protocols used: Chest Injury-A-OH

## 2023-10-18 ENCOUNTER — MED REC SCAN ONLY (OUTPATIENT)
Dept: INTERNAL MEDICINE CLINIC | Facility: CLINIC | Age: 38
End: 2023-10-18

## 2023-10-18 ENCOUNTER — OFFICE VISIT (OUTPATIENT)
Dept: INTERNAL MEDICINE CLINIC | Facility: CLINIC | Age: 38
End: 2023-10-18

## 2023-10-18 VITALS
SYSTOLIC BLOOD PRESSURE: 139 MMHG | OXYGEN SATURATION: 97 % | HEART RATE: 77 BPM | BODY MASS INDEX: 37.47 KG/M2 | HEIGHT: 69 IN | WEIGHT: 253 LBS | DIASTOLIC BLOOD PRESSURE: 91 MMHG
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
## 2023-10-18 DIAGNOSIS — R07.81 RIB PAIN ON RIGHT SIDE: Primary | ICD-10-CM

## 2023-10-18 PROCEDURE — 3075F SYST BP GE 130 - 139MM HG: CPT

## 2023-10-18 PROCEDURE — 3008F BODY MASS INDEX DOCD: CPT

## 2023-10-18 PROCEDURE — 99214 OFFICE O/P EST MOD 30 MIN: CPT

## 2023-10-18 PROCEDURE — 3080F DIAST BP >= 90 MM HG: CPT

## 2023-10-18 RX ORDER — IBUPROFEN 800 MG/1
800 TABLET ORAL 3 TIMES DAILY
COMMUNITY
Start: 2023-10-12 | End: 2023-10-18

## 2023-10-18 RX ORDER — IBUPROFEN 800 MG/1
800 TABLET ORAL 2 TIMES DAILY PRN
Qty: 42 TABLET | Refills: 0 | Status: SHIPPED | OUTPATIENT
Start: 2023-10-18

## 2023-10-18 RX ORDER — ACETAMINOPHEN AND CODEINE PHOSPHATE 300; 30 MG/1; MG/1
1 TABLET ORAL
COMMUNITY
Start: 2023-10-12

## 2023-10-18 NOTE — PATIENT INSTRUCTIONS
Lidocaine patches or combo lidocaine- menthol   Ibuprofen 800mg twice a day WITH FOOD  Monitor for signs of bleeding     Deep breathe and cough after taking medication   Let us know in 1 week how you are feeling

## 2023-10-27 ENCOUNTER — TELEMEDICINE (OUTPATIENT)
Dept: INTERNAL MEDICINE CLINIC | Facility: CLINIC | Age: 38
End: 2023-10-27

## 2023-10-27 DIAGNOSIS — R07.81 RIB PAIN ON RIGHT SIDE: Primary | ICD-10-CM

## 2023-10-27 PROCEDURE — 99212 OFFICE O/P EST SF 10 MIN: CPT

## 2023-10-27 NOTE — PROGRESS NOTES
This is a telemedicine visit with live, interactive video and audio. Patient understands and accepts financial responsibility for any deductible, co-insurance and/or co-pays associated with this service. SUBJECTIVE  F/u on rib pain after fall. She is feeling much better, able to take deep breaths without pain. 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 She then took the rest of the week off and would like to return tomorrow. She only has pain now when she activates the right side in a major way. She would also like a week of light duty since it is still painful to crouch or pick things up.      She returned to work Friday and then called off Sat-Tues and today- so missed 5 days      Stopped ibuprofen a few days ago due to blood thinning interaction with sertraline, is doing well without pain medication    HISTORY:  Past Medical History:   Diagnosis Date    ADHD     Anxiety     Cancer ([facility]     endometrial, s/p robotic hyst/BSO    Depression     Diabetes ([facility]     Hemorrhoid     Morbid obesity with BMI of 40.0-44.9, adult ([facility]     Obesity, unspecified     Pre-diabetes     Unspecified essential hypertension       Past Surgical History:   Procedure Laterality Date    HYSTERECTOMY  2013    TONSILLECTOMY        Family History   Problem Relation Age of Onset    Diabetes Mother     Hypertension Mother     Lipids Mother         Hyperlipidemia    Thyroid Disorder Mother         Hypothyroidism    Pulmonary Disease Mother         COPD    Anemia Mother     Asthma Mother     Depression Mother     Obesity Mother     Breast Cancer Maternal Grandmother     Depression Maternal Grandmother     Breast Cancer Maternal Aunt     Breast Cancer Maternal Cousin Female     Cancer Father         kidney cancer    Diabetes Maternal Grandfather Hypertension Maternal Grandfather     Obesity Maternal Grandfather     Cancer Paternal Grandfather     Dementia Paternal Grandmother     Asthma Brother     Obesity Brother       Social History     Socioeconomic History    Marital status:    Tobacco Use    Smoking status: Never    Smokeless tobacco: Never   Vaping Use    Vaping Use: Never used   Substance and Sexual Activity    Alcohol use: Not Currently     Comment: rarely    Drug use: No   Other Topics Concern    Caffeine Concern Yes     Comment: Chocolate daily; No Known Allergies   Current Outpatient Medications   Medication Sig Dispense Refill    acetaminophen-codeine 300-30 MG Oral Tab Take 1 tablet by mouth every 4 to 6 hours as needed for Pain. ibuprofen 800 MG Oral Tab Take 1 tablet (800 mg total) by mouth 2 (two) times daily as needed for Pain. 42 tablet 0    lamoTRIgine 25 MG Oral Tab Take 1 tablet (25 mg total) by mouth daily. (Patient not taking: Reported on 10/18/2023)      fenofibrate 145 MG Oral Tab Take 1 tablet (145 mg total) by mouth daily. 90 tablet 3    lisinopril-hydroCHLOROthiazide 20-25 MG Oral Tab TAKE ONE TABLET BY MOUTH ONE TIME DAILY. 90 tablet 3    sertraline 100 MG Oral Tab Take 1 tablet (100 mg total) by mouth daily. 90 tablet 3    metFORMIN 1000 MG Oral Tab Take 1 tablet (1,000 mg total) by mouth 2 (two) times daily with meals. 180 tablet 3    Cholecalciferol (VITAMIN D) 50 MCG (2000 UT) Oral Tab Take by oral route 2000 units daily per 30 tablet 0    OZEMPIC, 0.25 OR 0.5 MG/DOSE, 2 MG/1.5ML Subcutaneous Solution Pen-injector Inject 0.25 mg into the skin every 7 days for 28 days.  1.5 mL 0    FreeStyle Lancets Does not apply Misc Test blood sugar daily as directed 100 each 3    Glucose Blood (FREESTYLE TEST) In Vitro Strip Test blood sugar daily as directed 100 strip 3    FreeStyle System Does not apply Kit Test blood sugar daily as directed 1 each 0    OMEPRAZOLE 20 MG Oral Capsule Delayed Release TAKE ONE CAPSULE BY MOUTH IN THE MORNING BEFORE BREAKFAST 90 capsule 0       OBJECTIVE  Physical Exam:   alert, appears stated age, cooperative, and no distress    ASSESSMENT & PLAN  Diagnoses and all orders for this visit:    Rib pain on right side    Greatly improved, taking no pain medications - will return to work tomorrow  Work note sent    Time spent in video: 5m 34s  Total time spent: 10min    MINA Spann

## 2023-10-31 ENCOUNTER — TELEMEDICINE (OUTPATIENT)
Dept: INTERNAL MEDICINE CLINIC | Facility: CLINIC | Age: 38
End: 2023-10-31

## 2023-10-31 DIAGNOSIS — E11.9 TYPE 2 DIABETES MELLITUS WITHOUT COMPLICATION, WITHOUT LONG-TERM CURRENT USE OF INSULIN (HCC): ICD-10-CM

## 2023-10-31 PROCEDURE — 99214 OFFICE O/P EST MOD 30 MIN: CPT | Performed by: INTERNAL MEDICINE

## 2023-10-31 NOTE — PROGRESS NOTES
Patient ID: Wilmer Martinez is a 34year old female. No chief complaint on file. HISTORY OF PRESENT ILLNESS:   Patient presents for above. This visit is conducted using Telemedicine with live, interactive video and audio. C/c follow up  C/o bl sugars in the 200s   her ribs are better   Not taking ozempic bc it was supposedto be refilled by the bariatrics clinic - off x 4 mns   Seeing psychiatrist -stopped lamotrigine - meds being adjusted       Review of Systems   MEDICAL HISTORY:     Past Medical History:   Diagnosis Date    ADHD     Anxiety     Cancer ([facility] [address]     endometrial, s/p robotic hyst/BSO    Depression     Diabetes ([facility]     Hemorrhoid     Morbid obesity with BMI of 40.0-44.9, adult ([facility]     Obesity, unspecified     Pre-diabetes     Unspecified essential hypertension        Past Surgical History:   Procedure Laterality Date    HYSTERECTOMY  2013    TONSILLECTOMY           Current Outpatient Medications:     semaglutide 2 MG/3ML Subcutaneous Solution Pen-injector, Inject 0.25 mg into the skin once a week., Disp: 3 each, Rfl: 3    fenofibrate 145 MG Oral Tab, Take 1 tablet (145 mg total) by mouth daily. , Disp: 90 tablet, Rfl: 3    lisinopril-hydroCHLOROthiazide 20-25 MG Oral Tab, TAKE ONE TABLET BY MOUTH ONE TIME DAILY. , Disp: 90 tablet, Rfl: 3    sertraline 100 MG Oral Tab, Take 1 tablet (100 mg total) by mouth daily. , Disp: 90 tablet, Rfl: 3    metFORMIN 1000 MG Oral Tab, Take 1 tablet (1,000 mg total) by mouth 2 (two) times daily with meals. , Disp: 180 tablet, Rfl: 3    Cholecalciferol (VITAMIN D) 50 MCG (2000 UT) Oral Tab, Take by oral route 2000 units daily per, Disp: 30 tablet, Rfl: 0    FreeStyle Lancets Does not apply Misc, Test blood sugar daily as directed, Disp: 100 each, Rfl: 3    Glucose Blood (FREESTYLE TEST) In Vitro Strip, Test blood sugar daily as directed, Disp: 100 strip, Rfl: 3    FreeStyle System Does not apply Kit, Test blood sugar daily as directed, Disp: 1 each, Rfl: 0    OMEPRAZOLE 20 MG Oral Capsule Delayed Release, TAKE ONE CAPSULE BY MOUTH IN THE MORNING BEFORE BREAKFAST, Disp: 90 capsule, Rfl: 0    Allergies:No Known Allergies    Social History    Socioeconomic History      Marital status:       Spouse name: Not on file      Number of children: Not on file      Years of education: Not on file      Highest education level: Not on file    Occupational History      Not on file    Tobacco Use      Smoking status: Never      Smokeless tobacco: Never    Vaping Use      Vaping Use: Never used    Substance and Sexual Activity      Alcohol use: Not Currently        Comment: rarely      Drug use: No      Sexual activity: Not on file    Other Topics      Concerns:         Service: Not Asked        Blood Transfusions: Not Asked        Caffeine Concern: Yes          Chocolate daily; Occupational Exposure: Not Asked        Hobby Hazards: Not Asked        Sleep Concern: Not Asked        Stress Concern: Not Asked        Weight Concern: Not Asked        Special Diet: Not Asked        Back Care: Not Asked        Exercise: Not Asked        Bike Helmet: Not Asked        Seat Belt: Not Asked        Self-Exams: Not Asked    Social History Narrative      Not on file    Social Determinants of Health  Financial Resource Strain: Not on file  Food Insecurity: Not on file  Transportation Needs: Not on file  Physical Activity: Not on file  Stress: Not on file  Social Connections: Not on file  Housing Stability: Not on file    PHYSICAL EXAM:   Unable to perform vitals or do physical exam as this is a virtual video visit. Patient appears alert and oriented x 3 , NAD   Pt speaking complete sentences without any conversational dyspnea or respiratory distress  No coughing    ASSESSMENT/PLAN:   1. Type 2 diabetes mellitus without complication, without long-term current use of insulin (HCC)  - semaglutide 2 MG/3ML Subcutaneous Solution Pen-injector;  Inject 0.25 mg into the skin once a week.  Dispense: 3 each; Refill: 3  - ENDOCRINOLOGY - INTERNAL  Aic 8.5 on 8/2023 -due next mn   Saw the eye dr - will have them fax the results or birng it in   Still has to give her urine sample  Will come in to the office for her annual physical    Refilled ozempic and will refer to endo     No follow-ups on file. Time spent on encounter  11 minutes   Video time 11 minutes   Documentation time 11 minutes     Abraham Johnson understands video evaluation is not a substitute for face-to-face examination or emergency care. Patient advised to go to ER or call 911 for worsening symptoms or acute distress. Telehealth outside of [address] Verbal Consent   I conducted a telehealth visit with Abraham Marieashleigh today, 10/31/23, which was completed using two-way, real-time interactive audio and video communication. This has been done in good kathi to provide continuity of care in the best interest of the provider-patient relationship, due to the COVID -19 public health crisis/national emergency where restrictions of face-to-face office visits are ongoing. Every conscious effort was taken to allow for sufficient and adequate time to complete the visit. The patient was made aware of the limitations of the telehealth visit, including treatment limitations as no physical exam could be performed. The patient was advised to call 911 or to go to the ER in case there was an emergency. The patient was also advised of the potential privacy & security concerns related to the telehealth platform. The patient was made aware of where to find [facility] notice of privacy practices, telehealth consent form and other related consent forms and documents. which are located on the [facility] website. The patient verbally agreed to telehealth consent form, related consents and the risks discussed. Lastly, the patient confirmed that they were in [place].    Included in this visit, time may have been spent reviewing labs, medications, radiology tests and decision making. Appropriate medical decision-making and tests are ordered as detailed in the plan of care above. Coding/billing information is submitted for this visit based on complexity of care and/or time spent for the visit. This note was prepared using BioWizard voice recognition dictation software. As a result errors may occur. When identified these errors have been corrected.  While every attempt is made to correct errors during dictation discrepancies may still exist.    Josiah Wade MD  10/31/2023

## 2023-11-08 ENCOUNTER — TELEMEDICINE (OUTPATIENT)
Dept: INTERNAL MEDICINE CLINIC | Facility: CLINIC | Age: 38
End: 2023-11-08
Payer: COMMERCIAL

## 2023-11-08 DIAGNOSIS — R07.81 RIB PAIN ON RIGHT SIDE: Primary | ICD-10-CM

## 2023-11-08 PROCEDURE — 99212 OFFICE O/P EST SF 10 MIN: CPT

## 2023-11-08 NOTE — PROGRESS NOTES
This is a telemedicine visit with live, interactive video and audio. Patient understands and accepts financial responsibility for any deductible, co-insurance and/or co-pays associated with this service. SUBJECTIVE    F/u on rib pain. I last saw her 10/27 and she stated she was ready to return to work on light duty. States she attempted to return to work but was not placed on the light duty she requested, so she overtaxed herself and began having rib pain again due to using her right side. She was sent home from work and stayed home for a few days. She went to a [facility] urgent care but was told they could not provide her with the work note she requested. When she is at home she does not have pain except for if she hiccups or sneezes. If she works the right side too much she gets a dull ache. Tylenol helps with the pain but she did not have any available at work. Requesting a note for days missed and additional light duty.     Also woke up feeling sick today with a stuffy nose and congestion    HISTORY:  Past Medical History:   Diagnosis Date    ADHD     Anxiety     Cancer ([facility] [address]     endometrial, s/p robotic hyst/BSO    Depression     Diabetes ([facility] [address]     Hemorrhoid     Morbid obesity with BMI of 40.0-44.9, adult ([facility] [address]     Obesity, unspecified     Pre-diabetes     Unspecified essential hypertension       Past Surgical History:   Procedure Laterality Date    HYSTERECTOMY  2013    TONSILLECTOMY        Family History   Problem Relation Age of Onset    Diabetes Mother     Hypertension Mother     Lipids Mother         Hyperlipidemia    Thyroid Disorder Mother         Hypothyroidism    Pulmonary Disease Mother         COPD    Anemia Mother     Asthma Mother     Depression Mother     Obesity Mother     Breast Cancer Maternal Grandmother     Depression Maternal Grandmother     Breast Cancer Maternal Aunt     Breast Cancer Maternal Cousin Female     Cancer Father         kidney cancer    Diabetes Maternal Grandfather Hypertension Maternal Grandfather     Obesity Maternal Grandfather     Cancer Paternal Grandfather     Dementia Paternal Grandmother     Asthma Brother     Obesity Brother       Social History     Socioeconomic History    Marital status:    Tobacco Use    Smoking status: Never    Smokeless tobacco: Never   Vaping Use    Vaping Use: Never used   Substance and Sexual Activity    Alcohol use: Not Currently     Comment: rarely    Drug use: No   Other Topics Concern    Caffeine Concern Yes     Comment: Chocolate daily; No Known Allergies   Current Outpatient Medications   Medication Sig Dispense Refill    semaglutide 2 MG/3ML Subcutaneous Solution Pen-injector Inject 0.25 mg into the skin once a week. 3 each 3    fenofibrate 145 MG Oral Tab Take 1 tablet (145 mg total) by mouth daily. 90 tablet 3    lisinopril-hydroCHLOROthiazide 20-25 MG Oral Tab TAKE ONE TABLET BY MOUTH ONE TIME DAILY. 90 tablet 3    sertraline 100 MG Oral Tab Take 1 tablet (100 mg total) by mouth daily. 90 tablet 3    metFORMIN 1000 MG Oral Tab Take 1 tablet (1,000 mg total) by mouth 2 (two) times daily with meals.  180 tablet 3    Cholecalciferol (VITAMIN D) 50 MCG (2000 UT) Oral Tab Take by oral route 2000 units daily per 30 tablet 0    FreeStyle Lancets Does not apply Misc Test blood sugar daily as directed 100 each 3    Glucose Blood (FREESTYLE TEST) In Vitro Strip Test blood sugar daily as directed 100 strip 3    FreeStyle System Does not apply Kit Test blood sugar daily as directed 1 each 0    OMEPRAZOLE 20 MG Oral Capsule Delayed Release TAKE ONE CAPSULE BY MOUTH IN THE MORNING BEFORE BREAKFAST 90 capsule 0     OBJECTIVE  Physical Exam:   alert, appears stated age, and cooperative, Speaking in full sentences comfortably, and Normal work of breathing  Mildly congested voice    ASSESSMENT & PLAN  Diagnoses and all orders for this visit:    Rib pain on right side    Advised her this is the last note I will be able to write as her condition should be improved by now. Advised to take tylenol prior to work to reduce pain. Recommended OTC cough medicine if she starts to cough due to her URI as this will exacerbate her rib pain.     Time spent in video: 8m 12s  Total time spent: 15min    MINA Avelar

## 2023-11-09 ENCOUNTER — HOSPITAL ENCOUNTER (EMERGENCY)
Facility: HOSPITAL | Age: 38
Discharge: HOME | End: 2023-11-09
Attending: EMERGENCY MEDICINE
Payer: COMMERCIAL

## 2023-11-09 ENCOUNTER — HOSPITAL ENCOUNTER (OUTPATIENT)
Age: 38
Discharge: ANOTHER HOSPITAL | End: 2023-11-09
Payer: COMMERCIAL

## 2023-11-09 ENCOUNTER — APPOINTMENT (OUTPATIENT)
Dept: CT IMAGING | Facility: HOSPITAL | Age: 38
End: 2023-11-09
Attending: EMERGENCY MEDICINE
Payer: COMMERCIAL

## 2023-11-09 VITALS
OXYGEN SATURATION: 97 % | RESPIRATION RATE: 18 BRPM | SYSTOLIC BLOOD PRESSURE: 143 MMHG | TEMPERATURE: 99 F | DIASTOLIC BLOOD PRESSURE: 86 MMHG | HEART RATE: 109 BPM

## 2023-11-09 VITALS
WEIGHT: 255 LBS | OXYGEN SATURATION: 99 % | HEART RATE: 95 BPM | BODY MASS INDEX: 38.65 KG/M2 | RESPIRATION RATE: 16 BRPM | DIASTOLIC BLOOD PRESSURE: 85 MMHG | TEMPERATURE: 99 F | HEIGHT: 68 IN | SYSTOLIC BLOOD PRESSURE: 134 MMHG

## 2023-11-09 DIAGNOSIS — R10.9 ACUTE LEFT FLANK PAIN: Primary | ICD-10-CM

## 2023-11-09 DIAGNOSIS — R31.29 OTHER MICROSCOPIC HEMATURIA: ICD-10-CM

## 2023-11-09 DIAGNOSIS — R30.0 DYSURIA: Primary | ICD-10-CM

## 2023-11-09 DIAGNOSIS — R10.814 ABDOMINAL TENDERNESS OF LEFT LOWER QUADRANT, REBOUND TENDERNESS PRESENCE NOT SPECIFIED: ICD-10-CM

## 2023-11-09 LAB
B-HCG UR QL: NEGATIVE
BILIRUB UR QL STRIP: NEGATIVE
BILIRUB UR QL: NEGATIVE
GLUCOSE BLDC GLUCOMTR-MCNC: 341 MG/DL (ref 70–99)
GLUCOSE UR STRIP-MCNC: 500 MG/DL
GLUCOSE UR-MCNC: 500 MG/DL
HYALINE CASTS #/AREA URNS AUTO: PRESENT /LPF
KETONES UR STRIP-MCNC: NEGATIVE MG/DL
KETONES UR-MCNC: NEGATIVE MG/DL
LEUKOCYTE ESTERASE UR QL STRIP.AUTO: NEGATIVE
LEUKOCYTE ESTERASE UR QL STRIP: NEGATIVE
NITRITE UR QL STRIP.AUTO: NEGATIVE
NITRITE UR QL STRIP: NEGATIVE
PH UR STRIP: 5.5 [PH]
PH UR: 5 [PH] (ref 5–8)
PROT UR-MCNC: 30 MG/DL
RBC #/AREA URNS AUTO: >10 /HPF
SP GR UR STRIP: 1.02 (ref 1–1.03)
SP GR UR STRIP: >=1.03
UROBILINOGEN UR STRIP-ACNC: <2 MG/DL
UROBILINOGEN UR STRIP-ACNC: NORMAL

## 2023-11-09 PROCEDURE — 81001 URINALYSIS AUTO W/SCOPE: CPT | Performed by: EMERGENCY MEDICINE

## 2023-11-09 PROCEDURE — 99284 EMERGENCY DEPT VISIT MOD MDM: CPT

## 2023-11-09 PROCEDURE — 87086 URINE CULTURE/COLONY COUNT: CPT | Performed by: NURSE PRACTITIONER

## 2023-11-09 PROCEDURE — 74176 CT ABD & PELVIS W/O CONTRAST: CPT | Performed by: EMERGENCY MEDICINE

## 2023-11-09 PROCEDURE — 99285 EMERGENCY DEPT VISIT HI MDM: CPT

## 2023-11-09 PROCEDURE — 81025 URINE PREGNANCY TEST: CPT

## 2023-11-09 RX ORDER — KETOROLAC TROMETHAMINE 10 MG/1
10 TABLET, FILM COATED ORAL EVERY 6 HOURS PRN
Qty: 20 TABLET | Refills: 0 | Status: SHIPPED | OUTPATIENT
Start: 2023-11-09 | End: 2023-11-14

## 2023-11-10 NOTE — ED INITIAL ASSESSMENT (HPI)
Pt reports urinary frequency/urgency, hematuria, LUQ abdominal pain beginning in middle of night. Denies fevers.

## 2023-11-10 NOTE — ED INITIAL ASSESSMENT (HPI)
Pt presents to the ED with c/o LLQ abdominal pain since this morning. +vaginal pain +urinary frequency +diarrhea.

## 2023-11-14 ENCOUNTER — LAB ENCOUNTER (OUTPATIENT)
Dept: LAB | Age: 38
End: 2023-11-14
Attending: NURSE PRACTITIONER
Payer: COMMERCIAL

## 2023-11-14 ENCOUNTER — NURSE TRIAGE (OUTPATIENT)
Dept: INTERNAL MEDICINE CLINIC | Facility: CLINIC | Age: 38
End: 2023-11-14

## 2023-11-14 ENCOUNTER — OFFICE VISIT (OUTPATIENT)
Dept: INTERNAL MEDICINE CLINIC | Facility: CLINIC | Age: 38
End: 2023-11-14

## 2023-11-14 ENCOUNTER — TELEPHONE (OUTPATIENT)
Dept: ENDOCRINOLOGY CLINIC | Facility: CLINIC | Age: 38
End: 2023-11-14

## 2023-11-14 VITALS
RESPIRATION RATE: 16 BRPM | WEIGHT: 251 LBS | HEART RATE: 89 BPM | DIASTOLIC BLOOD PRESSURE: 83 MMHG | BODY MASS INDEX: 38.04 KG/M2 | HEIGHT: 68 IN | SYSTOLIC BLOOD PRESSURE: 121 MMHG

## 2023-11-14 DIAGNOSIS — R42 DIZZINESS: Primary | ICD-10-CM

## 2023-11-14 DIAGNOSIS — R42 DIZZINESS: ICD-10-CM

## 2023-11-14 DIAGNOSIS — I10 ESSENTIAL HYPERTENSION WITH GOAL BLOOD PRESSURE LESS THAN 140/90: ICD-10-CM

## 2023-11-14 DIAGNOSIS — E11.9 TYPE 2 DIABETES MELLITUS WITHOUT COMPLICATION, WITHOUT LONG-TERM CURRENT USE OF INSULIN (HCC): ICD-10-CM

## 2023-11-14 LAB
ANION GAP SERPL CALC-SCNC: 11 MMOL/L (ref 0–18)
BUN BLD-MCNC: 14 MG/DL (ref 9–23)
BUN/CREAT SERPL: 18.4 (ref 10–20)
CALCIUM BLD-MCNC: 9.8 MG/DL (ref 8.7–10.4)
CHLORIDE SERPL-SCNC: 99 MMOL/L (ref 98–112)
CO2 SERPL-SCNC: 24 MMOL/L (ref 21–32)
CREAT BLD-MCNC: 0.76 MG/DL
DEPRECATED RDW RBC AUTO: 39.4 FL (ref 35.1–46.3)
EGFRCR SERPLBLD CKD-EPI 2021: 102 ML/MIN/1.73M2 (ref 60–?)
ERYTHROCYTE [DISTWIDTH] IN BLOOD BY AUTOMATED COUNT: 12.8 % (ref 11–15)
FASTING STATUS PATIENT QL REPORTED: NO
GLUCOSE BLD-MCNC: 215 MG/DL (ref 70–99)
HCT VFR BLD AUTO: 39.3 %
HGB BLD-MCNC: 13.5 G/DL
MCH RBC QN AUTO: 29.1 PG (ref 26–34)
MCHC RBC AUTO-ENTMCNC: 34.4 G/DL (ref 31–37)
MCV RBC AUTO: 84.7 FL
OSMOLALITY SERPL CALC.SUM OF ELEC: 285 MOSM/KG (ref 275–295)
PLATELET # BLD AUTO: 265 10(3)UL (ref 150–450)
POTASSIUM SERPL-SCNC: 3.6 MMOL/L (ref 3.5–5.1)
RBC # BLD AUTO: 4.64 X10(6)UL
SODIUM SERPL-SCNC: 134 MMOL/L (ref 136–145)
WBC # BLD AUTO: 7.4 X10(3) UL (ref 4–11)

## 2023-11-14 PROCEDURE — 3074F SYST BP LT 130 MM HG: CPT | Performed by: NURSE PRACTITIONER

## 2023-11-14 PROCEDURE — 80048 BASIC METABOLIC PNL TOTAL CA: CPT

## 2023-11-14 PROCEDURE — 99214 OFFICE O/P EST MOD 30 MIN: CPT | Performed by: NURSE PRACTITIONER

## 2023-11-14 PROCEDURE — 3008F BODY MASS INDEX DOCD: CPT | Performed by: NURSE PRACTITIONER

## 2023-11-14 PROCEDURE — 3079F DIAST BP 80-89 MM HG: CPT | Performed by: NURSE PRACTITIONER

## 2023-11-14 PROCEDURE — 36415 COLL VENOUS BLD VENIPUNCTURE: CPT

## 2023-11-14 PROCEDURE — 85027 COMPLETE CBC AUTOMATED: CPT

## 2023-11-14 RX ORDER — DULOXETIN HYDROCHLORIDE 30 MG/1
30 CAPSULE, DELAYED RELEASE ORAL DAILY
COMMUNITY
Start: 2023-11-04

## 2023-11-14 NOTE — TELEPHONE ENCOUNTER
Noted and agree but her bl sugars are too high   I would suggest she take the ozempic   Placed endo referral - first avail -salvatore or Harjinder Neville to them and also will cc NP kelly Carmona endo team- would you or anyone in the endo clinic be able to see this pt sooner for high blood sugars and education Babak Monreal

## 2023-11-14 NOTE — TELEPHONE ENCOUNTER
Per TE 11/14/23, Called and spoke to patient, Consult booked with Vita Campbell on 12/6/23. [facility] sent to patient with our address as requested by patient.

## 2023-11-14 NOTE — TELEPHONE ENCOUNTER
Action Requested: Summary for Provider     []  Critical Lab, Recommendations Needed  [x] Need Additional Advice  []   FYI    []   Need Orders  [] Need Medications Sent to Pharmacy  []  Other     SUMMARY: Patient stated that since 11/12/2023 has been feeling lightheaded, off balance, nauseated. Just getting over a cold-symptoms pretty much gone. Does not feel she is going to pass out. States feels like she is walking more toward the left. Patient did not take her metformin or any of her other medications yesterday, since woke up late and forgot. Took blood sugar today 40 minutes after eating 3 egg whites and toast and took the metformin. Blood sugar was in the 280's, which is the lowest it has been in a while. Patient not sure if it is just her body reacting to her blood sugar being so high before. Asked patient if she was having diarrhea, which she stated she did have. Drinking water. Has not started the ozempic since wanted to check with her other doctor to make sure no interactions with any of the other medications she is taking. Patient does take lisinopril hydrochlorothiazide 20/25mg daily. Blood pressure currently is  145/96 pulse 89. No other symptoms. No appointments available with Dr Ulises Isbell today. Appointment made for today at 3pm with Nita Flores at [place]. Advised patient to continue to drink fluids. Can try gatorade/pedialyte but water it down so not increasing sugar too much. Patient agreed.     Reason for call: Dizziness (Lightheaded, off balance, nausea)  Onset: Nov 12, 2023    Reason for Disposition   Taking a medicine that could cause dizziness (e.g., blood pressure medications, diuretics)    Protocols used: Dizziness-A-OH

## 2023-11-14 NOTE — TELEPHONE ENCOUNTER
Endo staff: please call to schedule apt with [facility]. She has soonest available apt. Ok to use res 24 apt slot. Thank you!

## 2023-11-14 NOTE — TELEPHONE ENCOUNTER
Noted, message was already routed to Inna and MINA Jo.   Future Appointments   Date Time Provider Chance Tang   11/14/2023  3:00 PM MINA Munoz [facility]   11/20/2023  3:20 PM Marlon Hernandez MD [facility]   11/22/2023  2:20 PM LMB RUFUS 1 LMB RUFUS [facility] [place]

## 2023-11-15 NOTE — TELEPHONE ENCOUNTER
Routed to Endo staff for assistance, thanks.         Future Appointments   Date Time Provider Chance Tang   11/20/2023  3:20 PM Gudelia Castanon MD [facility]   11/22/2023  2:20 PM LMB [place] [id number] [facility]   12/6/2023 11:45 AM Erika Morning, APRN [facility]

## 2023-11-20 ENCOUNTER — TELEMEDICINE (OUTPATIENT)
Dept: INTERNAL MEDICINE CLINIC | Facility: CLINIC | Age: 38
End: 2023-11-20

## 2023-11-20 DIAGNOSIS — G89.29 CHRONIC PAIN OF RIGHT KNEE: Primary | ICD-10-CM

## 2023-11-20 DIAGNOSIS — M25.561 CHRONIC PAIN OF RIGHT KNEE: Primary | ICD-10-CM

## 2023-11-20 NOTE — PROGRESS NOTES
Patient ID: Elsa Lucas is a 34year old female. No chief complaint on file. HISTORY OF PRESENT ILLNESS:   Patient presents for above. This visit is conducted using Telemedicine with live, interactive video and audio. C/c f/u  C/o  will start  ozempic yet  -spoke to psychiatrist and was given the ok   No more dizziness or lightheadedness -- might have come off of the sertraline too fast   Had a fall over a mn ago and rib is doing better but when carrying gorceries on that side there is some discomfort but knee still hurts like fire , feels like burning -- right knee on the lat aspect     Good news   Therapy is great and psychiatrist and cymbalta is helping and she is watching what she eats and exercising , lost wt     Review of Systems   MEDICAL HISTORY:     Past Medical History:   Diagnosis Date    ADHD     Anxiety     Cancer ([facility] [address]     endometrial, s/p robotic hyst/BSO    Depression     Diabetes ([facility] [address]     Hemorrhoid     Morbid obesity with BMI of 40.0-44.9, adult ([facility] [address]     Obesity, unspecified     Pre-diabetes     Unspecified essential hypertension        Past Surgical History:   Procedure Laterality Date    HYSTERECTOMY  2013    TONSILLECTOMY           Current Outpatient Medications:     DULoxetine 30 MG Oral Cap DR Particles, Take 1 capsule (30 mg total) by mouth daily. , Disp: , Rfl:     semaglutide 2 MG/3ML Subcutaneous Solution Pen-injector, Inject 0.25 mg into the skin once a week. (Patient not taking: Reported on 11/14/2023), Disp: 3 each, Rfl: 3    fenofibrate 145 MG Oral Tab, Take 1 tablet (145 mg total) by mouth daily. , Disp: 90 tablet, Rfl: 3    lisinopril-hydroCHLOROthiazide 20-25 MG Oral Tab, TAKE ONE TABLET BY MOUTH ONE TIME DAILY. , Disp: 90 tablet, Rfl: 3    metFORMIN 1000 MG Oral Tab, Take 1 tablet (1,000 mg total) by mouth 2 (two) times daily with meals. , Disp: 180 tablet, Rfl: 3    Cholecalciferol (VITAMIN D) 50 MCG (2000 UT) Oral Tab, Take by oral route 2000 units daily per, Disp: 30 tablet, Rfl: 0    FreeStyle Lancets Does not apply Misc, Test blood sugar daily as directed, Disp: 100 each, Rfl: 3    Glucose Blood (FREESTYLE TEST) In Vitro Strip, Test blood sugar daily as directed, Disp: 100 strip, Rfl: 3    FreeStyle System Does not apply Kit, Test blood sugar daily as directed, Disp: 1 each, Rfl: 0    OMEPRAZOLE 20 MG Oral Capsule Delayed Release, TAKE ONE CAPSULE BY MOUTH IN THE MORNING BEFORE BREAKFAST, Disp: 90 capsule, Rfl: 0    Allergies:No Known Allergies    Social History     Socioeconomic History    Marital status:      Spouse name: Not on file    Number of children: Not on file    Years of education: Not on file    Highest education level: Not on file   Occupational History    Not on file   Tobacco Use    Smoking status: Never    Smokeless tobacco: Never   Vaping Use    Vaping Use: Never used   Substance and Sexual Activity    Alcohol use: Not Currently     Comment: rarely    Drug use: No    Sexual activity: Not on file   Other Topics Concern     Service Not Asked    Blood Transfusions Not Asked    Caffeine Concern Yes     Comment: Chocolate daily; Occupational Exposure Not Asked    [address] Hazards Not Asked    Sleep Concern Not Asked    Stress Concern Not Asked    Weight Concern Not Asked    Special Diet Not Asked    Back Care Not Asked    Exercise Not Asked    Bike Helmet Not Asked    Seat Belt Not Asked    Self-Exams Not Asked   Social History Narrative    Not on file     Social Determinants of Health     Financial Resource Strain: Not on file   Food Insecurity: Not on file   Transportation Needs: Not on file   Physical Activity: Not on file   Stress: Not on file   Social Connections: Not on file   Housing Stability: Not on file       PHYSICAL EXAM:   Unable to perform vitals or do physical exam as this is a virtual video visit.   Patient appears alert and oriented x3, no acute distress  Patient speaking complete sentences without any conversational dyspnea or respiratory distress  No coughing heard    ASSESSMENT/PLAN:   1. Chronic pain of right knee  - MRI KNEE, RIGHT ([id number]); Future  - Ortho Referral - In Network  - PHYSICAL THERAPY - INTERNAL    Reviewed xray scanned in under media as it was done at outside facility   Ice, wrap , rest as much as possible   Adrian refer to PT but may benefit from mri and ortho eval   Aware to make sure of coverage       No follow-ups on file. Time spent on encounter  11 minutes   Video time 11 minutes   Documentation time 11 minutes     Lucila Hawkins understands video evaluation is not a substitute for face-to-face examination or emergency care. Patient advised to go to ER or call 911 for worsening symptoms or acute distress. Telehealth outside of [address] Verbal Consent   I conducted a telehealth visit with Lucila Hawkins today, 11/20/23, which was completed using two-way, real-time interactive audio and video communication. This has been done in good kathi to provide continuity of care in the best interest of the provider-patient relationship, due to the COVID -19 public health crisis/national emergency where restrictions of face-to-face office visits are ongoing. Every conscious effort was taken to allow for sufficient and adequate time to complete the visit. The patient was made aware of the limitations of the telehealth visit, including treatment limitations as no physical exam could be performed. The patient was advised to call 911 or to go to the ER in case there was an emergency. The patient was also advised of the potential privacy & security concerns related to the telehealth platform. The patient was made aware of where to find [facility] notice of privacy practices, telehealth consent form and other related consent forms and documents. which are located on the [facility] website. The patient verbally agreed to telehealth consent form, related consents and the risks discussed.     Lastly, the patient confirmed that they were in [place]. Included in this visit, time may have been spent reviewing labs, medications, radiology tests and decision making. Appropriate medical decision-making and tests are ordered as detailed in the plan of care above. Coding/billing information is submitted for this visit based on complexity of care and/or time spent for the visit. This note was prepared using Tynt voice recognition dictation software. As a result errors may occur. When identified these errors have been corrected.  While every attempt is made to correct errors during dictation discrepancies may still exist.    Raghav Brand MD  11/20/2023

## 2023-12-30 ENCOUNTER — HOSPITAL ENCOUNTER (OUTPATIENT)
Age: 38
Discharge: HOME | End: 2023-12-30
Payer: COMMERCIAL

## 2023-12-30 VITALS
SYSTOLIC BLOOD PRESSURE: 134 MMHG | DIASTOLIC BLOOD PRESSURE: 84 MMHG | OXYGEN SATURATION: 99 % | RESPIRATION RATE: 20 BRPM | TEMPERATURE: 98 F | HEART RATE: 93 BPM

## 2023-12-30 DIAGNOSIS — N39.0 ACUTE UTI: Primary | ICD-10-CM

## 2023-12-30 LAB
BILIRUB UR QL STRIP: NEGATIVE
COLOR UR: YELLOW
GLUCOSE UR STRIP-MCNC: NEGATIVE MG/DL
KETONES UR STRIP-MCNC: NEGATIVE MG/DL
NITRITE UR QL STRIP: NEGATIVE
PH UR STRIP: 5.5 [PH]
PROT UR STRIP-MCNC: >=300 MG/DL
SP GR UR STRIP: >=1.03
UROBILINOGEN UR STRIP-ACNC: <2 MG/DL

## 2023-12-30 PROCEDURE — 99214 OFFICE O/P EST MOD 30 MIN: CPT

## 2023-12-30 PROCEDURE — 87186 SC STD MICRODIL/AGAR DIL: CPT | Performed by: NURSE PRACTITIONER

## 2023-12-30 PROCEDURE — 87086 URINE CULTURE/COLONY COUNT: CPT | Performed by: NURSE PRACTITIONER

## 2023-12-30 PROCEDURE — 81002 URINALYSIS NONAUTO W/O SCOPE: CPT

## 2023-12-30 PROCEDURE — 87088 URINE BACTERIA CULTURE: CPT | Performed by: NURSE PRACTITIONER

## 2023-12-30 RX ORDER — CEPHALEXIN 500 MG/1
500 CAPSULE ORAL 2 TIMES DAILY
Qty: 14 CAPSULE | Refills: 0 | Status: SHIPPED | OUTPATIENT
Start: 2023-12-30 | End: 2024-01-06

## 2024-01-05 ENCOUNTER — TELEPHONE (OUTPATIENT)
Dept: FAMILY MEDICINE CLINIC | Facility: CLINIC | Age: 39
End: 2024-01-05

## 2024-01-05 NOTE — TELEPHONE ENCOUNTER
Left detailed vm for patient to call back and schedule DM eye exam, also provided information on how to fax results if done at another facility. A [employer] message was also sent with this information.

## 2024-01-13 ENCOUNTER — PATIENT MESSAGE (OUTPATIENT)
Dept: INTERNAL MEDICINE CLINIC | Facility: CLINIC | Age: 39
End: 2024-01-13

## 2024-01-15 ENCOUNTER — HOSPITAL ENCOUNTER (OUTPATIENT)
Age: 39
Discharge: HOME | End: 2024-01-15
Payer: COMMERCIAL

## 2024-01-15 VITALS
TEMPERATURE: 97 F | OXYGEN SATURATION: 97 % | RESPIRATION RATE: 18 BRPM | DIASTOLIC BLOOD PRESSURE: 77 MMHG | SYSTOLIC BLOOD PRESSURE: 143 MMHG | HEART RATE: 76 BPM

## 2024-01-15 DIAGNOSIS — F32.A DEPRESSION, UNSPECIFIED DEPRESSION TYPE: Primary | ICD-10-CM

## 2024-01-15 PROCEDURE — 99212 OFFICE O/P EST SF 10 MIN: CPT

## 2024-01-15 NOTE — ED INITIAL ASSESSMENT (HPI)
Pt here for return to work note, reports called of feeling depressed. Pt is currently on depression treatment and reports compliant with medication just having a bad day. Pt denies any HI or SI at his time.

## 2024-01-15 NOTE — DISCHARGE INSTRUCTIONS
See your psychiatrist on Wednesday as scheduled.  Go to the emergency room if worsening symptoms, thoughts of self-harm.

## 2024-01-15 NOTE — ED PROVIDER NOTES
Patient Seen in: [facility]      History     Chief Complaint   Patient presents with    Note For Work     Stated Complaint: Doctor's Note for Work    Subjective:   38-year-old female with depression, anxiety, ADHD, type 2 diabetes presents from home with complaint of depression.  No no depression, compliant with medications.  Feeling very tearful for the last 4 days.  Thinks it may have been triggered by her father having surgery for cancer.  States this surgery screwed up her sleep schedule and she has felt depressed since.  Crying often.  Denies suicidal ideation.  States that she missed work and needs a note to return.  She does state that she had a telehealth visit 3 days ago for the same complaint.  Has continued to miss work since then.  States she feels safe at home.  Lives with multiple family members and has a good support system.  She does have a therapist and a psychiatrist.  She has a psychiatrist appointment on Wednesday.  She did have a medication change about 1 month ago.  No previous history of suicide attempts.  No history of psychiatric inpatient admission.  States she has been overeating at home, showering less, having difficulty sleeping.  She would like to try to return to work tomorrow.  States that she needs a second note to clear her to return.    The history is provided by the patient. No  was used.         Objective:   Past Medical History:   Diagnosis Date    ADHD     Anxiety     Cancer (HCC)     endometrial, s/p robotic hyst/BSO    Depression     Diabetes (HCC)     Hemorrhoid     Morbid obesity with BMI of 40.0-44.9, adult (HCC)     Obesity, unspecified     Pre-diabetes     Unspecified essential hypertension               Past Surgical History:   Procedure Laterality Date    HYSTERECTOMY  2013    TONSILLECTOMY                  Social History     Socioeconomic History    Marital status:    Tobacco Use    Smoking status: Never    Smokeless tobacco:  Never   Vaping Use    Vaping Use: Never used   Substance and Sexual Activity    Alcohol use: Not Currently     Comment: rarely    Drug use: No   Other Topics Concern    Caffeine Concern Yes     Comment: Chocolate daily;               Review of Systems    Positive for stated complaint: Doctor's Note for Work  Other systems are as noted in HPI.  Constitutional and vital signs reviewed.      All other systems reviewed and negative except as noted above.    Physical Exam     ED Triage Vitals [01/15/24 1709]   /77   Pulse 76   Resp 18   Temp 97.4 °F (36.3 °C)   Temp src Temporal   SpO2 97 %   O2 Device None (Room air)       Current:/77   Pulse 76   Temp 97.4 °F (36.3 °C) (Temporal)   Resp 18   SpO2 97%         Physical Exam  Vitals and nursing note reviewed.   Constitutional:       General: She is not in acute distress.     Appearance: Normal appearance. She is not ill-appearing or toxic-appearing.   HENT:      Head: Normocephalic and atraumatic.      Nose: Nose normal.      Mouth/Throat:      Mouth: Mucous membranes are moist.      Pharynx: Oropharynx is clear.   Eyes:      Pupils: Pupils are equal, round, and reactive to light.   Cardiovascular:      Rate and Rhythm: Normal rate and regular rhythm.      Pulses: Normal pulses.   Pulmonary:      Effort: Pulmonary effort is normal. No respiratory distress.      Breath sounds: Normal breath sounds.      Comments: Lungs clear.  No adventitious lung sounds.  No distress.  No hypoxia.  Pulse ox 97% ra. Which is normal    Abdominal:      General: Abdomen is flat.      Palpations: Abdomen is soft.   Musculoskeletal:         General: No signs of injury. Normal range of motion.      Cervical back: Normal range of motion and neck supple.   Skin:     General: Skin is warm and dry.      Capillary Refill: Capillary refill takes less than 2 seconds.   Neurological:      General: No focal deficit present.      Mental Status: She is alert and oriented to person, place,  and time.      GCS: GCS eye subscore is 4. GCS verbal subscore is 5. GCS motor subscore is 6.   Psychiatric:         Attention and Perception: Attention normal.         Mood and Affect: Mood is anxious (tearful).         Speech: Speech normal.         Behavior: Behavior normal.         Thought Content: Thought content normal.         Judgment: Judgment normal.      Comments: Crying during exam but able to give full history.  Cooperative.  Pleasant.               ED Course   Labs Reviewed - No data to display      MDM          Medical Decision Making  Depression   Patient with known history of depression having active depression symptoms over the last few days.  Triggered by a stressful family events that messed up her sleep schedule.  Patient feels reassured that her father is flying but states she cannot get her sleep schedule back on track and has now been feeling depressed.  States she cannot stop crying.  Denies overwhelming sadness.  Denies suicidal ideation.  Denies homicidal ideation.  Denies hallucinations.  Denies drug or alcohol use.  Here specifically requesting a note to return to work tomorrow.  She did have a telehealth visit with her primary doctor 3 days ago and was given a work note but those days have run out and she is requesting a second note.  She feels safe returning home and would like to attempt to go to work tomorrow.  She does have an upcoming appoint with her psychiatrist in 2 days.  She has no history of suicide attempts or psychiatric hospitalization.  Patient states that she feels safe going home and has a good support system that she lives with  Work note provided clearing her to return to work tomorrow.  She was asking for specific statements on the work note regarding ability to perform her job duties.  Patient was informed that no specific statements would be made, just that she was seen here and is cleared to return.  She was agreeable to this.  If she needs any more specific forms  filled out she will need to see her primary doctor  Patient repeatedly denies suicidal ideation and states that she feels safe to return home.  She is here with her mother  Results and plan of care discussed with the patient/family. They are in agreement with discharge. They understand to follow up with their primary doctor or the referral physician for further evaluation, especially if no improvement.  Also discussed the limitations of immediate care, patient is aware that if symptoms are worse they should go to the emergency room. Verbal and written discharge instructions were given.       Problems Addressed:  Depression, unspecified depression type: acute illness or injury        Disposition and Plan     Clinical Impression:  1. Depression, unspecified depression type         Disposition:  Discharge  1/15/2024  5:34 pm    Follow-up:  Consuelo Bangura MD  [address] [place] [id number]  [phone]          [facility]  [phone]  Call             Medications Prescribed:  Discharge Medication List as of 1/15/2024  5:36 PM

## 2024-01-27 ENCOUNTER — NURSE TRIAGE (OUTPATIENT)
Dept: INTERNAL MEDICINE CLINIC | Facility: CLINIC | Age: 39
End: 2024-01-27

## 2024-01-27 NOTE — TELEPHONE ENCOUNTER
Action Requested: Summary for Provider     []  Critical Lab, Recommendations Needed  [] Need Additional Advice  []   FYI    []   Need Orders  [] Need Medications Sent to Pharmacy  []  Other     SUMMARY: Patient was advised an office appointment for Monday or immediate care. Patient declined.    Patient states she was treated for strep throat last week and felt better, now getting bad headaches on the back of her head and forehead. Patient denies fever,stiff neck, vision changes or numbness/weakness on one side of her body. Patient denies this is the worst  headache of her life.    Patient advised to try over the counter ibuprofen/motrin per package directions. Try to go in a dark room/cool cloth over forehead.    Patient advised immediate care if worsening symptoms. Patient verbalized understanding.    Reason for call: Headache  Onset: 2 days.   Reason for Disposition   Unexplained headache that is present > 24 hours    Protocols used: Headache-A-OH

## 2024-01-29 ENCOUNTER — OFFICE VISIT (OUTPATIENT)
Dept: INTERNAL MEDICINE CLINIC | Facility: CLINIC | Age: 39
End: 2024-01-29

## 2024-01-29 ENCOUNTER — LAB ENCOUNTER (OUTPATIENT)
Dept: LAB | Age: 39
End: 2024-01-29
Attending: NURSE PRACTITIONER
Payer: COMMERCIAL

## 2024-01-29 VITALS
SYSTOLIC BLOOD PRESSURE: 119 MMHG | HEART RATE: 90 BPM | WEIGHT: 246 LBS | DIASTOLIC BLOOD PRESSURE: 79 MMHG | HEIGHT: 68 IN | BODY MASS INDEX: 37.28 KG/M2

## 2024-01-29 DIAGNOSIS — E11.9 TYPE 2 DIABETES MELLITUS WITHOUT COMPLICATION, WITHOUT LONG-TERM CURRENT USE OF INSULIN (HCC): ICD-10-CM

## 2024-01-29 DIAGNOSIS — J02.0 STREP PHARYNGITIS: Primary | ICD-10-CM

## 2024-01-29 DIAGNOSIS — L72.9 CYST OF SKIN: ICD-10-CM

## 2024-01-29 DIAGNOSIS — I10 ESSENTIAL HYPERTENSION WITH GOAL BLOOD PRESSURE LESS THAN 140/90: ICD-10-CM

## 2024-01-29 LAB
ANION GAP SERPL CALC-SCNC: 9 MMOL/L (ref 0–18)
BUN BLD-MCNC: 15 MG/DL (ref 9–23)
BUN/CREAT SERPL: 19.7 (ref 10–20)
CALCIUM BLD-MCNC: 9.7 MG/DL (ref 8.7–10.4)
CHLORIDE SERPL-SCNC: 102 MMOL/L (ref 98–112)
CO2 SERPL-SCNC: 27 MMOL/L (ref 21–32)
CREAT BLD-MCNC: 0.76 MG/DL
EGFRCR SERPLBLD CKD-EPI 2021: 102 ML/MIN/1.73M2 (ref 60–?)
EST. AVERAGE GLUCOSE BLD GHB EST-MCNC: 171 MG/DL (ref 68–126)
FASTING STATUS PATIENT QL REPORTED: NO
GLUCOSE BLD-MCNC: 153 MG/DL (ref 70–99)
HBA1C MFR BLD: 7.6 % (ref ?–5.7)
OSMOLALITY SERPL CALC.SUM OF ELEC: 290 MOSM/KG (ref 275–295)
POTASSIUM SERPL-SCNC: 3.8 MMOL/L (ref 3.5–5.1)
SODIUM SERPL-SCNC: 138 MMOL/L (ref 136–145)

## 2024-01-29 PROCEDURE — 3074F SYST BP LT 130 MM HG: CPT | Performed by: NURSE PRACTITIONER

## 2024-01-29 PROCEDURE — 3078F DIAST BP <80 MM HG: CPT | Performed by: NURSE PRACTITIONER

## 2024-01-29 PROCEDURE — 3051F HG A1C>EQUAL 7.0%<8.0%: CPT | Performed by: NURSE PRACTITIONER

## 2024-01-29 PROCEDURE — 3008F BODY MASS INDEX DOCD: CPT | Performed by: NURSE PRACTITIONER

## 2024-01-29 PROCEDURE — 99214 OFFICE O/P EST MOD 30 MIN: CPT | Performed by: NURSE PRACTITIONER

## 2024-01-29 PROCEDURE — 83036 HEMOGLOBIN GLYCOSYLATED A1C: CPT

## 2024-01-29 PROCEDURE — 36415 COLL VENOUS BLD VENIPUNCTURE: CPT

## 2024-01-29 PROCEDURE — 80048 BASIC METABOLIC PNL TOTAL CA: CPT

## 2024-01-29 NOTE — PROGRESS NOTES
Elizabeth Tsai is a 38 year old female.  Chief Complaint   Patient presents with    Headache     Xs 5 days  Had strep and that's when the headache started     HPI:   She presents today for follow up. She tested positive for strep throat last Sunday 1/21 and was treated with amoxicillin. She is currently still taking medication. She was prescribed treatment for 10 days.     She presents with headaches. She currently rates the headache a 1/10. She states overall she is feeling better.  She denies any pain with swallowing, sore throat or fevers. She is taking ibuprofen as needed.     She has a cyst on the right side of her scalp. The area is tender to touch. There is no redness present.     She has a history of diabetes her last A1c was completed 8/2023 8.5. She is currently taking ozempic and metformin.     Current Outpatient Medications   Medication Sig Dispense Refill    DULoxetine 30 MG Oral Cap DR Particles Take 2 capsules (60 mg total) by mouth daily.      semaglutide 2 MG/3ML Subcutaneous Solution Pen-injector Inject 0.25 mg into the skin once a week. 3 each 3    fenofibrate 145 MG Oral Tab Take 1 tablet (145 mg total) by mouth daily. 90 tablet 3    lisinopril-hydroCHLOROthiazide 20-25 MG Oral Tab TAKE ONE TABLET BY MOUTH ONE TIME DAILY. 90 tablet 3    metFORMIN 1000 MG Oral Tab Take 1 tablet (1,000 mg total) by mouth 2 (two) times daily with meals. 180 tablet 3    Cholecalciferol (VITAMIN D) 50 MCG (2000 UT) Oral Tab Take by oral route 2000 units daily per 30 tablet 0    FreeStyle Lancets Does not apply Misc Test blood sugar daily as directed 100 each 3    Glucose Blood (FREESTYLE TEST) In Vitro Strip Test blood sugar daily as directed 100 strip 3    FreeStyle System Does not apply Kit Test blood sugar daily as directed 1 each 0    OMEPRAZOLE 20 MG Oral Capsule Delayed Release TAKE ONE CAPSULE BY MOUTH IN THE MORNING BEFORE BREAKFAST 90 capsule 0      Past Medical History:   Diagnosis Date    ADHD      Anxiety     Cancer (HCC)     endometrial, s/p robotic hyst/BSO    Depression     Diabetes (HCC)     Hemorrhoid     Morbid obesity with BMI of 40.0-44.9, adult (HCC)     Obesity, unspecified     Pre-diabetes     Unspecified essential hypertension       Past Surgical History:   Procedure Laterality Date    HYSTERECTOMY  2013    TONSILLECTOMY        Social History:  Social History     Socioeconomic History    Marital status:    Tobacco Use    Smoking status: Never    Smokeless tobacco: Never   Vaping Use    Vaping Use: Never used   Substance and Sexual Activity    Alcohol use: Not Currently     Comment: rarely    Drug use: No   Other Topics Concern    Caffeine Concern Yes     Comment: Chocolate daily;       Family History   Problem Relation Age of Onset    Diabetes Mother     Hypertension Mother     Lipids Mother         Hyperlipidemia    Thyroid Disorder Mother         Hypothyroidism    Pulmonary Disease Mother         COPD    Anemia Mother     Asthma Mother     Depression Mother     Obesity Mother     Breast Cancer Maternal Grandmother     Depression Maternal Grandmother     Breast Cancer Maternal Aunt     Breast Cancer Maternal Cousin Female     Cancer Father         kidney cancer    Diabetes Maternal Grandfather     Hypertension Maternal Grandfather     Obesity Maternal Grandfather     Cancer Paternal Grandfather     Dementia Paternal Grandmother     Asthma Brother     Obesity Brother       No Known Allergies     REVIEW OF SYSTEMS:     Review of Systems   Constitutional:  Negative for fever.   HENT:  Negative for congestion, ear pain and sore throat.    Respiratory:  Negative for cough, shortness of breath and wheezing.    Cardiovascular:  Negative for chest pain.   Gastrointestinal:  Negative for abdominal pain.   Genitourinary: Negative.    Musculoskeletal: Negative.    Skin: Negative.    Neurological:  Positive for headaches.   Psychiatric/Behavioral: Negative.        Wt Readings from Last 5 Encounters:    01/29/24 246 lb (111.6 kg)   12/20/23 251 lb (113.9 kg)   11/14/23 251 lb (113.9 kg)   11/09/23 255 lb (115.7 kg)   10/18/23 253 lb (114.8 kg)     Body mass index is 37.4 kg/m².      EXAM:   /79   Pulse 90   Ht 5' 8\" (1.727 m)   Wt 246 lb (111.6 kg)   BMI 37.40 kg/m²     Physical Exam  Vitals reviewed.   Constitutional:       Appearance: Normal appearance.   HENT:      Head: Normocephalic.        Comments: Small cyst present on the right scalp. No redness. There is tenderness with palpation.      Right Ear: Tympanic membrane normal.      Left Ear: Tympanic membrane normal.      Nose: No congestion.      Mouth/Throat:      Pharynx: No posterior oropharyngeal erythema.   Cardiovascular:      Rate and Rhythm: Normal rate and regular rhythm.      Pulses: Normal pulses.   Pulmonary:      Breath sounds: Normal breath sounds. No wheezing.   Musculoskeletal:         General: No swelling. Normal range of motion.   Skin:     General: Skin is warm and dry.   Neurological:      Mental Status: She is alert and oriented to person, place, and time.   Psychiatric:         Mood and Affect: Mood normal.         Behavior: Behavior normal.            ASSESSMENT AND PLAN:   1. Strep pharyngitis  - continue amoxicillin as prescribed  - ok to take ibuprofen as needed for headaches.     2. Type 2 diabetes mellitus without complication, without long-term current use of insulin (HCC)  - currently taking ozempic 0.25mg and metformin   - Hemoglobin A1C [E]; Future  - Basic Metabolic Panel (8) [E]; Future    3. Essential hypertension with goal blood pressure less than 140/90  - CPM   - stable in office     4. Cyst of skin  - will monitor for now if it increases in size she will need to follow up with surgery.       The patient indicates understanding of these issues and agrees to the plan.  Return for if symptoms do not resolve.

## 2024-02-05 ENCOUNTER — LAB ENCOUNTER (OUTPATIENT)
Dept: LAB | Age: 39
End: 2024-02-05
Attending: NURSE PRACTITIONER
Payer: COMMERCIAL

## 2024-02-05 ENCOUNTER — OFFICE VISIT (OUTPATIENT)
Dept: INTERNAL MEDICINE CLINIC | Facility: CLINIC | Age: 39
End: 2024-02-05

## 2024-02-05 VITALS
HEIGHT: 68 IN | BODY MASS INDEX: 36.83 KG/M2 | RESPIRATION RATE: 16 BRPM | HEART RATE: 93 BPM | DIASTOLIC BLOOD PRESSURE: 89 MMHG | SYSTOLIC BLOOD PRESSURE: 138 MMHG | WEIGHT: 243 LBS

## 2024-02-05 DIAGNOSIS — R31.9 HEMATURIA, UNSPECIFIED TYPE: ICD-10-CM

## 2024-02-05 DIAGNOSIS — E87.1 HYPONATREMIA: ICD-10-CM

## 2024-02-05 DIAGNOSIS — Z87.440 HISTORY OF UTI: Primary | ICD-10-CM

## 2024-02-05 LAB
ANION GAP SERPL CALC-SCNC: 6 MMOL/L (ref 0–18)
BILIRUB UR QL: NEGATIVE
BUN BLD-MCNC: 12 MG/DL (ref 9–23)
BUN/CREAT SERPL: 14.8 (ref 10–20)
CALCIUM BLD-MCNC: 10.1 MG/DL (ref 8.7–10.4)
CHLORIDE SERPL-SCNC: 103 MMOL/L (ref 98–112)
CLARITY UR: CLEAR
CO2 SERPL-SCNC: 29 MMOL/L (ref 21–32)
CREAT BLD-MCNC: 0.81 MG/DL
EGFRCR SERPLBLD CKD-EPI 2021: 94 ML/MIN/1.73M2 (ref 60–?)
FASTING STATUS PATIENT QL REPORTED: NO
GLUCOSE BLD-MCNC: 165 MG/DL (ref 70–99)
GLUCOSE UR-MCNC: NORMAL MG/DL
HGB UR QL STRIP.AUTO: NEGATIVE
KETONES UR-MCNC: NEGATIVE MG/DL
LEUKOCYTE ESTERASE UR QL STRIP.AUTO: NEGATIVE
NITRITE UR QL STRIP.AUTO: NEGATIVE
OSMOLALITY SERPL CALC.SUM OF ELEC: 289 MOSM/KG (ref 275–295)
PH UR: 5 [PH] (ref 5–8)
POTASSIUM SERPL-SCNC: 4 MMOL/L (ref 3.5–5.1)
PROT UR-MCNC: NEGATIVE MG/DL
SODIUM SERPL-SCNC: 138 MMOL/L (ref 136–145)
SP GR UR STRIP: 1.02 (ref 1–1.03)
UROBILINOGEN UR STRIP-ACNC: NORMAL

## 2024-02-05 PROCEDURE — 36415 COLL VENOUS BLD VENIPUNCTURE: CPT

## 2024-02-05 PROCEDURE — 3008F BODY MASS INDEX DOCD: CPT | Performed by: NURSE PRACTITIONER

## 2024-02-05 PROCEDURE — 3079F DIAST BP 80-89 MM HG: CPT | Performed by: NURSE PRACTITIONER

## 2024-02-05 PROCEDURE — 81015 MICROSCOPIC EXAM OF URINE: CPT

## 2024-02-05 PROCEDURE — 3075F SYST BP GE 130 - 139MM HG: CPT | Performed by: NURSE PRACTITIONER

## 2024-02-05 PROCEDURE — 81001 URINALYSIS AUTO W/SCOPE: CPT

## 2024-02-05 PROCEDURE — 80048 BASIC METABOLIC PNL TOTAL CA: CPT

## 2024-02-05 PROCEDURE — 99213 OFFICE O/P EST LOW 20 MIN: CPT | Performed by: NURSE PRACTITIONER

## 2024-02-05 RX ORDER — SULFAMETHOXAZOLE AND TRIMETHOPRIM 800; 160 MG/1; MG/1
TABLET ORAL
COMMUNITY
Start: 2024-02-04

## 2024-02-05 RX ORDER — PHENAZOPYRIDINE HYDROCHLORIDE 100 MG/1
TABLET, FILM COATED ORAL
COMMUNITY
Start: 2024-02-04

## 2024-02-07 ENCOUNTER — HOSPITAL ENCOUNTER (OUTPATIENT)
Dept: GENERAL RADIOLOGY | Facility: HOSPITAL | Age: 39
Discharge: HOME | End: 2024-02-07
Attending: NURSE PRACTITIONER
Payer: COMMERCIAL

## 2024-02-07 DIAGNOSIS — R31.9 HEMATURIA, UNSPECIFIED TYPE: ICD-10-CM

## 2024-02-07 PROCEDURE — 74018 RADEX ABDOMEN 1 VIEW: CPT | Performed by: NURSE PRACTITIONER

## 2024-03-11 ENCOUNTER — OFFICE VISIT (OUTPATIENT)
Dept: INTERNAL MEDICINE CLINIC | Facility: CLINIC | Age: 39
End: 2024-03-11
Payer: COMMERCIAL

## 2024-03-11 VITALS
SYSTOLIC BLOOD PRESSURE: 128 MMHG | BODY MASS INDEX: 37.59 KG/M2 | WEIGHT: 248 LBS | DIASTOLIC BLOOD PRESSURE: 86 MMHG | HEART RATE: 92 BPM | HEIGHT: 68 IN | RESPIRATION RATE: 16 BRPM

## 2024-03-11 DIAGNOSIS — J02.9 SORE THROAT: Primary | ICD-10-CM

## 2024-03-11 LAB
CONTROL LINE PRESENT WITH A CLEAR BACKGROUND (YES/NO): YES YES/NO
KIT LOT #: NORMAL NUMERIC
STREP GRP A CUL-SCR: NEGATIVE

## 2024-03-11 PROCEDURE — 3074F SYST BP LT 130 MM HG: CPT | Performed by: NURSE PRACTITIONER

## 2024-03-11 PROCEDURE — 3079F DIAST BP 80-89 MM HG: CPT | Performed by: NURSE PRACTITIONER

## 2024-03-11 PROCEDURE — 99213 OFFICE O/P EST LOW 20 MIN: CPT | Performed by: NURSE PRACTITIONER

## 2024-03-11 PROCEDURE — 87880 STREP A ASSAY W/OPTIC: CPT | Performed by: NURSE PRACTITIONER

## 2024-03-11 PROCEDURE — 3008F BODY MASS INDEX DOCD: CPT | Performed by: NURSE PRACTITIONER

## 2024-03-11 NOTE — PROGRESS NOTES
Elizabeth Tsai is a 38 year old female.  Chief Complaint   Patient presents with    Sore Throat     HPI:   She presents with a sore throat, pain with swallowing, congestion, sinus pressure and post-nasal drip. Her symptoms started yesterday.     She denies any fevers.     She is currently taking ibuprofen as needed for her symptoms. She did not take a Covid-19 test. She recently had strep throat in January 2024. She states her symptoms feel different.     Current Outpatient Medications   Medication Sig Dispense Refill    semaglutide 2 MG/3ML Subcutaneous Solution Pen-injector Inject 0.5 mg into the skin once a week. 3 each 0    DULoxetine 30 MG Oral Cap DR Particles Take 2 capsules (60 mg total) by mouth daily.      fenofibrate 145 MG Oral Tab Take 1 tablet (145 mg total) by mouth daily. 90 tablet 3    lisinopril-hydroCHLOROthiazide 20-25 MG Oral Tab TAKE ONE TABLET BY MOUTH ONE TIME DAILY. 90 tablet 3    metFORMIN 1000 MG Oral Tab Take 1 tablet (1,000 mg total) by mouth 2 (two) times daily with meals. 180 tablet 3    Cholecalciferol (VITAMIN D) 50 MCG (2000 UT) Oral Tab Take by oral route 2000 units daily per 30 tablet 0    FreeStyle Lancets Does not apply Misc Test blood sugar daily as directed 100 each 3    Glucose Blood (FREESTYLE TEST) In Vitro Strip Test blood sugar daily as directed 100 strip 3    FreeStyle System Does not apply Kit Test blood sugar daily as directed 1 each 0    OMEPRAZOLE 20 MG Oral Capsule Delayed Release TAKE ONE CAPSULE BY MOUTH IN THE MORNING BEFORE BREAKFAST 90 capsule 0      Past Medical History:   Diagnosis Date    ADHD     Anxiety     Cancer (HCC)     endometrial, s/p robotic hyst/BSO    Depression     Diabetes (HCC)     Hemorrhoid     Morbid obesity with BMI of 40.0-44.9, adult (HCC)     Obesity, unspecified     Pre-diabetes     Unspecified essential hypertension       Past Surgical History:   Procedure Laterality Date    HYSTERECTOMY  2013    TONSILLECTOMY        Social  History:  Social History     Socioeconomic History    Marital status:    Tobacco Use    Smoking status: Never    Smokeless tobacco: Never   Vaping Use    Vaping Use: Never used   Substance and Sexual Activity    Alcohol use: Not Currently     Comment: rarely    Drug use: No   Other Topics Concern    Caffeine Concern Yes     Comment: Chocolate daily;       Family History   Problem Relation Age of Onset    Diabetes Mother     Hypertension Mother     Lipids Mother         Hyperlipidemia    Thyroid Disorder Mother         Hypothyroidism    Pulmonary Disease Mother         COPD    Anemia Mother     Asthma Mother     Depression Mother     Obesity Mother     Breast Cancer Maternal Grandmother     Depression Maternal Grandmother     Breast Cancer Maternal Aunt     Breast Cancer Maternal Cousin Female     Cancer Father         kidney cancer    Diabetes Maternal Grandfather     Hypertension Maternal Grandfather     Obesity Maternal Grandfather     Cancer Paternal Grandfather     Dementia Paternal Grandmother     Asthma Brother     Obesity Brother       No Known Allergies     REVIEW OF SYSTEMS:     Review of Systems   Constitutional:  Negative for fever.   HENT:  Positive for congestion, postnasal drip, sinus pressure and sore throat. Negative for ear pain.    Respiratory:  Negative for cough, shortness of breath and wheezing.    Cardiovascular:  Negative for chest pain.   Gastrointestinal:  Positive for diarrhea and nausea. Negative for abdominal pain.   Genitourinary: Negative.    Musculoskeletal:  Negative for arthralgias.   Skin: Negative.    Neurological:  Negative for headaches.   Psychiatric/Behavioral: Negative.        Wt Readings from Last 5 Encounters:   03/11/24 248 lb (112.5 kg)   02/05/24 243 lb (110.2 kg)   01/29/24 246 lb (111.6 kg)   12/20/23 251 lb (113.9 kg)   11/14/23 251 lb (113.9 kg)     Body mass index is 37.71 kg/m².      EXAM:   /86   Pulse 92   Resp 16   Ht 5' 8\" (1.727 m)   Wt 248 lb  (112.5 kg)   BMI 37.71 kg/m²     Physical Exam  Vitals reviewed.   Constitutional:       Appearance: Normal appearance.   HENT:      Head: Normocephalic.      Right Ear: Tympanic membrane normal.      Left Ear: Tympanic membrane normal.      Nose: Congestion present.      Mouth/Throat:      Pharynx: Posterior oropharyngeal erythema present.   Eyes:      Extraocular Movements: Extraocular movements intact.      Pupils: Pupils are equal, round, and reactive to light.   Cardiovascular:      Rate and Rhythm: Normal rate and regular rhythm.      Pulses: Normal pulses.   Pulmonary:      Breath sounds: Normal breath sounds. No wheezing.   Musculoskeletal:         General: No swelling. Normal range of motion.   Skin:     General: Skin is warm and dry.   Neurological:      Mental Status: She is alert and oriented to person, place, and time.   Psychiatric:         Mood and Affect: Mood normal.         Behavior: Behavior normal.            ASSESSMENT AND PLAN:   1. Sore throat  - strep test negative in office  - will rule out Covid-19 and influenza   - ok to continue ibuprofen and add Mucinex   - POC Rapid Strep [08020]  - Grp A Strep Cult, Throat [E]; Future  - Grp A Strep Cult, Throat [E]  - SARS-CoV-2/Flu A and B/RSV by PCR ([facility]); Future  - SARS-CoV-2/Flu A and B/RSV by PCR ([facility])      The patient indicates understanding of these issues and agrees to the plan.  Return for if symptoms do not resolve.

## 2024-03-12 LAB
FLUAV + FLUBV RNA SPEC NAA+PROBE: NOT DETECTED
FLUAV + FLUBV RNA SPEC NAA+PROBE: NOT DETECTED
RSV RNA SPEC NAA+PROBE: NOT DETECTED
SARS-COV-2 RNA RESP QL NAA+PROBE: NOT DETECTED

## 2024-04-08 NOTE — TELEPHONE ENCOUNTER
Dr Baldev Bhandari please advise and generate letter if you approve . Is This A New Presentation, Or A Follow-Up?: Skin Cancer How Severe Is Your Skin Cancer?: mild Has Your Skin Cancer Been Treated?: not been treated

## 2024-05-06 ENCOUNTER — OFFICE VISIT (OUTPATIENT)
Dept: INTERNAL MEDICINE CLINIC | Facility: CLINIC | Age: 39
End: 2024-05-06

## 2024-05-06 VITALS
HEIGHT: 68 IN | BODY MASS INDEX: 37.44 KG/M2 | SYSTOLIC BLOOD PRESSURE: 130 MMHG | OXYGEN SATURATION: 97 % | DIASTOLIC BLOOD PRESSURE: 85 MMHG | RESPIRATION RATE: 16 BRPM | HEART RATE: 89 BPM | WEIGHT: 247 LBS

## 2024-05-06 DIAGNOSIS — J02.9 SORE THROAT: Primary | ICD-10-CM

## 2024-05-06 DIAGNOSIS — I10 ESSENTIAL HYPERTENSION WITH GOAL BLOOD PRESSURE LESS THAN 140/90: ICD-10-CM

## 2024-05-06 PROCEDURE — 99213 OFFICE O/P EST LOW 20 MIN: CPT | Performed by: NURSE PRACTITIONER

## 2024-05-06 PROCEDURE — 87880 STREP A ASSAY W/OPTIC: CPT | Performed by: NURSE PRACTITIONER

## 2024-05-06 PROCEDURE — 3075F SYST BP GE 130 - 139MM HG: CPT | Performed by: NURSE PRACTITIONER

## 2024-05-06 PROCEDURE — 3079F DIAST BP 80-89 MM HG: CPT | Performed by: NURSE PRACTITIONER

## 2024-05-06 PROCEDURE — 3008F BODY MASS INDEX DOCD: CPT | Performed by: NURSE PRACTITIONER

## 2024-05-06 RX ORDER — CARIPRAZINE 1.5 MG/1
1.5 CAPSULE, GELATIN COATED ORAL
COMMUNITY

## 2024-05-06 NOTE — PROGRESS NOTES
Elizabeth Tsai is a 38 year old female.  Chief Complaint   Patient presents with    Sore Throat     HPI:   She presents with a dry cough, sore throat on the right side, pain with swallowing and right ear pain.  Her symptoms started on Saturday. She denies any fevers. She has been taking Advil for her symptoms with relief.       Current Outpatient Medications   Medication Sig Dispense Refill    Cariprazine HCl (VRAYLAR) 1.5 MG Oral Cap Take 1.5 mg by mouth once daily.      semaglutide 2 MG/3ML Subcutaneous Solution Pen-injector Inject 0.5 mg into the skin once a week. 3 each 0    DULoxetine 30 MG Oral Cap DR Particles Take 2 capsules (60 mg total) by mouth daily.      fenofibrate 145 MG Oral Tab Take 1 tablet (145 mg total) by mouth daily. 90 tablet 3    lisinopril-hydroCHLOROthiazide 20-25 MG Oral Tab TAKE ONE TABLET BY MOUTH ONE TIME DAILY. 90 tablet 3    Cholecalciferol (VITAMIN D) 50 MCG (2000 UT) Oral Tab Take by oral route 2000 units daily per 30 tablet 0    FreeStyle Lancets Does not apply Misc Test blood sugar daily as directed 100 each 3    Glucose Blood (FREESTYLE TEST) In Vitro Strip Test blood sugar daily as directed 100 strip 3    FreeStyle System Does not apply Kit Test blood sugar daily as directed 1 each 0    OMEPRAZOLE 20 MG Oral Capsule Delayed Release TAKE ONE CAPSULE BY MOUTH IN THE MORNING BEFORE BREAKFAST 90 capsule 0      Past Medical History:    ADHD    Anxiety    Cancer (HCC)    endometrial, s/p robotic hyst/BSO    Depression    Diabetes (HCC)    Hemorrhoid    Morbid obesity with BMI of 40.0-44.9, adult (HCC)    Obesity, unspecified    Pre-diabetes    Unspecified essential hypertension      Past Surgical History:   Procedure Laterality Date    Hysterectomy  2013    Tonsillectomy        Social History:  Social History     Socioeconomic History    Marital status:    Tobacco Use    Smoking status: Never    Smokeless tobacco: Never   Vaping Use    Vaping status: Never Used    Substance and Sexual Activity    Alcohol use: Not Currently     Comment: rarely    Drug use: No   Other Topics Concern    Caffeine Concern Yes     Comment: Chocolate daily;       Family History   Problem Relation Age of Onset    Diabetes Mother     Hypertension Mother     Lipids Mother         Hyperlipidemia    Thyroid Disorder Mother         Hypothyroidism    Pulmonary Disease Mother         COPD    Anemia Mother     Asthma Mother     Depression Mother     Obesity Mother     Breast Cancer Maternal Grandmother     Depression Maternal Grandmother     Breast Cancer Maternal Aunt     Breast Cancer Maternal Cousin Female     Cancer Father         kidney cancer    Diabetes Maternal Grandfather     Hypertension Maternal Grandfather     Obesity Maternal Grandfather     Cancer Paternal Grandfather     Dementia Paternal Grandmother     Asthma Brother     Obesity Brother       No Known Allergies     REVIEW OF SYSTEMS:     Review of Systems   Constitutional:  Negative for fever.   HENT:  Positive for ear pain (right ear pain) and sore throat (right sided sore throat). Negative for congestion, postnasal drip and rhinorrhea.    Respiratory:  Positive for cough. Negative for shortness of breath and wheezing.    Cardiovascular:  Negative for chest pain.   Gastrointestinal:  Negative for abdominal pain.   Genitourinary: Negative.    Musculoskeletal:  Negative for arthralgias.   Skin: Negative.    Neurological:  Negative for dizziness and headaches.   Psychiatric/Behavioral: Negative.        Wt Readings from Last 5 Encounters:   05/06/24 247 lb (112 kg)   03/11/24 248 lb (112.5 kg)   02/05/24 243 lb (110.2 kg)   01/29/24 246 lb (111.6 kg)   12/20/23 251 lb (113.9 kg)     Body mass index is 37.56 kg/m².      EXAM:   /85   Pulse 89   Resp 16   Ht 5' 8\" (1.727 m)   Wt 247 lb (112 kg)   BMI 37.56 kg/m²     Physical Exam  Vitals reviewed.   Constitutional:       Appearance: Normal appearance.   HENT:      Head:  Normocephalic.      Right Ear: Tympanic membrane normal.      Left Ear: Tympanic membrane normal.      Nose: No congestion.      Mouth/Throat:      Pharynx: Posterior oropharyngeal erythema present.   Cardiovascular:      Rate and Rhythm: Normal rate and regular rhythm.      Pulses: Normal pulses.   Pulmonary:      Breath sounds: Normal breath sounds. No wheezing.   Musculoskeletal:         General: No swelling. Normal range of motion.   Skin:     General: Skin is warm and dry.   Neurological:      Mental Status: She is alert and oriented to person, place, and time.   Psychiatric:         Mood and Affect: Mood normal.         Behavior: Behavior normal.            ASSESSMENT AND PLAN:   1. Sore throat  - negative rapid strep test in office   - continue advil as needed for sore throat   - POC Rapid Strep [59004]    2. Essential hypertension with goal blood pressure less than 140/90  - BP stable in office  - CPM      The patient indicates understanding of these issues and agrees to the plan.  Return for if symptoms do not resolve.   Review of chart, labs, imaging; d/w family members and Medical Attending. Review of chart, labs, imaging; d/w family members, RN and Medical Attending.

## 2024-05-17 ENCOUNTER — OFFICE VISIT (OUTPATIENT)
Dept: OBGYN CLINIC | Facility: CLINIC | Age: 39
End: 2024-05-17

## 2024-05-17 VITALS
SYSTOLIC BLOOD PRESSURE: 127 MMHG | DIASTOLIC BLOOD PRESSURE: 88 MMHG | BODY MASS INDEX: 35.45 KG/M2 | HEART RATE: 96 BPM | HEIGHT: 70 IN | WEIGHT: 247.63 LBS

## 2024-05-17 DIAGNOSIS — Z01.411 ENCOUNTER FOR GYNECOLOGICAL EXAMINATION WITH ABNORMAL FINDING: Primary | ICD-10-CM

## 2024-05-17 DIAGNOSIS — Z12.4 PAP SMEAR FOR CERVICAL CANCER SCREENING: ICD-10-CM

## 2024-05-17 DIAGNOSIS — Z11.3 ROUTINE SCREENING FOR STI (SEXUALLY TRANSMITTED INFECTION): ICD-10-CM

## 2024-05-17 DIAGNOSIS — Z78.0 MENOPAUSE: ICD-10-CM

## 2024-05-17 DIAGNOSIS — N76.0 VAGINITIS AND VULVOVAGINITIS: ICD-10-CM

## 2024-05-17 DIAGNOSIS — R30.0 DYSURIA: ICD-10-CM

## 2024-05-17 PROCEDURE — 3008F BODY MASS INDEX DOCD: CPT | Performed by: ADVANCED PRACTICE MIDWIFE

## 2024-05-17 PROCEDURE — 3079F DIAST BP 80-89 MM HG: CPT | Performed by: ADVANCED PRACTICE MIDWIFE

## 2024-05-17 PROCEDURE — 3074F SYST BP LT 130 MM HG: CPT | Performed by: ADVANCED PRACTICE MIDWIFE

## 2024-05-17 PROCEDURE — 99396 PREV VISIT EST AGE 40-64: CPT | Performed by: ADVANCED PRACTICE MIDWIFE

## 2024-05-17 NOTE — PROGRESS NOTES
Patient seen in conjunction with [facility]. I personally witnessed the patient's exam and medical decision making on this date of service.  I was physically present in the room for the performance of the E/M service.  I have reviewed the CNM student's documentation and findings including history, Exam, and Medical Decision Making, edited the document for accuracy and verify that it represents the clinical findings and services performed.

## 2024-05-17 NOTE — PATIENT INSTRUCTIONS
Why Have a Pap Test?  Early on, cervical changes don't cause symptoms. Often, the only way to know you have cervical changes is to do a Pap test. A Pap test can find these problems early, when they are easier to treat. Pap tests can also detect some infections of the cervix and vagina.  What is a Pap test?  A Pap test is a procedure that helps find changes in the cervix that may lead to cancer. The cervix is the part of the uterus that opens into the vagina. For this test, a small sample of cells is taken from the cervix. This is done in your healthcare provider’s office. The cells are then analyzed in a lab. A Pap test is a safe procedure. It takes just a few minutes and causes little or no discomfort.  The HPV connection  Human papillomavirus or HPV is a family of viruses that spread through skin contact. Certain types are almost always spread through sexual contact. Some HPV types cause genital warts (condyloma). But not all types of HPV cause visible symptoms. Certain types cause cell changes (dysplasia) in the cervix that can lead to cancer. In fact, HPV infection is the most important risk factor for cervical cancer. Healthcare providers can now test for HPV. Testing for HPV is often done with the Pap test. That’s why it’s important to have Pap tests as recommended by your healthcare provider. This helps ensure that any abnormal cells will be found and treated before they become cancerous.  Who should have a Pap test and HPV test?  Ask your healthcare provider when to start having Pap tests, whether you should have an HPV test done at the same time, and how often to have them. Follow these guidelines from the American Cancer Society for cervical cancer screening:  A first Pap test at age 21. And then every 3 years until age 29, HPV testing is not recommended during this time, though it may be done to follow-up on an abnormal Pap test.  Starting at age 30, the preferred testing is a Pap test done with an HPV  test every 5 years. This should be done until age 65. Another option for women in this 30 to 65 age group is to have just the Pap test done every 3 years.  You may need a different screening schedule if you are at high risk for cervical cancer. Risk factors include having HIV, a weak immune system, or exposure to the medicine MIKE while your mother was pregnant with you. Talk with your healthcare provider about the best schedule for you.  If you’re over 65 and have had regular screenings for the last 10 years with no abnormal results in the last 20 years, you may stop cervical cancer screening.  If you had a hysterectomy that included removing your cervix, you can stop screening unless the hysterectomy was done to treat cervical cancer or pre-cancer. If you still have your cervix after the hysterectomy, you should continue screening according to the above guidelines.  Routine testing does not need to be done each year. However, if your test is abnormal, your provider will let you know how often to be tested.   Women who have been vaccinated for HPV should still follow these guidelines.  If you have had cervical cancer, talk to your healthcare provider about the screening plan that's best for you.  [employer] last reviewed this educational content on 9/1/2017 © 2000-2020 The [employer]. [address] [place] [address], [place] [address]. All rights reserved. This information is not intended as a substitute for professional medical care. Always follow your healthcare professional's instructions.        Breast Health: Breast Self-Awareness  What is breast self-awareness?  Breast self-awareness is knowing how your breasts normally look and feel. Your breasts change as you go through different stages of your life. So it’s important to learn what is normal for your breasts. Knowing about your breasts helps you spot any changes in them right away. Tell your healthcare provider about any changes.   Why is breast  self-awareness important?   Many experts now say that women should focus on breast self-awareness instead of doing a breast self-examination (BSE). These experts include the American Cancer Society and the American Congress of Obstetricians and Gynecologists. Some experts even advise not teaching women to do a BSE. That’s because research hasn’t shown a clear benefit to doing BSEs.   Breast self-awareness is different than a BSE. It isn’t about following a certain method and schedule. It’s about knowing what's normal for your breasts. That way you can spot even small changes right away. If you see any changes, tell your healthcare provider.   Changes to look for  Call your healthcare provider if you find any changes in your breasts that worry you. These changes may be:   A lump  Nipple discharge other than breast milk, especially if it's bloody  Swelling  A change in size or shape  Skin changes, such as redness, thickening, or dimpling of the skin  Swollen lymph nodes in the armpit  Nipple problems, such as pain or redness  If you find a lump  Call your provider if you find lumpiness in one breast. Also call if you feel something different in the tissue or feel a definite lump. Sometimes lumpiness may be due to menstrual changes. But there may be reason for concern.   Your provider may want to see you right away if you have:   Nipple discharge that is bloody  Skin changes on your breast, such as dimpling or puckering  It’s okay to be upset if you find a lump. Be sure to call your provider right away. Remember that most breast lumps are benign. This means they are not cancer.   [employer] last reviewed this educational content on 5/1/2020 © 2000-2020 The [employer]. [address] [place] [address], [place] [address]. All rights reserved. This information is not intended as a substitute for professional medical care. Always follow your healthcare professional's instructions.        Understanding STIs    When it comes  to sex, nothing is risk-free. Any sexual contact with the penis, vagina, anus, or mouth can spread a sexually transmitted infection (STI). These include chlamydia, gonorrhea, herpes, HIV, and genital warts. STIs are also known as sexually transmitted diseases (STDs). The only sure way to prevent STIs is not having sex (abstinence). But there are ways to make sex safer. Use a latex condom each time you have sex. And choose your partner wisely.  Use condoms for safer sex  If you have sex, latex condoms provide the best protection against STIs. Latex condoms stop the exchange of body fluids that carry certain STIs. They also limit contact with affected skin. Be aware that a condom doesn’t cover all skin. So affected skin that isn't covered can still transfer disease. But you’re safer with a condom than without one. Use a condom even if you use other birth control. Birth control methods such as the pill or IUD help prevent pregnancy, but they don't protect against STIs.  Choose the right condom  Condoms made of latex prevent disease best. If you’re allergic to latex, use polyurethane condoms instead. Male condoms fit over the penis. Female condoms line the vagina. Before buying a condom, read the label to be sure it prevents disease. Some novelty condoms don’t.  The right lubricant helps  Buy lubricated condoms or use lubricant. This provides greater comfort and reduces the risk for condom breakage. Use only water-based lubricants. Don’t use oil, lotion, or petroleum jelly. They can weaken the condom, causing breakage. Also, you may want to choose lubricants without nonoxynol-9. This spermicide may cause irritation. It can raise the risk for certain STIs.  Use condoms correctly  For condoms to work, they must be used the right way. Keep these tips in mind:  Use a new latex condom each time you have sex. Slip the condom on the penis before any contact is made.  When ready to withdraw, hold the rim of the condom as the  penis pulls out. This prevents the condom from slipping off.  Check the expiration date before using a condom.  Don’t store condoms in places that can get hot, such as a car or a wallet that is carried in a back pocket.  Get to know your partner  Safer sex is a process. It involves getting to know your partner and making informed choices. Ask each other how many partners you have had in the past, and how many you have now. Find out if either of you has HIV or any other STI. If you decide to have sex, use a condom each time. Don’t stop using condoms unless you’re sure neither of you has other partners and you’ve both been tested to confirm you don’t have HIV or other STIs. Then stay free of disease by having sex only with each other (monogamy).  Keep your cool  Don’t let alcohol or drugs cloud your judgment. They could lead you to have sex with someone you wouldn’t have chosen if you were sober. Or you might forget to use a condom. If you do plan to have sex, keep a latex condom with you. Don’t wait until you’re in the heat of passion to try to find one.  Consider abstinence  The only way to be sure you won’t get an STI is to abstain from sex. Abstinence is a choice that many people make at some point in their life. Maybe you want to wait until you are sure you’re ready before you have sex. Maybe you’d like a break from the responsibilities of sex for a while. Or maybe you just want to know your partner better before taking the next step. Abstinence is a choice you can make now to protect your future.  [employer] last reviewed this educational content on 12/1/2018 © 2000-2020 The [employer]. [address] [place] [address], [place] [address]. All rights reserved. This information is not intended as a substitute for professional medical care. Always follow your healthcare professional's instructions.        Understanding Body Mass Index (BMI)   Body mass index (BMI) is a way to figure out your weight category. It  uses the ratio of your height to your weight. The BMI is a measure of your weight that is corrected for height. Knowing your BMI is a way to tell if you are at a healthy weight, are underweight, or overweight. The higher your BMI, the greater your risk for weight-related health problems.  What BMI means for adults  BMI below 18.5: Underweight  BMI 18.5 to 24.9: Healthy weight or ideal body weight   BMI 25 to 29.9: Overweight  BMI 30 and over: Obese  BMI 40 and over: Severe obesity        Find your BMI with an online BMI calculator tool, such as these from the CDC:  BMI calculator for adults  BMI calculator for children and teens  Using the BMI chart  To figure out your BMI, find your height and weight (or the numbers closest to them) on the table below. Follow each column of numbers to where your height and weight meet on the table. That is your BMI.    [employer]Well last reviewed this educational content on 9/1/2019  © 0653-9818 The [employer]. [address] [place] [address], [place] [address]. All rights reserved. This information is not intended as a substitute for professional medical care. Always follow your healthcare professional's instructions.        Obesity and Its Impact on Health  Obesity is a major health problem in the [place] and all over the world. It affects nearly 2 out of 5 [place] adults.  What is obesity?  Obesity is a term used to describe a body weight that is above a normal weight for a specific height. Obesity is measured by using BMI (body mass index). BMI is a formula that uses a person’s weight divided by their height. BMI may be used to screen for weight problems but it’s important to know that BMI does not diagnose health problems or a person’s body fat. A high BMI number can mean high body fat. A BMI between 18.5 and 25 is normal. A BMI between 25 and 30 falls within the overweight range. A BMI 30 or higher is within the obese range. A BMI of 40 or more is considered extreme or severe  obesity.  Why is obesity a problem?  Carrying too much weight can increase your risk for many serious health issues. These include problems with your heart, lungs, blood vessels, brain, and joints. Some of the problems that can happen include:  Heart and circulation problems. These include heart disease, high blood pressure, heart rhythm problems (atrial fibrillation), and stroke  Type 2 diabetes  Certain cancers, such as colon and breast cancer  Sleep apnea and other breathing problems.  Back or joint problems, such as osteoarthritis and gout  Digestive tract problems such as gallstones and GERD (gastroesophageal reflux disease)  Depression (with severe obesity)  Carrying too much weight can also affect your quality of life. And it can keep you from doing things you want or need to do.  How can you lower your risk for problems?  The key to lowering your risk for health problems from obesity is to manage your weight. If you are overweight or obese, the first step is to lose weight. Studies show that losing as little as 5 percent of your body weight is good for your health.  An important part of losing weight involves developing health habits and behaviors. Here are some tips:  Control how much you eat. Food is your body’s way to get energy (calories). But if you take in more calories than your body uses, you’ll gain weight. Know your eating habits and keep your portions reasonable.  Make healthy eating choices. Choose foods with many nutrients that give your body the energy it needs without adding extra pounds (kilograms). Also limit foods with added sugar and fats.  Be more active. Exercise burns calories, which can help you manage your weight. Increase your daily movement, add aerobic activity, and include strength training.  Talk with your healthcare provider. Ask about working with a dietitian, health , exercise physiologist, or mental health provider who can support and encourage you.     Adding exercise  to your day can help you control your weight.        If you have trouble losing weight, your healthcare provider may suggest medicines to help you. Weight loss (bariatric) surgery may also be an option for adults who have:  A BMI of 40 or more, or who are more than 100 pounds (45.4 kg) overweight  A BMI of 35 to less than 40 and a serious health problem such as type 2 diabetes, high blood pressure, or sleep apnea.  Not been able to stay at a healthy weight for a period of time in spite of efforts to lose weight through diet, exercise, or medicines  StayWell last reviewed this educational content on 3/1/2020  © 5768-5408 The [employer]. [address] [place] [address], [place] [address]. All rights reserved. This information is not intended as a substitute for professional medical care. Always follow your healthcare professional's instructions.        Exercise: Making the Most of Your Time  Your exercise goal is 30 minutes on most days. Aim for a total of 150 or more minutes a week. Not sure you can fit one 30-minute block of exercise time into your day? Split it up into shorter 10- and 15-minute blocks. Do this 2 to 3 times a day. You'll still get all of the benefits of exercise.    Use your whole body  Aerobic exercise works your heart and lungs. Some examples are walking, running, and cycling. Try adding a few other activities, too. This can help you strengthen and stretch many different muscles in your body.  Strengthen your:  Lower legs. Go up and down slowly on your toes, while you’re filing papers or washing dishes.  Upper legs. Lower yourself slowly into a chair without using your arms.  Stretch your:  Back. After you get up from a chair, place your palms on your low back and lean your upper body back.  Shoulders and chest. Lift your arms overhead and reach tall while waiting for your computer to warm up.  Lower legs. Raise your toes and press them against a wall (with your heel on the ground).  Find a few  extra minutes  Try these tips to add some extra exercise and activity to your day:  At work. Pick a lunch spot a few blocks away and walk there and back. Take a brisk walk on your break.  At home. Ride bikes with your kids. Use an exercise bike in the living room while you watch TV.  On errands. Park a few blocks away from where you need to go and walk there. Power-walk in malls by doing a fast lap or two before shopping.  At play. Go hiking with friends instead of sitting on a bench. Walk through a street fair instead of sitting in a movie.  Tips for sticking with it  Plan your activity by writing it on a calendar.  Find a austin at work to walk with during a lunch break.  Take your kids with you on a short walk after dinner.  Post a reminder list of the benefits of being active where you can see it.  Set an alarm to tell you when it’s time for an activity break.   [employer] last reviewed this educational content on 3/1/2018  © 3510-4586 The [employer]. [address] [place] [address]. All rights reserved. This information is not intended as a substitute for professional medical care. Always follow your healthcare professional's instructions.        4 Steps for Eating Healthier  Changing the way you eat can improve your health. It can lower your cholesterol and blood pressure, and help you stay at a healthy weight. Your diet doesn’t have to be bland and boring to be healthy. Just watch your calories and follow these steps:    Step 1. Eat fewer unhealthy fats  Choose more fish and lean meats instead of fatty cuts of meat.  Skip butter and lard, and use less margarine.  Pass on foods that have palm, coconut, or hydrogenated oils.  Eat fewer high-fat dairy foods like cheese, ice cream, and whole milk.  Get a heart-healthy cookbook and try some low-fat recipes.    Step 2. Go light on salt  Keep the saltshaker off the table.  Limit high-salt ingredients, such as soy sauce, bouillon, and garlic  salt.  Instead of adding salt when cooking, season your food with herbs and flavorings. Try lemon, garlic, and onion, or salt-free herb seasonings.  Limit convenience foods, such as boxed or canned foods and restaurant food.  Read food labels and choose lower-sodium options.    Step 3. Limit sugar  Pause before you add sugars to pancakes, cereal, coffee, or tea. This includes white and brown table sugar, syrup, honey, and molasses. Cut your usual amount by half.  Use non-sugar sweeteners. Stevia, aspartame, and sucralose can satisfy a sweet tooth without adding calories.  Swap out sugar-filled soda and other drinks. Buy sugar-free or low-calorie beverages. Remember water is always the best choice.  Read labels and choose foods with less added sugar. Keep in mind that dairy foods and foods with fruit will have some natural sugar.  Cut the sugar in recipes by 1/3 to 1/2. Boost the flavor with extracts like almond, vanilla, or orange. Or add spices such as cinnamon or nutmeg.    Step 4. Eat more fiber  Eat fresh fruits and vegetables every day.  Boost your diet with whole grains. Go for oats, whole-grain rice, and bran.  Add beans and lentils to your meals.  Drink more water to match your fiber increase to help prevent constipation.    [employer] last reviewed this educational content on 6/1/2017  © 1113-0419 The [employer]. [address] [place] [address], [place] [address]. All rights reserved. This information is not intended as a substitute for professional medical care. Always follow your healthcare professional's instructions.        Eating Healthy on the Run     Many grocery stores stock pre-made salads for eating on the run.     Need to eat on the run? This often means grabbing \"junk\" or fast food full of fat, salt, sugar, and cholesterol. But being in a rush doesn't mean that you can't eat healthy.  \"Fast\" food made healthy  Try these ways to get good nutrition, fast.  Go to a grocery or convenience market  instead of a fast-food restaurant. Look for choices like sandwiches, yogurt, fresh fruit, and juices.  Buy precut, prepackaged fresh or frozen fruits and vegetables. You can use them for a snack, salad, smoothie, or stir-dockery.  Microwave a frozen dinner that has less than 15 grams (g) of fat and less than 800 milligrams (mg) of sodium. Complete the meal with a whole-grain roll, vegetables, and fresh fruit.  If you must have fast food, consider your options. Go for veggie burgers, broiled and skinless chicken breast sandwiches, or dinner salads with low-fat dressing. Avoid large (super-sized) portions.  Blot the extra oil from food with a napkin before you eat it.  Instead of french fries, choose a baked potato with salsa.  Tip  Fast-food restaurants often have printed nutrition information available. Ask for this information and look up your favorite items before you order.   [employer] last reviewed this educational content on 6/1/2017 © 2000-2020 The [employer]. [address] [place] [address]. All rights reserved. This information is not intended as a substitute for professional medical care. Always follow your healthcare professional's instructions.        Eating Healthy: Good Nutrition Quiz  To test your nutrition knowledge, answer the questions below as either True or False.     True False    []  []  1. There are many non-dairy sources of calcium.   []  []  2. Low-fat foods are always better.   []  []  3. Fiber isn’t important.   []  []  4. You need to watch portion sizes only when eating at home.   []  []  5. The outside aisles of the grocery store are the best places to shop.       Answers  TRUE. While dairy products have the most calcium, you can also get this mineral from other foods, too. Try calcium-fortified juices, cereals, breads, rice milk, or almond milk. Other sources of calcium are canned fish, some beans, and dark, leafy greens. Soybeans and some soy items are also good  options. These include soy yogurt, tempeh, and tofu made with calcium sulfate.  FALSE. Just because a food is low-fat or fat-free does not mean it’s always a better nutritional choice. For instance, fat-free cookies have the same amount of sugar and calories, or often even more, than regular cookies. Read labels.  FALSE. High-fiber foods help keep your digestive system healthy. Try to get 25 to 38 grams of fiber a day. Also, remember to drink plenty of water to prevent constipation.  FALSE. Portion control is just as important when you’re eating out. Many restaurants serve extra-large portions. So split your entree with someone at the table. Or ask for a take-home box. Then put half of your entree in it right away, before you start eating.  TRUE. This is where the fresh foods tend to be. These include fruits, vegetables, grains, and dairy. Processed foods are more likely to be on the inside aisles. When shopping these aisles, read labels extra carefully.  [employer] last reviewed this educational content on 6/1/2017  © 8788-1196 [employer]. [address] [place] [address]. All rights reserved. This information is not intended as a substitute for professional medical care. Always follow your healthcare professional's instructions. Prevention Guidelines, Women Ages 40 to 49  Screening tests and vaccines are an important part of managing your health. A screening test is done to find diseases in people who don't have any symptoms. The goal is to find a disease early so lifestyle changes and checkups can reduce the risk of disease. Or the goal may be to detect it early to treat it most effectively. Screening tests are not used to diagnose a disease. But they are used to see if more testing is needed. Health counseling is important, too. Below are guidelines for these, for women ages 40 to 49. Talk with your healthcare provider to make sure you’re up-to-date on what you need.  Screening Who needs it  How often   Type 2 diabetes or prediabetes All women beginning at age 45 and women without symptoms at any age who are overweight or obese and have 1 or more additional risk factors for diabetes At least every 3 years1   Type 2 diabetes or prediabetes All women diagnosed with gestational diabetes Lifelong testing every 3 years   Type 2 diabetes All women with prediabetes Every year   Alcohol misuse All women in this age group At routine exams   Blood pressure All women in this age group Yearly checkup if your blood pressure is normal  Normal blood pressure is less than 120/80 mm Hg  If your blood pressure reading is higher than normal, follow the advice of your healthcare provider   Breast cancer All women at average risk in this age group Screening with a mammogram can start at age 40.2 Talk with your healthcare provider to help you decide when to start screening. At age 45 start yearly mammograms.3   Cervical cancer All women in this age group, except women who have had a complete hysterectomy Pap test every 3 years or Pap test plus human papilloma virus (HPV) test every 5 years   Colorectal cancer Women age 45 years and older at average risk Multiple tests are available and are used at different times. Possible tests include:  Flexible sigmoidoscopy every 5 years, or  Colonoscopy every 10 years, or  CT colonography (virtual colonoscopy) every 5 years, or  Yearly fecal occult blood test, or  Yearly fecal immunochemical test every year, or  Stool DNA test, every 3 years  If you choose a test other than a colonoscopy and have an abnormal test result, you will need to follow-up with a colonoscopy. Screening advice varies among expert groups. Talk with your healthcare provider about which tests are best for you.  Some people should be screened using a different schedule because of their personal or family health history. Talk with your healthcare provider about your health history.   Chlamydia Women at increased risk  for infection At routine exams if you're at risk or have symptoms   Depression All women in this age group At routine exams   Gonorrhea Sexually active women at increased risk for infection At routine exams   Hepatitis C Anyone at increased risk; 1 time for those born between 1945 and 1965 At routine exams   High cholesterol or triglycerides All women ages 45 and older who are at risk for coronary artery disease; younger women, talk with your healthcare provider At least every 5 years   HIV All women At routine exams. Those with risk factors for HIV should be tested at least annually.   Obesity All women in this age group At routine exams   Syphilis Women at increased risk for infection-talk with your healthcare provider At routine exams   Tuberculosis Women at increased risk for infection-talk with your healthcare provider Ask your healthcare provider   Vision All women in this age group Complete exam at age 40 and eye exams every 2 to 4 years. If you have a chronic disease, ask your healthcare provider how often you should have your eyes examined.4   Vaccine Who needs it How often   Chickenpox (varicella) All women in this age group who have no record of this infection or vaccine 2 doses; the second dose should be given at least 4 weeks after the first dose   Hepatitis A Women at increased risk for infection-talk with your healthcare provider 2 doses given 6 months apart   Hepatitis B Women at increased risk for infection-talk with your healthcare provider 3 doses over 6 months; second dose should be given 1 month after the first dose; the third dose should be given at least 2 months after the second dose and at least 4 months after the first dose   Haemophilus influenzae Type B (HIB) Women at increased risk 1 to 3 doses   Influenza (flu) All women in this age group Once a year   Measles, mumps, rubella (MMR) All women in this age group who have no record of these infections or vaccines 1 or 2 doses    Meningococcal Women at increased risk for infection-talk with your healthcare provider 1 or more doses   Pneumococcal conjugate vaccine (PCV13) and pneumococcal polysaccharide vaccine (PPSV23) Women at increased risk for infection-talk with your healthcare provider 1 or 2 doses   Tetanus/diphtheria/pertussis (Td/Tdap) booster All women in this age group A 1-time dose of Tdap instead of a Td booster after age 18, then Td every 10 years   Counseling Who needs it How often   BRCA gene mutation testing for breast and ovarian cancer susceptibility Women with increased risk for having gene mutation When your risk is known   Breast cancer and chemoprevention Women at high risk for breast cancer When your risk is known   Diet and exercise Women who are overweight or obese When diagnosed, and then at routine exams   Domestic violence Women at the age in which they are able to have children At routine exams   Sexually transmitted infection prevention Women at increased risk for infection-talk with your healthcare provider At routine exams   Use of tobacco and the health effects it can cause All women in this age group Every exam   1 American Diabetes Association  2 American College of Obstetricians and Gynecologists   3 American Cancer Society  4 American Academy of Ophthalmology  [employer] last reviewed this educational content on 11/1/2017  © 0584-9982 [employer]. All rights reserved. This information is not intended as a substitute for professional medical care. Always follow your healthcare professional's instructions.

## 2024-05-17 NOTE — PROGRESS NOTES
Chief Complaint   Patient presents with    Physical     Pt states that her vag is swollen, itchy, and her discharge is a different color           HPI:   Elizabeth is 38 year old , here today for concern of vaginal itching, swollen. Patient also notices a slight white discharge. Denies odor. Patient reports she was using vagisil anti-itch wipes and thinks she may have injured her tissues because she noticed some blood when wiping. Patient reports this happened once before and it lasted a few days then went away. Patient does report burning with urination and has a history of UTIs. Does report vaginal dryness with intercourse- uses lubrication.    Patient reports being sexually active with one male partner. Reports being in safe relationship. Declines STI testing today. Does not want GC/CT testing for aid in diagnosis as she does not feel she needs it.    Patient has hx of hysterectomy and oophorectomy in  for endometrial cancer. Not on HRT due to increased risk of CA per Dr Blair.    Last pap 2015 NILM    She is requesting note for work that she missed today due to this concern and related appointment.    Ly GUERRIER and Lynn Olivarez present for physical exam    Patient Active Problem List   Diagnosis    Essential hypertension with goal blood pressure less than 140/90    Morbid obesity (HCC)    Gastroesophageal reflux disease    Hemorrhoids    Other headache syndrome    Leg numbness    Pre-diabetes    Morbid obesity with BMI of 40.0-44.9, adult (HCC)    Stress    Obesity (BMI 30-39.9)    Type 2 diabetes mellitus without complication, without long-term current use of insulin (HCC)    Major depressive disorder, recurrent, moderate (HCC)    Motion sickness    Vitamin D deficiency        Note: This is a gyn only visit. Pt has PCP and is referred back to PCP for care of any non-gyn concerns listed above in the Problem List.    Medications (Active prior to today's visit):  Current Outpatient Medications    Medication Sig Dispense Refill    Cariprazine HCl (VRAYLAR) 1.5 MG Oral Cap Take 1.5 mg by mouth once daily.      semaglutide 2 MG/3ML Subcutaneous Solution Pen-injector Inject 0.5 mg into the skin once a week. 3 each 0    DULoxetine 30 MG Oral Cap DR Particles Take 2 capsules (60 mg total) by mouth daily.      fenofibrate 145 MG Oral Tab Take 1 tablet (145 mg total) by mouth daily. 90 tablet 3    lisinopril-hydroCHLOROthiazide 20-25 MG Oral Tab TAKE ONE TABLET BY MOUTH ONE TIME DAILY. 90 tablet 3    Cholecalciferol (VITAMIN D) 50 MCG (2000 UT) Oral Tab Take by oral route 2000 units daily per 30 tablet 0    FreeStyle Lancets Does not apply Misc Test blood sugar daily as directed 100 each 3    Glucose Blood (FREESTYLE TEST) In Vitro Strip Test blood sugar daily as directed 100 strip 3    FreeStyle System Does not apply Kit Test blood sugar daily as directed 1 each 0    OMEPRAZOLE 20 MG Oral Capsule Delayed Release TAKE ONE CAPSULE BY MOUTH IN THE MORNING BEFORE BREAKFAST 90 capsule 0       Allergies:  No Known Allergies    ROS:  Review of Systems   Constitutional: Negative.    Respiratory: Negative.     Gastrointestinal: Negative.    Endocrine: Negative.    Genitourinary:  Positive for dysuria, vaginal discharge and vaginal pain (itching). Negative for difficulty urinating, dyspareunia, frequency, menstrual problem, pelvic pain and urgency.   Neurological: Negative.    Psychiatric/Behavioral: Negative.         PHYSICAL EXAM:  Vitals:    05/17/24 1412   BP: 127/88   Pulse: 96     Physical Exam  Vitals reviewed.   Constitutional:       Appearance: Normal appearance.   HENT:      Head: Normocephalic.   Pulmonary:      Effort: Pulmonary effort is normal.   Genitourinary:     Vagina: No vaginal discharge (dry).          Comments: Red dot indicates location of fissure  Generalized erythema noted  Cervix surgically absent  Neurological:      Mental Status: She is alert and oriented to person, place, and time.    Psychiatric:         Behavior: Behavior normal. Behavior is cooperative.         Thought Content: Thought content normal.         Judgment: Judgment normal.           ASSESSMENT/PLAN:     Elizabeth was seen today for physical.    Diagnoses and all orders for this visit:    Encounter for gynecological examination with abnormal finding    Vaginitis and vulvovaginitis  -     Vaginitis Vaginosis PCR Panel; Future  -     HSV 1/2 Subtype by PCR (Lesion only) [E]; Future  -     HSV 1/2 Subtype by PCR (Lesion only) [E]  -     Vaginitis Vaginosis PCR Panel    Dysuria  -     Urine Culture, Routine; Future    Menopause        - Use of lubrication and vaginal moisturizers PRN    Pap smear for cervical cancer screening  -     ThinPrep PAP Smear; Future  -     Hpv Dna  High Risk , Thin Prep Collect; Future  -     ThinPrep PAP Smear  -     Hpv Dna  High Risk , Thin Prep Collect    Routine screening for STI (sexually transmitted infection)  -     Vaginitis Vaginosis PCR Panel; Future  -     HSV 1/2 Subtype by PCR (Lesion only) [E]; Future  -     HSV 1/2 Subtype by PCR (Lesion only) [E]  -     Vaginitis Vaginosis PCR Panel    Other orders  -     [facility] 2D+3D SCREENING BILAT (CPT=77067/06596); Future       Note provided for missed work today  Will treat if indicated when labs resulted    Follow-up/Return to clinic: PRN for gynecological concerns    Counseling:   Best hygiene practices: use unscented soap or just plain water to wash vaginal tissues. Use unscented laundry detergent. Wear cotton only underwear and avoid pads.  Use gentle lubricants such as coconut oil, uber lube or Replens as needed for sexual intercourse and vaginal moisturizers daily to help with vaginal dryness  Avoiding high sugar diets and maintaining blood sugar can help prevent vaginal yeast infections  Frequency of health screening and personal risks  Pap,  mammography    Patient verbalized understanding, All questions answered. No barriers to learning  identified    Assessment and Plan Completed by Ly GUERRIER under the direct supervision of Lynn Olivarez CNM    Patient seen in conjunction with SN. I personally witnessed the patient's exam and medical decision making on this date of service.  I was physically present in the room for the performance of the E/M service.  I have reviewed the [employer]ACEK student's documentation and findings including history, Exam, and Medical Decision Making, edited the document for accuracy and verify that it represents the clinical findings and services performed.

## 2024-05-18 LAB
BV BACTERIA DNA VAG QL NAA+PROBE: NEGATIVE
C GLABRATA DNA VAG QL NAA+PROBE: NEGATIVE
C KRUSEI DNA VAG QL NAA+PROBE: NEGATIVE
CANDIDA DNA VAG QL NAA+PROBE: POSITIVE
T VAGINALIS DNA VAG QL NAA+PROBE: NEGATIVE

## 2024-05-20 LAB — HPV I/H RISK 1 DNA SPEC QL NAA+PROBE: NEGATIVE

## 2024-05-21 ENCOUNTER — TELEPHONE (OUTPATIENT)
Dept: OBGYN CLINIC | Facility: CLINIC | Age: 39
End: 2024-05-21

## 2024-05-21 DIAGNOSIS — B37.9 CANDIDA INFECTION: Primary | ICD-10-CM

## 2024-05-21 NOTE — TELEPHONE ENCOUNTER
Patient has questions regarding her test results from last week's visit and the next course of action. Please advise.

## 2024-05-22 LAB
HSV 1 NAA: NEGATIVE
HSV 1 NAA: NEGATIVE
HSV 2 NAA: NEGATIVE
HSV 2 NAA: NEGATIVE

## 2024-05-23 LAB
.: NORMAL
.: NORMAL

## 2024-06-12 ENCOUNTER — OFFICE VISIT (OUTPATIENT)
Dept: INTERNAL MEDICINE CLINIC | Facility: CLINIC | Age: 39
End: 2024-06-12
Payer: COMMERCIAL

## 2024-06-12 ENCOUNTER — NURSE TRIAGE (OUTPATIENT)
Dept: INTERNAL MEDICINE CLINIC | Facility: CLINIC | Age: 39
End: 2024-06-12

## 2024-06-12 VITALS
SYSTOLIC BLOOD PRESSURE: 123 MMHG | DIASTOLIC BLOOD PRESSURE: 80 MMHG | BODY MASS INDEX: 35.36 KG/M2 | WEIGHT: 247 LBS | HEIGHT: 70 IN | TEMPERATURE: 99 F | HEART RATE: 94 BPM

## 2024-06-12 DIAGNOSIS — H92.01 RIGHT EAR PAIN: ICD-10-CM

## 2024-06-12 DIAGNOSIS — K21.9 GASTROESOPHAGEAL REFLUX DISEASE, UNSPECIFIED WHETHER ESOPHAGITIS PRESENT: ICD-10-CM

## 2024-06-12 DIAGNOSIS — R22.0 FACIAL SWELLING: Primary | ICD-10-CM

## 2024-06-12 PROCEDURE — 99213 OFFICE O/P EST LOW 20 MIN: CPT | Performed by: NURSE PRACTITIONER

## 2024-06-12 PROCEDURE — 3008F BODY MASS INDEX DOCD: CPT | Performed by: NURSE PRACTITIONER

## 2024-06-12 PROCEDURE — 3074F SYST BP LT 130 MM HG: CPT | Performed by: NURSE PRACTITIONER

## 2024-06-12 PROCEDURE — 3079F DIAST BP 80-89 MM HG: CPT | Performed by: NURSE PRACTITIONER

## 2024-06-12 RX ORDER — AMOXICILLIN AND CLAVULANATE POTASSIUM 875; 125 MG/1; MG/1
1 TABLET, FILM COATED ORAL 2 TIMES DAILY
Qty: 20 TABLET | Refills: 0 | Status: SHIPPED | OUTPATIENT
Start: 2024-06-12 | End: 2024-06-22

## 2024-06-12 RX ORDER — OMEPRAZOLE 20 MG/1
CAPSULE, DELAYED RELEASE ORAL
Qty: 90 CAPSULE | Refills: 0 | Status: SHIPPED | OUTPATIENT
Start: 2024-06-12

## 2024-06-12 NOTE — PROGRESS NOTES
HPI:    Patient ID: Elizabeth Tsai is a 38 year old female.    HPI Sore Throat and Right ear pain  It started with a sore throat Sunday night.  Today throat feels better but she now has right ear pain.    Sore throat pain started Sunday  On Monday she had flu symptoms, fever, diarrhea    Right Facial Swelling  No dental issues at this time  Visible right sided facial swelling  Immunization History   Administered Date(s) Administered    Covid-19 Vaccine Moderna 50 Mcg/0.25 Ml 01/16/2022    Covid-19 Vaccine Moderna Bivalent 50mcg/0.5mL 12+ years 09/03/2022    Covid-19 Vaccine Pfizer 30 mcg/0.3 ml 02/05/2021, 02/26/2021    FLUZONE 6 months and older PFS 0.5 ml (34289) 11/30/2016, 11/15/2017, 09/13/2018, 11/14/2019    Influenza 11/04/2017    Pfizer Covid-19 Vaccine 30mcg/0.3ml 12yrs+ ([id number]) 12/10/2023       Past Medical History:    ADHD    Anxiety    Cancer (HCC)    endometrial, s/p robotic hyst/BSO    Depression    Diabetes (HCC)    Hemorrhoid    Morbid obesity with BMI of 40.0-44.9, adult (HCC)    Obesity, unspecified    Pre-diabetes    Unspecified essential hypertension      Past Surgical History:   Procedure Laterality Date    Hysterectomy  2013    Tonsillectomy        Social History     Socioeconomic History    Marital status:    Tobacco Use    Smoking status: Never    Smokeless tobacco: Never   Vaping Use    Vaping status: Never Used   Substance and Sexual Activity    Alcohol use: Not Currently     Comment: rarely    Drug use: No   Other Topics Concern    Caffeine Concern Yes     Comment: Chocolate daily;           Review of Systems   Constitutional:  Negative for chills, fatigue and fever.   HENT:  Positive for ear pain and sore throat. Negative for congestion, ear discharge, facial swelling, hearing loss, postnasal drip, sinus pressure and trouble swallowing.         Right facial swelling   Eyes:  Negative for pain, discharge, redness and visual disturbance.   Respiratory:  Negative for cough,  chest tightness, shortness of breath and wheezing.    Cardiovascular:  Negative for chest pain, palpitations and leg swelling.   Gastrointestinal:  Negative for abdominal distention, abdominal pain, constipation, diarrhea, nausea and vomiting.   Endocrine: Negative for cold intolerance, heat intolerance, polydipsia, polyphagia and polyuria.   Genitourinary:  Negative for difficulty urinating, dysuria, pelvic pain and vaginal bleeding.   Musculoskeletal:  Negative for back pain, gait problem, neck pain and neck stiffness.   Skin:  Negative for color change and rash.   Neurological:  Negative for dizziness, seizures, weakness and headaches.   Psychiatric/Behavioral:  Negative for agitation and sleep disturbance. The patient is not nervous/anxious.               Current Outpatient Medications   Medication Sig Dispense Refill    omeprazole 20 MG Oral Capsule Delayed Release TAKE ONE CAPSULE BY MOUTH IN THE MORNING BEFORE BREAKFAST 90 capsule 0    amoxicillin clavulanate 875-125 MG Oral Tab Take 1 tablet by mouth 2 (two) times daily for 10 days. 20 tablet 0    Cariprazine HCl (VRAYLAR) 1.5 MG Oral Cap Take 1.5 mg by mouth once daily.      DULoxetine 30 MG Oral Cap DR Particles Take 2 capsules (60 mg total) by mouth daily.      fenofibrate 145 MG Oral Tab Take 1 tablet (145 mg total) by mouth daily. 90 tablet 3    lisinopril-hydroCHLOROthiazide 20-25 MG Oral Tab TAKE ONE TABLET BY MOUTH ONE TIME DAILY. 90 tablet 3    Cholecalciferol (VITAMIN D) 50 MCG (2000 UT) Oral Tab Take by oral route 2000 units daily per 30 tablet 0    FreeStyle Lancets Does not apply Misc Test blood sugar daily as directed 100 each 3    Glucose Blood (FREESTYLE TEST) In Vitro Strip Test blood sugar daily as directed 100 strip 3    FreeStyle System Does not apply Kit Test blood sugar daily as directed 1 each 0    semaglutide 2 MG/3ML Subcutaneous Solution Pen-injector Inject 0.5 mg into the skin once a week. (Patient not taking: Reported on  6/12/2024) 3 each 0     Allergies:No Known Allergies   PHYSICAL EXAM:   Physical Exam  Constitutional:       Appearance: Normal appearance. She is well-developed.   HENT:      Head: Normocephalic.      Comments: Swelling over the [place] (Right)     Left Ear: Tympanic membrane normal.      Ears:      Comments: Right ear with erythema     Nose: Nose normal.      Mouth/Throat:      Mouth: Mucous membranes are moist.      Pharynx: No oropharyngeal exudate or posterior oropharyngeal erythema.   Eyes:      General:         Right eye: No discharge.         Left eye: No discharge.      Pupils: Pupils are equal, round, and reactive to light.   Cardiovascular:      Rate and Rhythm: Normal rate and regular rhythm.      Heart sounds: Normal heart sounds. No murmur heard.     No friction rub. No gallop.   Pulmonary:      Effort: Pulmonary effort is normal. No respiratory distress.      Breath sounds: Normal breath sounds. No wheezing, rhonchi or rales.   Abdominal:      General: Bowel sounds are normal. There is no distension.      Palpations: Abdomen is soft. There is no mass.      Tenderness: There is no abdominal tenderness. There is no right CVA tenderness, left CVA tenderness or guarding.   Musculoskeletal:         General: No tenderness.      Cervical back: Normal range of motion and neck supple. No tenderness.      Right lower leg: No edema.      Left lower leg: No edema.   Lymphadenopathy:      Cervical: No cervical adenopathy.   Skin:     General: Skin is warm and dry.      Findings: No rash.   Neurological:      Mental Status: She is alert and oriented to person, place, and time.      Coordination: Coordination normal.      Gait: Gait normal.   Psychiatric:         Mood and Affect: Mood normal.         Behavior: Behavior normal.         Thought Content: Thought content normal.         Judgment: Judgment normal.       /80 (BP Location: Right arm, Patient Position: Sitting, Cuff Size: adult)   Pulse 94   Temp  98.8 °F (37.1 °C)   Ht 5' 10\" (1.778 m)   Wt 247 lb (112 kg)   BMI 35.44 kg/m²   Wt Readings from Last 2 Encounters:   06/12/24 247 lb (112 kg)   05/17/24 247 lb 9.6 oz (112.3 kg)     Body mass index is 35.44 kg/m².(2)  Lab Results   Component Value Date    WBC 7.4 11/14/2023    RBC 4.64 11/14/2023    HGB 13.5 11/14/2023    HCT 39.3 11/14/2023    MCV 84.7 11/14/2023    MCH 29.1 11/14/2023    MCHC 34.4 11/14/2023    RDW 12.8 11/14/2023    .0 11/14/2023    MPV 7.9 12/12/2018      Lab Results   Component Value Date     (H) 02/05/2024    BUN 12 02/05/2024    BUNCREA 14.8 02/05/2024    CREATSERUM 0.81 02/05/2024    ANIONGAP 6 02/05/2024    GFRNAA 103 05/06/2022    GFRAA 119 05/06/2022    CA 10.1 02/05/2024    OSMOCALC 289 02/05/2024    ALKPHO 51 08/18/2023    AST 39 (H) 08/18/2023    ALT 51 08/18/2023    ALKPHOS 64 11/18/2015    BILT 0.8 08/18/2023    TP 8.1 08/18/2023    ALB 4.1 08/18/2023    GLOBULIN 4.0 08/18/2023    AGRATIO 1.3 11/18/2015     02/05/2024    K 4.0 02/05/2024     02/05/2024    CO2 29.0 02/05/2024      Lab Results   Component Value Date     (H) 01/29/2024    A1C 7.6 (H) 01/29/2024      Lab Results   Component Value Date    CHOLEST 133 05/06/2022    TRIG 559 (H) 05/06/2022    HDL 27 (L) 05/06/2022    LDL 29 05/06/2022    VLDL 74 (H) 05/06/2022    NONHDLC 106 05/06/2022    CALCNONHDL 94 11/18/2015      Lab Results   Component Value Date    T4F 0.93 09/20/2012    TSH 1.560 08/18/2023                ASSESSMENT/PLAN:     Problem List Items Addressed This Visit       Right ear pain     Right ear pain.   Right facial swelling     Plan  amoxicillin clavulanate 875-125 MG Oral Tab          Take 1 tablet by mouth 2 (two) times daily for 10 days., Normal, Disp-20 tablet, R-0              Refer to ENT for further evaluation         Gastroesophageal reflux disease     Refilled her omeprazole         Relevant Medications    omeprazole 20 MG Oral Capsule Delayed Release     amoxicillin clavulanate 875-125 MG Oral Tab     Other Visit Diagnoses       Facial swelling    -  Primary    Relevant Orders    ENT Referral - [facility])               No orders of the defined types were placed in this encounter.      Meds This Visit:  Requested Prescriptions     Signed Prescriptions Disp Refills    omeprazole 20 MG Oral Capsule Delayed Release 90 capsule 0     Sig: TAKE ONE CAPSULE BY MOUTH IN THE MORNING BEFORE BREAKFAST    amoxicillin clavulanate 875-125 MG Oral Tab 20 tablet 0     Sig: Take 1 tablet by mouth 2 (two) times daily for 10 days.       Imaging & Referrals:  ENT - INTERNAL         MINA Petty

## 2024-06-12 NOTE — TELEPHONE ENCOUNTER
Action Requested: Summary for Provider     []  Critical Lab, Recommendations Needed  [] Need Additional Advice  []   FYI    []   Need Orders  [] Need Medications Sent to Pharmacy  []  Other     SUMMARY: Patient will keep in person appointment for today.    Patient asking if she could change her appointment to video, has sore throat x few days. History of recent sore throats. No other symptoms. Patient advised to keep appointment, may need strep culture. Patient verbalized understanding/    Reason for call: Condition Update and Sore Throat      Reason for Disposition   Patient requesting a strep throat test    Protocols used: Sore Throat-A-OH

## 2024-06-12 NOTE — ASSESSMENT & PLAN NOTE
Right ear pain.   Right facial swelling     Plan  amoxicillin clavulanate 875-125 MG Oral Tab          Take 1 tablet by mouth 2 (two) times daily for 10 days., Normal, Disp-20 tablet, R-0              Refer to ENT for further evaluation

## 2024-06-20 ENCOUNTER — OFFICE VISIT (OUTPATIENT)
Dept: DERMATOLOGY CLINIC | Facility: CLINIC | Age: 39
End: 2024-06-20

## 2024-06-20 ENCOUNTER — TELEPHONE (OUTPATIENT)
Dept: DERMATOLOGY CLINIC | Facility: CLINIC | Age: 39
End: 2024-06-20

## 2024-06-20 DIAGNOSIS — L30.1 DYSHIDROTIC ECZEMA: ICD-10-CM

## 2024-06-20 DIAGNOSIS — B07.9 VERRUCA(E): ICD-10-CM

## 2024-06-20 DIAGNOSIS — L73.8 SEBACEOUS HYPERPLASIA: Primary | ICD-10-CM

## 2024-06-20 PROCEDURE — 99213 OFFICE O/P EST LOW 20 MIN: CPT | Performed by: PHYSICIAN ASSISTANT

## 2024-06-20 PROCEDURE — 17110 DESTRUCTION B9 LES UP TO 14: CPT | Performed by: PHYSICIAN ASSISTANT

## 2024-06-20 RX ORDER — MOMETASONE FUROATE 1 MG/G
1 CREAM TOPICAL DAILY
Qty: 45 G | Refills: 0 | Status: SHIPPED | OUTPATIENT
Start: 2024-06-20

## 2024-06-20 NOTE — PROGRESS NOTES
HPI:    Patient ID: Elizabeth Tsai is a 38 year old female.    Patient presents with bumps under both eyes since many years, but have not gone away. No signs or symptoms except that some of the bumps are getting larger. Patient also has small, clear blisters on fingers that upon healed leave patchy dry skin. Started showing 2 years ago. Blisters are itchy. Not using any meds. Denies hx of eczema or psoriasis in the past. No personal hx orf skin cancer. Father had melanoma on nose and cheek. No allergies to medications noted. Has warts on her elbows bilaterally.     Review of Systems   Constitutional:  Negative for chills and fever.   Musculoskeletal:  Negative for arthralgias and myalgias.   Skin:  Positive for rash. Negative for color change and wound.            Current Outpatient Medications   Medication Sig Dispense Refill   • tretinoin 0.025 % External Cream Apply 1 Application topically nightly. Use as tolerated 30 g 3   • Mometasone Furoate 0.1 % External Cream Apply 1 Application topically daily. Apply a thin film to the affected area(s). 45 g 0   • omeprazole 20 MG Oral Capsule Delayed Release TAKE ONE CAPSULE BY MOUTH IN THE MORNING BEFORE BREAKFAST 90 capsule 0   • amoxicillin clavulanate 875-125 MG Oral Tab Take 1 tablet by mouth 2 (two) times daily for 10 days. 20 tablet 0   • Cariprazine HCl (VRAYLAR) 1.5 MG Oral Cap Take 1.5 mg by mouth once daily.     • DULoxetine 30 MG Oral Cap DR Particles Take 2 capsules (60 mg total) by mouth daily.     • fenofibrate 145 MG Oral Tab Take 1 tablet (145 mg total) by mouth daily. 90 tablet 3   • lisinopril-hydroCHLOROthiazide 20-25 MG Oral Tab TAKE ONE TABLET BY MOUTH ONE TIME DAILY. 90 tablet 3   • Cholecalciferol (VITAMIN D) 50 MCG (2000 UT) Oral Tab Take by oral route 2000 units daily per 30 tablet 0   • FreeStyle Lancets Does not apply Misc Test blood sugar daily as directed 100 each 3   • Glucose Blood (FREESTYLE TEST) In Vitro Strip Test blood sugar  daily as directed 100 strip 3   • FreeStyle System Does not apply Kit Test blood sugar daily as directed 1 each 0   • semaglutide 2 MG/3ML Subcutaneous Solution Pen-injector Inject 0.5 mg into the skin once a week. (Patient not taking: Reported on 6/12/2024) 3 each 0     Allergies:No Known Allergies   There were no vitals taken for this visit.  There is no height or weight on file to calculate BMI.  PHYSICAL EXAM:   Physical Exam  Constitutional:       General: She is not in acute distress.     Appearance: Normal appearance.   Skin:     General: Skin is warm and dry.      Findings: Rash present.      Comments: Eczematous areas noted on the finger tips. No draining or tenderness noted. Slight scaling noted.     Sebaceous hyperplasia noted under the eyes. No draining or tenderness noted. No erythema noted. Waxy papules noted under the eyes.     Warts noted on the elbows bilaterally. No draining or tenderness noted. No sclaing noted. Small about 1-2 mm in size.    Neurological:      Mental Status: She is alert and oriented to person, place, and time.              ASSESSMENT/PLAN:   1. Sebaceous hyperplasia  -After discussion with patient, advised the following:  -Use retin A  -Educated to apply small amount at night   -Titrate up slowly  -To call or follow-up with worsening symptoms or concerns.   -Pt was agreeable to plan and will comply with discussion above.       2. Dyshidrotic eczema  -After discussion with patient, advised the following:  -Start mometasone  -Educated to apply 2 times per day for 2 weeks  -Return if using longer than this when flaring.   -To call or follow-up with worsening symptoms or concerns.   -Pt was agreeable to plan and will comply with discussion above.       3. Verruca(e)  -After discussion with patient, advised the following:  - Cryosurgery of non-malignant lesion(s)  - Risks, benefits, alternatives and personnel required for cryosurgery reviewed with patient. Discussed the expected  redness, as well as the risks of swelling, blistering, crusting, pigmentary changes, and permanent scarring. . Discussed that lesion may need additional treatments in future or may recur. Pt verbalizes understanding and wishes to proceed.   - Cryosurgery performed with Liquid Nitrogen via cryostat spray gun to warts. 7 lesion(s) treated.   - Patient tolerated well and wound care discussed.   -Pt was agreeable to plan and will comply with discussion above.           No orders of the defined types were placed in this encounter.      Meds This Visit:  Requested Prescriptions     Signed Prescriptions Disp Refills   • tretinoin 0.025 % External Cream 30 g 3     Sig: Apply 1 Application topically nightly. Use as tolerated   • Mometasone Furoate 0.1 % External Cream 45 g 0     Sig: Apply 1 Application topically daily. Apply a thin film to the affected area(s).       Imaging & Referrals:  None         ID#[id number]

## 2024-06-27 DIAGNOSIS — E11.9 TYPE 2 DIABETES MELLITUS WITHOUT COMPLICATION, WITHOUT LONG-TERM CURRENT USE OF INSULIN (HCC): ICD-10-CM

## 2024-07-01 NOTE — TELEPHONE ENCOUNTER
Please review. Rx failed/no protocol.    Requested Prescriptions   Pending Prescriptions Disp Refills    METFORMIN HCL 1000 MG Oral Tab [Pharmacy Med Name: METFORMIN HCL 1,000 MG TABLET] 180 tablet 3     Sig: TAKE 1 TABLET BY MOUTH TWICE A DAY WITH MEALS       Diabetes Medication Protocol Failed - 6/27/2024 12:48 AM        Failed - Last A1C < 7.5 and within past 6 months     Lab Results   Component Value Date    A1C 7.6 (H) 01/29/2024             Failed - Microalbumin procedure in past 12 months or taking ACE/ARB        Passed - In person appointment or virtual visit in the past 6 mos or appointment in next 3 mos     Recent Outpatient Visits              1 week ago Sebaceous hyperplasia    [facility], [facility] [place] Torsten Virgen PA-C    Office Visit    2 weeks ago Facial swelling    [facility], [facility] Shreya Molina APRN    Office Visit    4 weeks ago Major depressive disorder, recurrent, moderate (HCC)    [facility] [place] Jud Williamson APRN    Telemedicine    1 month ago Encounter for gynecological examination with abnormal finding    [facility] Lynn Olivarez CNM    Office Visit    1 month ago Sore throat    [facility] Natalie Mcgovern APRN    Office Visit          Future Appointments         Provider Department Appt Notes    In 4 days ADO DEXA RM1; ADO [facility] RM1 [facility]                     Passed - EGFRCR or GFRNAA > 50     GFR Evaluation  EGFRCR: 94 , resulted on 2/5/2024          Passed - GFR in the past 12 months             Future Appointments         Provider Department Appt Notes    In 4 days ADO DEXA RM1; ADO RUFUS RM1 [facility]           Recent Outpatient Visits              1 week ago Sebaceous hyperplasia    [facility],  [facility] Torsten Virgen PA-C    Office Visit    2 weeks ago Facial swelling    [facility], [facility] [place] Shreya Molina APRN    Office Visit    4 weeks ago Major depressive disorder, recurrent, moderate (HCC)    [facility] Jud Williamson, MINA    Telemedicine    1 month ago Encounter for gynecological examination with abnormal finding    [facility] Lynn Olivarez CNM    Office Visit    1 month ago Sore throat    [facility] [place] Natalie Mcgovern APRN    Office Visit

## 2024-07-04 ENCOUNTER — HOSPITAL ENCOUNTER (OUTPATIENT)
Age: 39
Discharge: HOME | End: 2024-07-04
Payer: COMMERCIAL

## 2024-07-04 VITALS
RESPIRATION RATE: 18 BRPM | HEART RATE: 94 BPM | SYSTOLIC BLOOD PRESSURE: 145 MMHG | TEMPERATURE: 97 F | DIASTOLIC BLOOD PRESSURE: 82 MMHG | OXYGEN SATURATION: 97 %

## 2024-07-04 DIAGNOSIS — J06.9 URI WITH COUGH AND CONGESTION: ICD-10-CM

## 2024-07-04 DIAGNOSIS — J02.9 ACUTE VIRAL PHARYNGITIS: Primary | ICD-10-CM

## 2024-07-04 DIAGNOSIS — Z20.818 STREP THROAT EXPOSURE: ICD-10-CM

## 2024-07-04 LAB — S PYO AG THROAT QL IA.RAPID: NEGATIVE

## 2024-07-04 PROCEDURE — 99212 OFFICE O/P EST SF 10 MIN: CPT

## 2024-07-04 PROCEDURE — 99213 OFFICE O/P EST LOW 20 MIN: CPT

## 2024-07-04 PROCEDURE — 87651 STREP A DNA AMP PROBE: CPT | Performed by: PHYSICIAN ASSISTANT

## 2024-07-04 NOTE — ED PROVIDER NOTES
Patient Seen in: [facility]      History     Chief Complaint   Patient presents with    Sore Throat     Stated Complaint: strep throat    Subjective:   HPI    Patient is a 38-year-old  female with hypertension, type 2 diabetes, anxiety, depression, presenting to immediate care for evaluation of sore throat.  Onset: Several days.  Concern for possible strep throat.   with current strep throat infection.  She has been experiencing some nasal congestion, nonproductive cough, and loose stools.  No fevers.  No chest pain or shortness of breath pain electric weakness, confusion.  Not immunocompromise.  No recent travel.  Here for strep testing.  Low COVID concerns.  Declining COVID testing.  No other concerns    Objective:   Past Medical History:    ADHD    Anxiety    Cancer (HCC)    endometrial, s/p robotic hyst/BSO    Depression    Diabetes ([facility])    Hemorrhoid    Morbid obesity with BMI of 40.0-44.9, adult (HCC)    Obesity, unspecified    Pre-diabetes    Unspecified essential hypertension              Past Surgical History:   Procedure Laterality Date    Hysterectomy  2013    Tonsillectomy                  No pertinent social history.            Review of Systems   Constitutional:  Negative for chills and fever.   HENT:  Positive for congestion and sore throat. Negative for ear pain, facial swelling, trouble swallowing and voice change.    Respiratory:  Positive for cough.    Cardiovascular:  Negative for chest pain.   Skin:  Negative for rash.   Neurological:  Negative for dizziness, weakness, light-headedness and headaches.   Psychiatric/Behavioral:  Negative for confusion.    All other systems reviewed and are negative.      Positive for stated Chief Complaint: Sore Throat    Other systems are as noted in HPI.  Constitutional and vital signs reviewed.      All other systems reviewed and negative except as noted above.    Physical Exam     ED Triage Vitals [07/04/24 1049]   /82    Pulse 94   Resp 18   Temp 97 °F (36.1 °C)   Temp src Temporal   SpO2 97 %   O2 Device None (Room air)       Current Vitals:   Vital Signs  BP: 145/82  Pulse: 94  Resp: 18  Temp: 97 °F (36.1 °C)  Temp src: Temporal    Oxygen Therapy  SpO2: 97 %  O2 Device: None (Room air)            Physical Exam  Vitals and nursing note reviewed.   Constitutional:       General: She is not in acute distress.     Appearance: Normal appearance. She is not ill-appearing, toxic-appearing or diaphoretic.      Comments: Alert, cooperative, pleasant, nontoxic-appearing   HENT:      Head: Normocephalic and atraumatic.      Right Ear: Tympanic membrane normal.      Left Ear: Tympanic membrane normal.      Ears:      Comments: Small bilateral ear effusion without erythema or bulging TM.  Cone of light present.  Ear canal without swelling or redness or drainage     Nose: Congestion present.      Comments: Nasal congestion     Mouth/Throat:      Mouth: Mucous membranes are moist.      Pharynx: No oropharyngeal exudate or posterior oropharyngeal erythema.      Comments: Postnasal drip  Eyes:      Conjunctiva/sclera: Conjunctivae normal.   Cardiovascular:      Rate and Rhythm: Normal rate and regular rhythm.      Pulses: Normal pulses.   Pulmonary:      Effort: Pulmonary effort is normal. No respiratory distress.      Breath sounds: Normal breath sounds. No stridor. No wheezing or rhonchi.   Abdominal:      Palpations: Abdomen is soft.      Tenderness: There is no abdominal tenderness.   Musculoskeletal:         General: Normal range of motion.      Cervical back: Normal range of motion and neck supple. No rigidity or tenderness.   Lymphadenopathy:      Cervical: No cervical adenopathy.   Skin:     Capillary Refill: Capillary refill takes less than 2 seconds.   Neurological:      General: No focal deficit present.      Mental Status: She is alert and oriented to person, place, and time.      Motor: No weakness.      Gait: Gait normal.    Psychiatric:         Mood and Affect: Mood normal.         Behavior: Behavior normal.         Thought Content: Thought content normal.         Judgment: Judgment normal.             ED Course     Labs Reviewed   RAPID STREP A - Normal     Results for orders placed or performed during the hospital encounter of 07/04/24   Rapid Strep A - ID NOW    Collection Time: 07/04/24 10:52 AM    Specimen: Throat; Other   Result Value Ref Range    Strep A by PCR Negative Negative              MDM     Differential diagnoses considered included, but are not exclusive of: URI, influenza, COVID, otitis, sinusitis, pharyngitis, strep throat, bronchitis, pneumonia, allergic rhinitis, etc.    Dx: Acute Viral Pharyngitis, Initial Encounter  Strep PCR negative  Consistent with viral pharyngitis  Overall well-appearing  Hemodynamically stable  Afebrile  Tolerating PO  Outpatient management  Supportive care  Rest  Oral hydration  Motrin/Tylenol as needed for pain/fever  Cepacol Lozenges for sore throat  Honey-Tea  PCP follow  Return precaution                                 Medical Decision Making      Disposition and Plan     Clinical Impression:  1. Acute viral pharyngitis    2. URI with cough and congestion    3. Strep throat exposure         Disposition:  Discharge  7/4/2024 11:12 am    Follow-up:  Consuelo Bangura MD  [address] [place] [id number]  [phone]                Medications Prescribed:  Discharge Medication List as of 7/4/2024 11:13 AM

## 2024-07-04 NOTE — ED INITIAL ASSESSMENT (HPI)
Patient reports loose stool x 1 week.  Denies emesis.  States + strep exposure through her .  She developed a sore throat last night around 8pm.

## 2024-08-12 ENCOUNTER — TELEPHONE (OUTPATIENT)
Dept: OBGYN CLINIC | Facility: CLINIC | Age: 39
End: 2024-08-12

## 2024-08-12 RX ORDER — FLUCONAZOLE 150 MG/1
150 TABLET ORAL
Qty: 2 TABLET | Refills: 0 | Status: SHIPPED | OUTPATIENT
Start: 2024-08-12

## 2024-08-12 NOTE — TELEPHONE ENCOUNTER
Pt Name and  verified.  Pt states that she is 99% sure she has a yeast infection. Tried otc treatment and it helped a little bit but now feels like it is full blown. Pt states that she has severe itching. Requesting treatment be sent to [facility] [place] on file. Routing to provider for consideration.

## 2024-08-12 NOTE — TELEPHONE ENCOUNTER
Left detailed message for pt regarding medication and need for scheduling. Pt to call back if she has any questions

## 2024-08-12 NOTE — TELEPHONE ENCOUNTER
I sent in for Diflucan if it doesn't clear up have her make an appointment with Lynn  I will be out of the office

## 2024-08-14 ENCOUNTER — HOSPITAL ENCOUNTER (OUTPATIENT)
Dept: MAMMOGRAPHY | Age: 39
Discharge: HOME | End: 2024-08-14
Attending: ADVANCED PRACTICE MIDWIFE
Payer: COMMERCIAL

## 2024-08-14 DIAGNOSIS — Z01.411 ENCOUNTER FOR GYNECOLOGICAL EXAMINATION WITH ABNORMAL FINDING: ICD-10-CM

## 2024-08-14 PROCEDURE — 77063 BREAST TOMOSYNTHESIS BI: CPT | Performed by: ADVANCED PRACTICE MIDWIFE

## 2024-08-14 PROCEDURE — 77067 SCR MAMMO BI INCL CAD: CPT | Performed by: ADVANCED PRACTICE MIDWIFE

## 2024-08-20 ENCOUNTER — TELEPHONE (OUTPATIENT)
Dept: OBGYN CLINIC | Facility: CLINIC | Age: 39
End: 2024-08-20

## 2024-08-20 NOTE — TELEPHONE ENCOUNTER
Patient called. Is requesting a note for place of employment. Patient missed work 8/18/24-8/20/24 due to yeast infection and could not even sit down. Patient was not able to work due to symptoms and started taking medication 8/19/24. Please send note to mychart.

## 2024-09-16 DIAGNOSIS — K21.9 GASTROESOPHAGEAL REFLUX DISEASE, UNSPECIFIED WHETHER ESOPHAGITIS PRESENT: ICD-10-CM

## 2024-09-19 RX ORDER — FENOFIBRATE 145 MG/1
145 TABLET, COATED ORAL DAILY
Qty: 90 TABLET | Refills: 3 | Status: SHIPPED | OUTPATIENT
Start: 2024-09-19

## 2024-09-19 RX ORDER — LISINOPRIL AND HYDROCHLOROTHIAZIDE 20; 25 MG/1; MG/1
TABLET ORAL
Qty: 90 TABLET | Refills: 3 | Status: SHIPPED | OUTPATIENT
Start: 2024-09-19

## 2024-09-19 NOTE — TELEPHONE ENCOUNTER
Please review. Protocol Failed; No Protocol    Requested Prescriptions   Pending Prescriptions Disp Refills    FENOFIBRATE 145 MG Oral Tab [Pharmacy Med Name: FENOFIBRATE 145 MG TABLET] 90 tablet 3     Sig: TAKE 1 TABLET BY MOUTH EVERY DAY       Cholesterol Medication Protocol Failed - 9/15/2024  7:42 AM        Failed - ALT < 80     Lab Results   Component Value Date    ALT 51 08/18/2023             Failed - ALT resulted within past year        Failed - Lipid panel within past 12 months     Lab Results   Component Value Date    CHOLEST 133 05/06/2022    TRIG 559 (H) 05/06/2022    HDL 27 (L) 05/06/2022    LDL 29 05/06/2022    VLDL 74 (H) 05/06/2022    NONHDLC 106 05/06/2022             Passed - In person appointment or virtual visit in the past 12 mos or appointment in next 3 mos     Recent Outpatient Visits              3 months ago Sebaceous hyperplasia    [facility] [place] Torsten Virgen PA-C    Office Visit    3 months ago Facial swelling    [facility] Shreya Molina APRN    Office Visit    3 months ago Major depressive disorder, recurrent, moderate (HCC)    [facility] Jud Williamson APRN    Telemedicine    4 months ago Encounter for gynecological examination with abnormal finding    [facility] Lynn Olivarez CNM    Office Visit    4 months ago Sore throat    [facility] [place] Natalie Mcgovern APRN    Office Visit                        LISINOPRIL-HYDROCHLOROTHIAZIDE 20-25 MG Oral Tab [Pharmacy Med Name: LISINOPRIL-HCTZ 20-25 MG TAB] 90 tablet 3     Sig: TAKE 1 TABLET BY MOUTH EVERY DAY       Hypertension Medications Protocol Failed - 9/15/2024  7:42 AM        Failed - Last BP reading less than 140/90     BP Readings from Last 1 Encounters:   07/04/24 145/82               Passed - CMP or BMP in  past 12 months        Passed - In person appointment or virtual visit in the past 12 mos or appointment in next 3 mos     Recent Outpatient Visits              3 months ago Sebaceous hyperplasia    [facility]    Office Visit    3 months ago Facial swelling    [facility], [facility], Shreya Cardona, APRARTURO    Office Visit    3 months ago Major depressive disorder, recurrent, moderate (HCC)    [facility], [facility], Jud Gillespie, APRARTURO    Telemedicine    4 months ago Encounter for gynecological examination with abnormal finding    [facility] Lynn Olivarez, CNESSENCE    Office Visit    4 months ago Sore throat    [facility], [facility], [place]ie Robles APRN    Office Visit                      Passed - EGFRCR or GFRNAA > 50     GFR Evaluation  EGFRCR: 94 , resulted on 2/5/2024                   Recent Outpatient Visits              3 months ago Sebaceous hyperplasia    [facility], [facility], Torsten Figueroa PA-C    Office Visit    3 months ago Facial swelling    [facility], [facility], Shreya Cardona, [place]    Office Visit    3 months ago Major depressive disorder, recurrent, moderate (HCC)    [facility], [facility], Jud Gillespie, APRN    Telemedicine    4 months ago Encounter for gynecological examination with abnormal finding    [facility] Lynn Olivarez CNM    Office Visit    4 months ago Sore throat    [facility], [facility] [place]    Office Visit

## 2024-09-19 NOTE — TELEPHONE ENCOUNTER
Refill passed per [facility] protocol.  Requested Prescriptions   Pending Prescriptions Disp Refills    OMEPRAZOLE 20 MG Oral Capsule Delayed Release [Pharmacy Med Name: OMEPRAZOLE DR 20 MG CAPSULE] 90 capsule 0     Sig: TAKE ONE CAPSULE BY MOUTH IN THE MORNING BEFORE BREAKFAST       Gastrointestional Medication Protocol Passed - 9/16/2024 12:26 AM        Passed - In person appointment or virtual visit in the past 12 mos or appointment in next 3 mos     Recent Outpatient Visits              3 months [facility]    Office Visit    3 months ago [facility]ial [facility]    Office Visit    3 months ago Major depressive disorder, recurrent, moderate (HCC)    [facility] Jud Williamson, [place]    Telemedicine    4 months ago Encounter for gynecological examination with abnormal finding    [facility] Lynn Olivarez CNM    Office Visit    4 months ago Sore throat    [facility] Natalie Mcgovern, MIN[place]    Office Visit                         Recent Outpatient Visits              3 months ago Sebaceous hyperplasia    [facility] Torsten Virgen PA-C    Office Visit    3 months ago Facial swelling    [facility]    Office Visit    3 months ago Major depressive disorder, recurrent, moderate (HCC)    [facility] Jud Williamson, APRARTURO    Telemedicine    4 months ago Encounter for gynecological examination with abnormal finding    [facility] [place] Olivareza, CNM    Office Visit    4 months ago Sore throat    [facility], Natalie Robles APRN    Office Visit

## 2024-09-22 ENCOUNTER — HOSPITAL ENCOUNTER (OUTPATIENT)
Age: 39
Discharge: HOME | End: 2024-09-22
Payer: COMMERCIAL

## 2024-09-22 ENCOUNTER — APPOINTMENT (OUTPATIENT)
Dept: GENERAL RADIOLOGY | Age: 39
End: 2024-09-22
Attending: NURSE PRACTITIONER
Payer: COMMERCIAL

## 2024-09-22 VITALS
BODY MASS INDEX: 35.07 KG/M2 | RESPIRATION RATE: 18 BRPM | TEMPERATURE: 97 F | WEIGHT: 245 LBS | OXYGEN SATURATION: 97 % | HEIGHT: 70 IN | HEART RATE: 106 BPM

## 2024-09-22 DIAGNOSIS — M25.569 KNEE PAIN: Primary | ICD-10-CM

## 2024-09-22 PROCEDURE — 73560 X-RAY EXAM OF KNEE 1 OR 2: CPT | Performed by: NURSE PRACTITIONER

## 2024-09-22 PROCEDURE — 99213 OFFICE O/P EST LOW 20 MIN: CPT | Performed by: PHYSICIAN ASSISTANT

## 2024-09-22 NOTE — ED INITIAL ASSESSMENT (HPI)
Pt presents to the IC with c/o atraumatic left knee pain for the last several weeks. Pain occurs when flexing and resolves when ambulating. Pt notes if she walks too much, the joint feels like pressure needs to be applied to relieve it.

## 2024-09-22 NOTE — DISCHARGE INSTRUCTIONS
You may purchase over-the-counter diclofenac (Voltaren) and apply this 3 times each day for knee pain and swelling

## 2024-09-22 NOTE — ED PROVIDER NOTES
Patient Seen in: [facility]      History     Chief Complaint   Patient presents with    Knee Pain     Stated Complaint: Knee Pain    Subjective:   HPI    39-year-old female presenting for evaluation of left knee pain, need for a work note.  Patient states she has been working overtime recently and notes at the end of her day she is having medial knee pain.  She denies any injury or trauma.  She does not appreciate swelling or bruising.  She has no pain in the calf, ankle or foot.  She has taken Tylenol, ibuprofen at times.  She missed a few days of work last week and is requesting a note.    Objective:   Past Medical History:    ADHD    Anxiety    Cancer (HCC)    endometrial, s/p robotic hyst/BSO    Depression    Diabetes (HCC)    Hemorrhoid    Morbid obesity with BMI of 40.0-44.9, adult (HCC)    Obesity, unspecified    Pre-diabetes    Unspecified essential hypertension              Past Surgical History:   Procedure Laterality Date    Hysterectomy  2013    Tonsillectomy                  Social History     Socioeconomic History    Marital status:    Tobacco Use    Smoking status: Never    Smokeless tobacco: Never   Vaping Use    Vaping status: Never Used   Substance and Sexual Activity    Alcohol use: Not Currently     Comment: rarely    Drug use: No   Other Topics Concern    Caffeine Concern Yes     Comment: Chocolate daily;     Breast feeding No    Reaction to local anesthetic No    Pt has a pacemaker No    Pt has a defibrillator No              Review of Systems    Positive for stated Chief Complaint: Knee Pain    Other systems are as noted in HPI.  Constitutional and vital signs reviewed.      All other systems reviewed and negative except as noted above.    Physical Exam     ED Triage Vitals [09/22/24 1330]   BP    Pulse 106   Resp 18   Temp 96.8 °F (36 °C)   Temp src Temporal   SpO2 97 %   O2 Device None (Room air)       Current Vitals:   Vital Signs  Pulse: 106  Resp: 18  Temp: 96.8 °F  (36 °C)  Temp src: Temporal    Oxygen Therapy  SpO2: 97 %  O2 Device: None (Room air)            Physical Exam  Vitals and nursing note reviewed.   Constitutional:       General: She is not in acute distress.  HENT:      Head: Normocephalic and atraumatic.      Right Ear: External ear normal.      Left Ear: External ear normal.      Nose: Nose normal.      Mouth/Throat:      Mouth: Mucous membranes are moist.   Eyes:      Extraocular Movements: Extraocular movements intact.      Pupils: Pupils are equal, round, and reactive to light.   Cardiovascular:      Rate and Rhythm: Normal rate.   Pulmonary:      Effort: Pulmonary effort is normal.   Abdominal:      General: Abdomen is flat.   Musculoskeletal:         General: Normal range of motion.      Cervical back: Normal range of motion.      Left knee: Normal. No bony tenderness. Normal range of motion.   Skin:     General: Skin is warm.   Neurological:      General: No focal deficit present.      Mental Status: She is alert and oriented to person, place, and time.   Psychiatric:         Mood and Affect: Mood normal.         Behavior: Behavior normal.               ED Course   Labs Reviewed - No data to display      40 yo female here with c/o L knee pain. States she has had knee pain for several weeks but worse this week causing her to miss work.    Ddx- knee sprain, osteoarthritis, ligamentous injury    Xray negative for fracture. Discussed supportive measures and pcp follow up if the symptoms persist              MDM                                         Medical Decision Making      Disposition and Plan     Clinical Impression:  1. Knee pain         Disposition:  Discharge  9/22/2024  2:46 pm    Follow-up:  Consuelo Bangura MD  [address] [place] [id number]  [phone]                Medications Prescribed:  Discharge Medication List as of 9/22/2024  3:24 PM

## 2025-03-25 NOTE — PROGRESS NOTES
Patient ID: Elizabeth Tsai is a 39 year old female.  No chief complaint on file.         HISTORY OF PRESENT ILLNESS:   Patient presents for above.  This visit is conducted using Telemedicine with live, interactive video and audio.  C/c elevted bl sugars   C/o off ozempic bc she was feeling sick when on it -- had started 4 weeks , tried that twice and completely stopped one yr ago   Seeing psychiatrist and a therapist - meds working well ,although expensive   She moved in oct to a larger house - one half hours away-- and stressed so diet was poor and now trying to get back into following a good diet   Had some dizziness and went to ER a few days ago and found to have kidney stones         Review of Systems   Ten point review of systems otherwise negative with the exception of HPI and assessment and plan.    MEDICAL HISTORY:     Past Medical History:    ADHD    Anxiety    Cancer (HCC)    endometrial, s/p robotic hyst/BSO    Depression    Diabetes (HCC)    Hemorrhoid    Morbid obesity with BMI of 40.0-44.9, adult (HCC)    Obesity, unspecified    Pre-diabetes    Unspecified essential hypertension       Past Surgical History:   Procedure Laterality Date    Hysterectomy  2013    Tonsillectomy           Current Outpatient Medications:     DULoxetine 60 MG Oral Cap DR Particles, Take 1 capsule (60 mg total) by mouth daily., Disp: , Rfl:     glipiZIDE 2.5 MG Oral Tab, Take 2.5 mg by mouth 2 (two) times daily before meals., Disp: 180 tablet, Rfl: 2    fenofibrate 145 MG Oral Tab, Take 1 tablet (145 mg total) by mouth daily., Disp: 90 tablet, Rfl: 3    lisinopril-hydroCHLOROthiazide 20-25 MG Oral Tab, TAKE 1 TABLET BY MOUTH EVERY DAY, Disp: 90 tablet, Rfl: 3    omeprazole 20 MG Oral Capsule Delayed Release, TAKE ONE CAPSULE BY MOUTH IN THE MORNING BEFORE BREAKFAST, Disp: 90 capsule, Rfl: 3    metFORMIN HCl 1000 MG Oral Tab, Take 1 tablet (1,000 mg total) by mouth 2 (two) times daily with meals., Disp: 180 tablet, Rfl:  3    tretinoin 0.025 % External Cream, Apply 1 Application topically nightly. Use as tolerated, Disp: 30 g, Rfl: 3    Mometasone Furoate 0.1 % External Cream, Apply 1 Application topically daily. Apply a thin film to the affected area(s)., Disp: 45 g, Rfl: 0    Cariprazine HCl (VRAYLAR) 1.5 MG Oral Cap, Take 1.5 mg by mouth once daily., Disp: , Rfl:     Cholecalciferol (VITAMIN D) 50 MCG (2000 UT) Oral Tab, Take by oral route 2000 units daily per, Disp: 30 tablet, Rfl: 0    FreeStyle Lancets Does not apply Misc, Test blood sugar daily as directed, Disp: 100 each, Rfl: 3    Glucose Blood (FREESTYLE TEST) In Vitro Strip, Test blood sugar daily as directed, Disp: 100 strip, Rfl: 3    FreeStyle System Does not apply Kit, Test blood sugar daily as directed, Disp: 1 each, Rfl: 0    Allergies:Allergies[1]    Social History     Socioeconomic History    Marital status:      Spouse name: Not on file    Number of children: Not on file    Years of education: Not on file    Highest education level: Not on file   Occupational History    Not on file   Tobacco Use    Smoking status: Never    Smokeless tobacco: Never   Vaping Use    Vaping status: Never Used   Substance and Sexual Activity    Alcohol use: Not Currently     Comment: rarely    Drug use: No    Sexual activity: Not on file   Other Topics Concern     Service Not Asked    Blood Transfusions Not Asked    Caffeine Concern Yes     Comment: Chocolate daily;     Occupational Exposure Not Asked    Hobby Hazards Not Asked    Sleep Concern Not Asked    Stress Concern Not Asked    Weight Concern Not Asked    Special Diet Not Asked    Back Care Not Asked    Exercise Not Asked    Bike Helmet Not Asked    Seat Belt Not Asked    Self-Exams Not Asked    Grew up on a farm Not Asked    History of tanning Not Asked    Outdoor occupation Not Asked    Breast feeding No    Reaction to local anesthetic No    Pt has a pacemaker No    Pt has a defibrillator No   Social History  Narrative    Not on file     Social Drivers of Health     Food Insecurity: Not on file   Transportation Needs: Not on file   Stress: Not on file   Housing Stability: Not on file       PHYSICAL EXAM:   Unable to perform vitals or do physical exam as this is a virtual video visit.  Patient appears alert and oriented x 3 , NAD   Patient speaking complete sentences on any conversational dyspnea or respiratory distress  No coughing heard           ASSESSMENT/PLAN:   1. Type 2 diabetes mellitus without complication, without long-term current use of insulin (HCC)  - glipiZIDE 2.5 MG Oral Tab; Take 2.5 mg by mouth 2 (two) times daily before meals.  Dispense: 180 tablet; Refill: 2  - Endocrine Referral - In Network  - Microalb/Creat Ratio, Random Urine; Future  - Comp Metabolic Panel (14); Future  - TSH W Reflex To Free T4; Future  - Lipid Panel; Future  - Hemoglobin A1C; Future  Reviewed Labs and new ones ordered , continue medications, added glipizide, placed referral for endocrinology close to where she lives    2. Essential hypertension with goal blood pressure less than 140/90  - Microalb/Creat Ratio, Random Urine; Future  - Comp Metabolic Panel (14); Future  - TSH W Reflex To Free T4; Future  - Lipid Panel; Future  - Hemoglobin A1C; Future  Advised pt to follow a low salt , low sodium (including fast foods and processed foods), can look up DASH diet, exercise 30 min a day , monitor bp     3. Major depressive disorder, recurrent, moderate (HCC)  - DULoxetine 60 MG Oral Cap DR Particles; Take 1 capsule (60 mg total) by mouth daily.  Cont meds and f/u therapist and psychiatrist     No follow-ups on file.    Time spent on encounter  18 minutes   Video time 18 minutes   Documentation time 18 minutes     Elizabeth Tsai understands video evaluation is not a substitute for face-to-face examination or emergency care. Patient advised to go to ER or call 911 for worsening symptoms or acute distress.     Telehealth outside  of [facility]  Telehealth Verbal Consent   I conducted a telehealth visit with Elizabeth Tsai today, 03/25/25, which was completed using two-way, real-time interactive audio and video communication. This has been done in good kathi to provide continuity of care in the best interest of the provider-patient relationship, due to the COVID - public health crisis/national emergency where restrictions of face-to-face office visits are ongoing. Every conscious effort was taken to allow for sufficient and adequate time to complete the visit.  The patient was made aware of the limitations of the telehealth visit, including treatment limitations as no physical exam could be performed.  The patient was advised to call 911 or to go to the ER in case there was an emergency.  The patient was also advised of the potential privacy & security concerns related to the telehealth platform.   The patient was made aware of where to find [facility]'s notice of privacy practices, telehealth consent form and other related consent forms and documents.  which are located on the [facility] website. The patient verbally agreed to telehealth consent form, related consents and the risks discussed.    Lastly, the patient confirmed that they were in [place].   Included in this visit, time may have been spent reviewing labs, medications, radiology tests and decision making. Appropriate medical decision-making and tests are ordered as detailed in the plan of care above.  Coding/billing information is submitted for this visit based on complexity of care and/or time spent for the visit.    This note was prepared using Dragon Medical voice recognition dictation software. As a result errors may occur. When identified these errors have been corrected. While every attempt is made to correct errors during dictation discrepancies may still exist.    Consuelo Bangura MD  3/25/2025         [1] No Known Allergies

## 2025-03-27 NOTE — TELEPHONE ENCOUNTER
[Last virtual visit was on 3/25/25]    Patient called asking if she is to take the glipizide in addition to her metformin of in lieu of it. The last virtual visit notes indicate the following:    \"Reviewed Labs and new ones ordered , continue medications, added glipizide, placed referral for endocrinology close to where she lives\"    Made patient aware of above. Patient verbalized understanding. No further questions or concerns at this time.

## 2025-05-02 NOTE — TELEPHONE ENCOUNTER
Please review. Refill failed protocol.     Sent [employer] message to patient the refill request was forwarded to provider.

## (undated) DIAGNOSIS — E11.9 TYPE 2 DIABETES MELLITUS WITHOUT COMPLICATION, WITHOUT LONG-TERM CURRENT USE OF INSULIN (HCC): Primary | ICD-10-CM

## (undated) NOTE — LETTER
Date & Time: 9/24/2021, 3:12 PM  Patient: Max Jaramillo  Encounter Provider(s):    Elsy Hamilton PA-C       To Whom It May Concern:    Abelardo Rosenberg was seen and treated in our department on 9/24/2021.  She can return to work with these li

## (undated) NOTE — LETTER
12/27/2018          To Whom It May Concern:    Ajith Cárdenas is currently under my medical care and may not return to work at this time. Please excuse [place] Fatmata for 2 Days    She may return to work on Saturday December 29, 2018.     Activity is restri

## (undated) NOTE — MR AVS SNAPSHOT
[facility] SPECIALTY [facility] - [address]  [id number] [phone]               Thank you for choosing us for your health care visit with Jinny Fried MD.  We are glad to serve you and happy to provide you with this summary o Central Scheduling at [phone]. Select option 1 for an [facility] location, option 2 for an [facility] location    Assoc Dx:   Morbid obesity due to excess calories ([facility] [E66.01]          Reason for [address] Make half your plate fruits and vegetables Highly refined, white starches including white bread, rice and pasta   Eat plenty of protein, keep the fat content low Sugars:  sodas and sports drinks, candies and desserts   Eat plenty of low-fat dairy products

## (undated) NOTE — ED AVS SNAPSHOT
Claudean Smoker   MRN: [id number]    Department:  [facility] Emergency Department   Date of Visit:  12/12/2018           Disclosure     Insurance plans vary and the physician(s) referred by the ER may not be covered by your plan.  Please cont CARE PHYSICIAN AT ONCE OR RETURN IMMEDIATELY TO THE EMERGENCY DEPARTMENT. If you have been prescribed any medication(s), please fill your prescription right away and begin taking the medication(s) as directed.   If you believe that any of the medications

## (undated) NOTE — LETTER
Date & Time: 11/14/2022, 12:11 PM  Patient: Lluvia Norton  Encounter Provider(s):    Jorje Mcghee MD       To Whom It May Concern:    Gordo Johnson was seen and treated in our department on 11/14/2022. She should not return to work until 11/16/2022.     If you have any questions or concerns, please do not hesitate to call.        _____________________________  Physician/APC Signature

## (undated) NOTE — MR AVS SNAPSHOT
[address]  [phone]               Thank you for choosing us for your health care visit with Mary Blair MD.  We are glad to serve you and happy to provide you with this summa contact the physician, as this may affect the exam/procedure. All herbal medication should be held 14 days prior to the procedure or at the time of scheduling/screening. Inform the department if you have any allergies to latex.   If Valium is prescribed Take 1 capsule (20 mg total) by mouth every morning before breakfast.   Commonly known as:  PRILOSEC                   [employer]     Call the [employer] for assistance with your inactive [employer] account    If you have questions, you can call [phone] to

## (undated) NOTE — LETTER
5/6/2024              Eliazbeth Tsai        [address]        [facility] [address]         Dear ElizabethElizabeth is currently under my medical care and may not return to work at this time.    Please excuse Elizabeth for 2 days.  She may return to work on 5/8/2024. Her current condition began on Saturday, May 4th. She is not able to work due to her current symptoms. She should remain off from work until 5/8. She should not be around others due to potentially being contagious.   Activity is restricted as follows: none.     If you require additional information please contact our office.    Sincerely,         MINA Cortés          Document electronically generated by:  MINA Cortés

## (undated) NOTE — LETTER
8/18/2023          To Whom It May Concern:    Please excuse Yasemin Dacosta on 8/18/2023 from 2:30 to 5pm as she completes an office visit and laboratory work. If you require additional information please contact our office.         Sincerely,    MINA Jarvis          Document generated by:  MINA Jarvis

## (undated) NOTE — LETTER
[facility] [place] [address]  [address] [facility] [address]  Dept: [phone]  Dept Fax: [phone]  Loc: [phone]      March 4, 2017    Patient: Jaclyn Joseph   Date of Visit: 3/4/2017       To Whom It May Concern:    Nicky

## (undated) NOTE — LETTER
02/03/20        Elizabeth Tsai  [address] [place] [id number]      Dear Dayday Seth,    Our records indicate that you have outstanding lab work and or testing that was ordered for you and has not yet been completed:  Orders Placed This Encounter      Comp Metabolic Panel (14)      Hemoglobin A1C [E]      Lipid Panel      Microalb/Creat Ratio, Random Urine      TSH W Reflex To Free T4 [E]    To provide you with the best possible care, please complete these orders at your earliest convenience. If you have recently completed these orders please disregard this letter. If you have any questions please call the office at Dept: [phone].      Thank you,       Nishant Crawford MD

## (undated) NOTE — LETTER
8/22/2023          To Whom It May Concern:    Tomás Jorge is currently under my medical care and may not return to work at this time. Please excuse Radha Margarita for 4 days. She is not capable of performing her job duties due to having respiratory symptoms. She may return to work on 8/27/2023. Activity is restricted as follows: none. If you require additional information please contact our office.         Sincerely,         MINA Bethea          Document generated by:  MINA Bethea

## (undated) NOTE — LETTER
3/11/2024          To Whom It May Concern:    Elizabeth Tsai is currently under my medical care and may not return to work at this time.    Please excuse Elizabeth for 2 days.  She may return to work on 3/15/2024. Her current condition began on Sunday, March 10th. She is not able to work due to her current symptoms. She should remain off from work until 3/15. She is should not be around others due to potentially being contagious.   Activity is restricted as follows: none.    If you require additional information please contact our office.        Sincerely,         MINA Cortés          Document generated by:  MINA Cortés

## (undated) NOTE — LETTER
11/8/2023          To Whom It May Concern:    Please excuse Page Angel from 10/28/2023 to 11/8/2023 as she was unable to perform work duties due to being unable to focus due to pain. She may return to work on 11/9/2023 on light duty. Please allow her 2 weeks on light duty/sedentary work. Activity is restricted as follows: light duty and no bending or lifting. If you require additional information please contact our office.         Sincerely,    MINA Ricci          Document generated by:  MINA Ricci

## (undated) NOTE — LETTER
02/19/19        Elizabeth Tsai  [address] [place] [address]      Dear Mike Blanco,    Our records indicate that you have outstanding lab work and or testing that was ordered for you and has not yet been completed:  Orders Placed This Encounter      Comp Metabolic Panel (14) [E]      Lipid Panel [E]      TSH W Reflex To Free T4 [E]    To provide you with the best possible care, please complete these orders at your earliest convenience. If you have recently completed these orders please disregard this letter. If you have any questions please call the office at Dept: [phone].      Thank you,       Jose G Hernandez MD

## (undated) NOTE — LETTER
6/18/2020          To Whom It May Concern:    Radhames Gibbs is currently under my medical care and may not return to work at this time. Please excuse Marina Mendes for 2 days.   She may return to work on 6/22/2020 unless she calls me and tells me that she

## (undated) NOTE — LETTER
10/18/2023          To Whom It May Concern:    Please excuse Wang Reid from 10/9/2023 to 10/19/2023 due to being incapacitated. She was taking an opioid for pain management. May return to work on light duty on 10/20/2023. Will be unable to perform bending, running, lifting anything over 15lbs. Light duty is expected to last until 10/25/2023. If you require additional information please contact our office.         Sincerely,    MINA Phillips          Document generated by:  MINA Phillips

## (undated) NOTE — LETTER
12/21/2018          To Whom It May Concern:    Kervin Loo is currently under my medical care this is to inform you  pt is under my care seen and evaluated today.   Patient states although she was seen for 9 positional vertigo on 12/12/2018, she has

## (undated) NOTE — LETTER
Date & Time: 1/15/2024, 5:34 PM  Patient: Elizabeth Tsai  Encounter Provider(s):    Vita Costa APRN       To Whom It May Concern:    Elizabeth Tsai was seen and treated in our department on 1/15/2024. She can return to work 1/16/24.    If you have any questions or concerns, please do not hesitate to call.        _____________________________  Physician/APC Signature

## (undated) NOTE — LETTER
Date & Time: 7/4/2024, 11:11 AM  Patient: Elizabeth Tsai  Encounter Provider(s):    Sadiq Elias PA       To Whom It May Concern:    Elizabeth Tsai was seen and treated in our department on 07/4/2024. Please excuse from work until Monday, July 8, 2024 as incapable of performing work-duties. May return on 07/08/2024. No restrictions.     If you have any questions or concerns, please do not hesitate to call.        _____________________________  Physician/APC Signature

## (undated) NOTE — LETTER
Date & Time: 12/31/2018, 2:28 PM  Patient: Jaclyn Joseph  Encounter Provider(s):    MINA Bishop       To Whom It May Concern:    Dong Boyd was seen and treated in our department on 12/31/2018.  She should not return to work until [facility]

## (undated) NOTE — LETTER
01/03/18        [address]  [address] [facility]      Dear Leigh Renteria,    [facility] records indicate that you have outstanding lab work and or testing that was ordered for you and has not yet been completed:          CBC W Dif

## (undated) NOTE — LETTER
2/5/2024          To Whom It May Concern:    Elizabeth Tsai is currently under my medical care and may not return to work at this time.  She was seen in office today, February 5, 2024.   Please excuse Elizabeth for 3 days, (2/4, 2/5 and 2/6). She was not able to go to work due to having abdominal pain and urinary symptoms.   She may return to work on 2/7/2024.  Activity is restricted as follows: none.    If you require additional information please contact our office.        Sincerely,         MINA Cortés          Document generated by:  MINA Cortés

## (undated) NOTE — LETTER
1/8/2020          To Whom It May Concern:    Priti Pariseilles is currently under my medical care and was seen and evaluated today. Please excuse Ac Santiago for 3 days. She may return. Activity is restricted as follows: none.     If you require additional

## (undated) NOTE — LETTER
Date & Time: 3/16/2022, 7:01 PM  Patient: Ollen Peabody  Encounter Provider(s):    MINA Allen       To Whom It May Concern:    Francisco Javier Lazar was seen and treated in our department on 3/16/2022. She should not return to work until 3/21/2022.     If you have any questions or concerns, please do not hesitate to call.        _________[place] APN____________________  Physician/APC Signature

## (undated) NOTE — ED AVS SNAPSHOT
[facility] AND [facility] in [address] [address] Tony Meeks    Phone:  [phone]    Fax:  [address] [facility]   MRN: [id number]    Department:  [facility] AND [facility] in [address] [place] [address]   Date of Visit: physician may seek payment directly from you for amounts other than your deductible, co-payment, or co-insurance and for other services not covered under your health insurance plan.   Please contact your insurance company and physician's office to determine different from what your Primary Care doctor has instructed you - please continue to take your medications as instructed by your Primary Care doctor until you can check with your doctor. Please bring the medication list to your next doctor's appointment. coverage. Patient [address] is a Federal [employer] program that can help with your Affordable Care Act coverage, as well as all types of Medicaid plans.   To get signed up and covered, please call [phone] and ask to get set up for an insuran

## (undated) NOTE — LETTER
4/26/2017              Elizabeth RITTER [address],  [facility]         Dear Nadyne Closs,    It was a pleasure to see you at our [address] OB/GYNE office.   Your Mammogram was Normal.  There

## (undated) NOTE — LETTER
Date & Time: 7/4/2024, 11:14 AM  Patient: Elizabeth Tsai  Encounter Provider(s):    Sadiq Elias PA       To Whom It May Concern:    Elizabeth Tsai was seen and treated in our department on 07/4/2024. Onset of illness: 07/03/2024. Please excuse from work until Monday, July 8, 2024 as she is incapable of performing current work-duties. May return on 07/08/2024. No restrictions.     If you have any questions or concerns, please do not hesitate to call.        _____________________________  Physician/APC Signature

## (undated) NOTE — LETTER
6/12/2023          To Whom It May Concern:    Marlon Stapleton is currently under my medical care and may not return to work at this time. Please excuse Abby Worrell for 2 days. She may return to work on 6/13/2023. Activity is restricted as follows: none. If you require additional information please contact our office.         Sincerely,    Courtney Gurrola MD          Document generated by:  Courtney Gurrola MD

## (undated) NOTE — LETTER
10/27/2023          To Whom It May Concern:     Please excuse Francisca Lay for 5 days. She may return to work on 10/28/2023. Activity is restricted as follows: light duty for one week. Then may return to previous work duties. If you require additional information please contact our office.         Sincerely,    MINA Womack          Document generated by:  MINA Womack

## (undated) NOTE — LETTER
7/2/2018          To Whom It May Concern:    Ron Villalba is currently under my medical care and was seen in the office 7/2/2018. If you require additional information please contact our office.         Sincerely,    Onnie Eisenmenger, MD

## (undated) NOTE — LETTER
Date & Time: 2/10/2019, 1:15 PM  Patient: Darshana Larkin  Encounter Provider(s):    Ese Stapleton MD       To Whom It May Concern:    Maira Ho was seen and treated in our department on 2/10/2019.  She should not return to work until 2/12/1

## (undated) NOTE — LETTER
April 17, 2020    Patient: Fidelina Dial   YOB: 1985     Dear Employer,  The above patient is under my care and telephone visit was done and she stated that she is completely without any symptoms and she may return 4/23/2020.       A it is at all feasible for them to do so. COVID-19 is especially risky for high-risk patients (including, but not limited to, age over 61, immunosuppressed status due to disease or medication, chronic respiratory or heart conditions and diabetes). ?  During

## (undated) NOTE — LETTER
Date & Time: 12/12/2018, 2:23 PM  Patient: Radhames Gibbs  Encounter Provider(s):    Mike Williamson MD       To Whom It May Concern:    Amanda Arevalo was seen and treated in our department on 12/12/2018.  She should not return to work until 12/

## (undated) NOTE — LETTER
Date & Time: 9/22/2024, 2:09 PM  Patient: Elizabeth Tsai  Encounter Provider(s):    Jessica Tim PA-C       To Whom It May Concern:    Elizabeth Tsai was seen and treated in our department on 9/22/2024. She can return to work 9/23/2024.    If you have any questions or concerns, please do not hesitate to call.        _____________________________  Physician/APC Signature

## (undated) NOTE — LETTER
4/12/2023          To Whom It May Concern:    Luis Gallardo is currently under my medical care and was seen today on 4/12/23. Please excuse her for 4/12/23. If you require additional information please contact our office.         Sincerely,    Brodie Klinefelter, PA-C            Document generated by:  Brodie Klinefelter, PA-C

## (undated) NOTE — LETTER
[address]     To whom it may concern,   This is to inform you that the above patient is under my care and was seen and evaluated today.   Patient states although she was seen for lyubov

## (undated) NOTE — LETTER
1/28/2021          To Whom It May Concern:    Yasmin Parrish is currently under my medical care and may not return to work at this time. Please excuse [facility] for 4 days. She may return to work on 1/29/2021.   Activity is restricted as follows: none

## (undated) NOTE — LETTER
5/17/2024              Elizabeth Tsai        [address] [facility] [address]         To Whom it May Concern,    Elizabeth has a medical condition that started 5/12/24. She was seen in the office for this condition today and was incapacitated and could not perform her work duties.    Sincerely,    Lynn Olivarez CNM          Document electronically generated by:  Lynn Olivarez CNM

## (undated) NOTE — MR AVS SNAPSHOT
[facility] [place] [address] [place]  [id number] [phone]               Thank you for choosing us for your health care visit with Luis Alberto Lombardi MD.  We are glad to serve you and happy to provide you with this summary o medication should be held 14 days prior to the procedure or at the time of scheduling/screening. Inform the department if you have any allergies to latex.   If Valium is prescribed by your surgeon, make sure there is someone to drive you home the day of t Call the [employer] for assistance with your inactive [employer] account    If you have questions, you can call [phone] to talk to our [facility] Staff. Remember, [employer] is NOT to be used for urgent needs. For medical emergencies, dial 911.     Vi

## (undated) NOTE — MR AVS SNAPSHOT
After Visit Summary   5/17/2024    Elizabeth Tsai   MRN: [id number]           Visit Information     Date & Time  5/17/2024  2:00 PM Provider  Lynn Olivarez CNM Department  [facility] Phone  [phone]      Your Vitals Were  Most recent update: 5/17/2024  2:21 PM    BP   127/88 (BP Location: Right arm, Patient Position: Sitting, Cuff Size: large)    Pulse   96    Ht   70\"    Wt   247 lb 9.6 oz    BMI   35.53 kg/m²         Allergies as of 5/17/2024  Review status set to Review Complete on 5/17/2024   No Known Allergies     Your Current Medications        Dosage    Cariprazine HCl (VRAYLAR) 1.5 MG Oral Cap Take 1.5 mg by mouth once daily.    semaglutide 2 MG/3ML Subcutaneous Solution Pen-injector Inject 0.5 mg into the skin once a week.    DULoxetine 30 MG Oral Cap DR Particles Take 2 capsules (60 mg total) by mouth daily.    fenofibrate 145 MG Oral Tab Take 1 tablet (145 mg total) by mouth daily.    lisinopril-hydroCHLOROthiazide 20-25 MG Oral Tab TAKE ONE TABLET BY MOUTH ONE TIME DAILY.    Cholecalciferol (VITAMIN D) 50 MCG (2000 UT) Oral Tab Take by oral route 2000 units daily per    FreeStyle Lancets Does not apply Misc Test blood sugar daily as directed    Glucose Blood (FREESTYLE TEST) In Vitro Strip Test blood sugar daily as directed    FreeStyle System Does not apply Kit Test blood sugar daily as directed    OMEPRAZOLE 20 MG Oral Capsule Delayed Release TAKE ONE CAPSULE BY MOUTH IN THE MORNING BEFORE BREAKFAST      Diagnoses for This Visit    Encounter for gynecological examination with abnormal finding   [959188]  -  Primary  Vaginitis and vulvovaginitis   [838076]    Dysuria   [788.1.ICD-9-CM]    Menopause   [261913]    Pap smear for cervical cancer screening   [996939]    Routine screening for STI (sexually transmitted infection)   [689422]             Follow-up    Return in about 1 year (around 5/17/2025).     We Ordered the  Following     Normal Orders This Visit    Hpv Dna  High Risk , Thin Prep Collect [[id number] CUSTOM]     HSV 1/2 Subtype by PCR (Lesion only) [E] [[id number] CUSTOM]     Image-Guided Pap Smear ([facility]) [PWS0624 CPT(R)]     ThinPrep PAP Smear [[id number] CUSTOM]     Urine Culture, Routine [[id number] CUSTOM]     Vaginitis Vaginosis PCR Panel [[id number] CUSTOM]     Future Labs/Procedures Expected by Expires    Hpv Dna  High Risk , Thin Prep Collect [[id number] CUSTOM]  5/17/2024 5/17/2025    HSV 1/2 Subtype by PCR (Lesion only) [E] [[id number] CUSTOM]  5/17/2024 (Approximate) 5/17/2025    RUFUS LUIGI 2D+3D SCREENING BILAT (CPT=77067/97334) [COMBO CPT(R)]  5/17/2024 (Approximate) 5/17/2025    ThinPrep PAP Smear [[id number] CUSTOM]  5/17/2024 5/17/2025    Urine Culture, Routine [[id number] CUSTOM]  5/17/2024 (Approximate) 5/17/2025    Vaginitis Vaginosis PCR Panel [[id number] CUSTOM]  5/17/2024 (Approximate) 5/17/2025      Follow-up Instructions    Return in about 1 year (around 5/17/2025).     Imaging Scheduling Instructions     Around May 17, 2024   Imaging:   RUFUS LUIGI 2D+3D SCREENING BILAT (CPT=77067/30320)    Instructions: To schedule a test at any [facility], call Central Scheduling at [phone], Monday through Friday between 7:30am to 6pm and on Saturday between 8am and 1pm.      [facility]    [facility] ([address] [facility])        [address]    [place], [place]          It is the patient's responsibility to check with and follow their insurance company's guidelines for prior authorization for this test.  You may be held responsible for payment in full if proper authorization is not acquired.  Please contact the Patient Business Office at [phone] if you have   any questions related to insurance coverage.  Thank you.    Children: Children under the age of 12 must have another adult caregiver with them. Please do not bring your child/children without a caregiver. Because of the highly sensitive  equipment and privacy of all our patients, children will not be permitted in the exam rooms, unless otherwise noted and in   accordance with departmental policy.        Instructions      Why Have a Pap Test?  Early on, cervical changes don't cause symptoms. Often, the only way to know you have cervical changes is to do a Pap test. A Pap test can find these problems early, when they are easier to treat. Pap tests can also detect some infections of the cervix and vagina.  What is a Pap test?  A Pap test is a procedure that helps find changes in the cervix that may lead to cancer. The cervix is the part of the uterus that opens into the vagina. For this test, a small sample of cells is taken from the cervix. This is done in your healthcare provider’s office. The cells are then analyzed in a lab. A Pap test is a safe procedure. It takes just a few minutes and causes little or no discomfort.  The HPV connection  Human papillomavirus or HPV is a family of viruses that spread through skin contact. Certain types are almost always spread through sexual contact. Some HPV types cause genital warts (condyloma). But not all types of HPV cause visible symptoms. Certain types cause cell changes (dysplasia) in the cervix that can lead to cancer. In fact, HPV infection is the most important risk factor for cervical cancer. Healthcare providers can now test for HPV. Testing for HPV is often done with the Pap test. That’s why it’s important to have Pap tests as recommended by your healthcare provider. This helps ensure that any abnormal cells will be found and treated before they become cancerous.  Who should have a Pap test and HPV test?  Ask your healthcare provider when to start having Pap tests, whether you should have an HPV test done at the same time, and how often to have them. Follow these guidelines from the American Cancer Society for cervical cancer screening:  A first Pap test at age 21. And then every 3 years until age  29, HPV testing is not recommended during this time, though it may be done to follow-up on an abnormal Pap test.  Starting at age 30, the preferred testing is a Pap test done with an HPV test every 5 years. This should be done until age 65. Another option for women in this 30 to 65 age group is to have just the Pap test done every 3 years.  You may need a different screening schedule if you are at high risk for cervical cancer. Risk factors include having HIV, a weak immune system, or exposure to the medicine MIKE while your mother was pregnant with you. Talk with your healthcare provider about the best schedule for you.  If you’re over 65 and have had regular screenings for the last 10 years with no abnormal results in the last 20 years, you may stop cervical cancer screening.  If you had a hysterectomy that included removing your cervix, you can stop screening unless the hysterectomy was done to treat cervical cancer or pre-cancer. If you still have your cervix after the hysterectomy, you should continue screening according to the above guidelines.  Routine testing does not need to be done each year. However, if your test is abnormal, your provider will let you know how often to be tested.   Women who have been vaccinated for HPV should still follow these guidelines.  If you have had cervical cancer, talk to your healthcare provider about the screening plan that's best for you.  [employer] last reviewed this educational content on 9/1/2017 © 2000-2020 The [employer]. [address] [place] [address]. All rights reserved. This information is not intended as a substitute for professional medical care. Always follow your healthcare professional's instructions.        Breast Health: Breast Self-Awareness  What is breast self-awareness?  Breast self-awareness is knowing how your breasts normally look and feel. Your breasts change as you go through different stages of your life. So it’s important to  learn what is normal for your breasts. Knowing about your breasts helps you spot any changes in them right away. Tell your healthcare provider about any changes.   Why is breast self-awareness important?   Many experts now say that women should focus on breast self-awareness instead of doing a breast self-examination (BSE). These experts include the American Cancer Society and the American Congress of Obstetricians and Gynecologists. Some experts even advise not teaching women to do a BSE. That’s because research hasn’t shown a clear benefit to doing BSEs.   Breast self-awareness is different than a BSE. It isn’t about following a certain method and schedule. It’s about knowing what's normal for your breasts. That way you can spot even small changes right away. If you see any changes, tell your healthcare provider.   Changes to look for  Call your healthcare provider if you find any changes in your breasts that worry you. These changes may be:   A lump  Nipple discharge other than breast milk, especially if it's bloody  Swelling  A change in size or shape  Skin changes, such as redness, thickening, or dimpling of the skin  Swollen lymph nodes in the armpit  Nipple problems, such as pain or redness  If you find a lump  Call your provider if you find lumpiness in one breast. Also call if you feel something different in the tissue or feel a definite lump. Sometimes lumpiness may be due to menstrual changes. But there may be reason for concern.   Your provider may want to see you right away if you have:   Nipple discharge that is bloody  Skin changes on your breast, such as dimpling or puckering  It’s okay to be upset if you find a lump. Be sure to call your provider right away. Remember that most breast lumps are benign. This means they are not cancer.   [employer]. last reviewed this educational content on 5/1/2020 © 2000-2020 The [employer]. [address] [facility], [place] [address]. All rights reserved.  This information is not intended as a substitute for professional medical care. Always follow your healthcare professional's instructions.        Understanding STIs    When it comes to sex, nothing is risk-free. Any sexual contact with the penis, vagina, anus, or mouth can spread a sexually transmitted infection (STI). These include chlamydia, gonorrhea, herpes, HIV, and genital warts. STIs are also known as sexually transmitted diseases (STDs). The only sure way to prevent STIs is not having sex (abstinence). But there are ways to make sex safer. Use a latex condom each time you have sex. And choose your partner wisely.  Use condoms for safer sex  If you have sex, latex condoms provide the best protection against STIs. Latex condoms stop the exchange of body fluids that carry certain STIs. They also limit contact with affected skin. Be aware that a condom doesn’t cover all skin. So affected skin that isn't covered can still transfer disease. But you’re safer with a condom than without one. Use a condom even if you use other birth control. Birth control methods such as the pill or IUD help prevent pregnancy, but they don't protect against STIs.  Choose the right condom  Condoms made of latex prevent disease best. If you’re allergic to latex, use polyurethane condoms instead. Male condoms fit over the penis. Female condoms line the vagina. Before buying a condom, read the label to be sure it prevents disease. Some novelty condoms don’t.  The right lubricant helps  Buy lubricated condoms or use lubricant. This provides greater comfort and reduces the risk for condom breakage. Use only water-based lubricants. Don’t use oil, lotion, or petroleum jelly. They can weaken the condom, causing breakage. Also, you may want to choose lubricants without nonoxynol-9. This spermicide may cause irritation. It can raise the risk for certain STIs.  Use condoms correctly  For condoms to work, they must be used the right way. Keep  these tips in mind:  Use a new latex condom each time you have sex. Slip the condom on the penis before any contact is made.  When ready to withdraw, hold the rim of the condom as the penis pulls out. This prevents the condom from slipping off.  Check the expiration date before using a condom.  Don’t store condoms in places that can get hot, such as a car or a wallet that is carried in a back pocket.  Get to know your partner  Safer sex is a process. It involves getting to know your partner and making informed choices. Ask each other how many partners you have had in the past, and how many you have now. Find out if either of you has HIV or any other STI. If you decide to have sex, use a condom each time. Don’t stop using condoms unless you’re sure neither of you has other partners and you’ve both been tested to confirm you don’t have HIV or other STIs. Then stay free of disease by having sex only with each other (monogamy).  Keep your cool  Don’t let alcohol or drugs cloud your judgment. They could lead you to have sex with someone you wouldn’t have chosen if you were sober. Or you might forget to use a condom. If you do plan to have sex, keep a latex condom with you. Don’t wait until you’re in the heat of passion to try to find one.  Consider abstinence  The only way to be sure you won’t get an STI is to abstain from sex. Abstinence is a choice that many people make at some point in their life. Maybe you want to wait until you are sure you’re ready before you have sex. Maybe you’d like a break from the responsibilities of sex for a while. Or maybe you just want to know your partner better before taking the next step. Abstinence is a choice you can make now to protect your future.  [employer] last reviewed this educational content on 12/1/2018  © 1598-5487 The [employer]. [address] [facility], [place] [address]. All rights reserved. This information is not intended as a substitute for professional medical  care. Always follow your healthcare professional's instructions.        Understanding Body Mass Index (BMI)   Body mass index (BMI) is a way to figure out your weight category. It uses the ratio of your height to your weight. The BMI is a measure of your weight that is corrected for height. Knowing your BMI is a way to tell if you are at a healthy weight, are underweight, or overweight. The higher your BMI, the greater your risk for weight-related health problems.  What BMI means for adults  BMI below 18.5: Underweight  BMI 18.5 to 24.9: Healthy weight or ideal body weight   BMI 25 to 29.9: Overweight  BMI 30 and over: Obese  BMI 40 and over: Severe obesity        Find your BMI with an online BMI calculator tool, such as these from the CDC:  BMI calculator for adults  BMI calculator for children and teens  Using the BMI chart  To figure out your BMI, find your height and weight (or the numbers closest to them) on the table below. Follow each column of numbers to where your height and weight meet on the table. That is your BMI.    [employer] last reviewed this educational content on 9/1/2019 © 2000-2020 [employer]. [address] [place] [address]. All rights reserved. This information is not intended as a substitute for professional medical care. Always follow your healthcare professional's instructions.        Obesity and Its Impact on Health  Obesity is a major health problem in the [place] and all over the world. It affects nearly 2 out of 5 [place] adults.  What is obesity?  Obesity is a term used to describe a body weight that is above a normal weight for a specific height. Obesity is measured by using BMI (body mass index). BMI is a formula that uses a person’s weight divided by their height. BMI may be used to screen for weight problems but it’s important to know that BMI does not diagnose health problems or a person’s body fat. A high BMI number can mean high body fat. A BMI between 18.5 and  25 is normal. A BMI between 25 and 30 falls within the overweight range. A BMI 30 or higher is within the obese range. A BMI of 40 or more is considered extreme or severe obesity.  Why is obesity a problem?  Carrying too much weight can increase your risk for many serious health issues. These include problems with your heart, lungs, blood vessels, brain, and joints. Some of the problems that can happen include:  Heart and circulation problems. These include heart disease, high blood pressure, heart rhythm problems (atrial fibrillation), and stroke  Type 2 diabetes  Certain cancers, such as colon and breast cancer  Sleep apnea and other breathing problems.  Back or joint problems, such as osteoarthritis and gout  Digestive tract problems such as gallstones and GERD (gastroesophageal reflux disease)  Depression (with severe obesity)  Carrying too much weight can also affect your quality of life. And it can keep you from doing things you want or need to do.  How can you lower your risk for problems?  The key to lowering your risk for health problems from obesity is to manage your weight. If you are overweight or obese, the first step is to lose weight. Studies show that losing as little as 5 percent of your body weight is good for your health.  An important part of losing weight involves developing health habits and behaviors. Here are some tips:  Control how much you eat. Food is your body’s way to get energy (calories). But if you take in more calories than your body uses, you’ll gain weight. Know your eating habits and keep your portions reasonable.  Make healthy eating choices. Choose foods with many nutrients that give your body the energy it needs without adding extra pounds (kilograms). Also limit foods with added sugar and fats.  Be more active. Exercise burns calories, which can help you manage your weight. Increase your daily movement, add aerobic activity, and include strength training.  Talk with your  healthcare provider. Ask about working with a dietitian, health , exercise physiologist, or mental health provider who can support and encourage you.     Adding exercise to your day can help you control your weight.        If you have trouble losing weight, your healthcare provider may suggest medicines to help you. Weight loss (bariatric) surgery may also be an option for adults who have:  A BMI of 40 or more, or who are more than 100 pounds (45.4 kg) overweight  A BMI of 35 to less than 40 and a serious health problem such as type 2 diabetes, high blood pressure, or sleep apnea.  Not been able to stay at a healthy weight for a period of time in spite of efforts to lose weight through diet, exercise, or medicines  [employer] - /l?k â€¢ splik/ last reviewed this educational content on 3/1/2020  © 9284-0758 The [employer]. [address] [place] [address]. All rights reserved. This information is not intended as a substitute for professional medical care. Always follow your healthcare professional's instructions.        Exercise: Making the Most of Your Time  Your exercise goal is 30 minutes on most days. Aim for a total of 150 or more minutes a week. Not sure you can fit one 30-minute block of exercise time into your day? Split it up into shorter 10- and 15-minute blocks. Do this 2 to 3 times a day. You'll still get all of the benefits of exercise.    Use your whole body  Aerobic exercise works your heart and lungs. Some examples are walking, running, and cycling. Try adding a few other activities, too. This can help you strengthen and stretch many different muscles in your body.  Strengthen your:  Lower legs. Go up and down slowly on your toes, while you’re filing papers or washing dishes.  Upper legs. Lower yourself slowly into a chair without using your arms.  Stretch your:  Back. After you get up from a chair, place your palms on your low back and lean your upper body back.  Shoulders and chest. Lift your  arms overhead and reach tall while waiting for your computer to warm up.  Lower legs. Raise your toes and press them against a wall (with your heel on the ground).  Find a few extra minutes  Try these tips to add some extra exercise and activity to your day:  At work. Pick a lunch spot a few blocks away and walk there and back. Take a brisk walk on your break.  At home. Ride bikes with your kids. Use an exercise bike in the living room while you watch TV.  On errands. Park a few blocks away from where you need to go and walk there. Power-walk in malls by doing a fast lap or two before shopping.  At play. Go hiking with friends instead of sitting on a bench. Walk through a street fair instead of sitting in a movie.  Tips for sticking with it  Plan your activity by writing it on a calendar.  Find a austin at work to walk with during a lunch break.  Take your kids with you on a short walk after dinner.  Post a reminder list of the benefits of being active where you can see it.  Set an alarm to tell you when it’s time for an activity break.   [employer] last reviewed this educational content on 3/1/2018  © 2274-5583 The [employer]. [address] [place] [address], [place] [address]. All rights reserved. This information is not intended as a substitute for professional medical care. Always follow your healthcare professional's instructions.        4 Steps for Eating Healthier  Changing the way you eat can improve your health. It can lower your cholesterol and blood pressure, and help you stay at a healthy weight. Your diet doesn’t have to be bland and boring to be healthy. Just watch your calories and follow these steps:    Step 1. Eat fewer unhealthy fats  Choose more fish and lean meats instead of fatty cuts of meat.  Skip butter and lard, and use less margarine.  Pass on foods that have palm, coconut, or hydrogenated oils.  Eat fewer high-fat dairy foods like cheese, ice cream, and whole milk.  Get a heart-healthy  cookbook and try some low-fat recipes.    Step 2. Go light on salt  Keep the saltshaker off the table.  Limit high-salt ingredients, such as soy sauce, bouillon, and garlic salt.  Instead of adding salt when cooking, season your food with herbs and flavorings. Try lemon, garlic, and onion, or salt-free herb seasonings.  Limit convenience foods, such as boxed or canned foods and restaurant food.  Read food labels and choose lower-sodium options.    Step 3. Limit sugar  Pause before you add sugars to pancakes, cereal, coffee, or tea. This includes white and brown table sugar, syrup, honey, and molasses. Cut your usual amount by half.  Use non-sugar sweeteners. Stevia, aspartame, and sucralose can satisfy a sweet tooth without adding calories.  Swap out sugar-filled soda and other drinks. Buy sugar-free or low-calorie beverages. Remember water is always the best choice.  Read labels and choose foods with less added sugar. Keep in mind that dairy foods and foods with fruit will have some natural sugar.  Cut the sugar in recipes by 1/3 to 1/2. Boost the flavor with extracts like almond, vanilla, or orange. Or add spices such as cinnamon or nutmeg.    Step 4. Eat more fiber  Eat fresh fruits and vegetables every day.  Boost your diet with whole grains. Go for oats, whole-grain rice, and bran.  Add beans and lentils to your meals.  Drink more water to match your fiber increase to help prevent constipation.    [employer] last reviewed this educational content on 6/1/2017 © 2000-2020 The [employer], [employer]. [address] [place] [address], [place] [address]. All rights reserved. This information is not intended as a substitute for professional medical care. Always follow your healthcare professional's instructions.        Eating Healthy on the Run     Many grocery stores stock pre-made salads for eating on the run.     Need to eat on the run? This often means grabbing \"junk\" or fast food full of fat, salt, sugar, and cholesterol.  But being in a rush doesn't mean that you can't eat healthy.  \"Fast\" food made healthy  Try these ways to get good nutrition, fast.  Go to a grocery or convenience market instead of a fast-food restaurant. Look for choices like sandwiches, yogurt, fresh fruit, and juices.  Buy precut, prepackaged fresh or frozen fruits and vegetables. You can use them for a snack, salad, smoothie, or stir-dockery.  Microwave a frozen dinner that has less than 15 grams (g) of fat and less than 800 milligrams (mg) of sodium. Complete the meal with a whole-grain roll, vegetables, and fresh fruit.  If you must have fast food, consider your options. Go for veggie burgers, broiled and skinless chicken breast sandwiches, or dinner salads with low-fat dressing. Avoid large (super-sized) portions.  Blot the extra oil from food with a napkin before you eat it.  Instead of french fries, choose a baked potato with salsa.  Tip  Fast-food restaurants often have printed nutrition information available. Ask for this information and look up your favorite items before you order.   [employer] last reviewed this educational content on 6/1/2017  © 7056-5566 [employer]. [address] [place] [address], [place] [address]. All rights reserved. This information is not intended as a substitute for professional medical care. Always follow your healthcare professional's instructions.        Eating Healthy: Good Nutrition Quiz  To test your nutrition knowledge, answer the questions below as either True or False.     True False    []  []  1. There are many non-dairy sources of calcium.   []  []  2. Low-fat foods are always better.   []  []  3. Fiber isn’t important.   []  []  4. You need to watch portion sizes only when eating at home.   []  []  5. The outside aisles of the grocery store are the best places to shop.       Answers  TRUE. While dairy products have the most calcium, you can also get this mineral from other foods, too. Try calcium-fortified juices,  cereals, breads, rice milk, or almond milk. Other sources of calcium are canned fish, some beans, and dark, leafy greens. Soybeans and some soy items are also good options. These include soy yogurt, tempeh, and tofu made with calcium sulfate.  FALSE. Just because a food is low-fat or fat-free does not mean it’s always a better nutritional choice. For instance, fat-free cookies have the same amount of sugar and calories, or often even more, than regular cookies. Read labels.  FALSE. High-fiber foods help keep your digestive system healthy. Try to get 25 to 38 grams of fiber a day. Also, remember to drink plenty of water to prevent constipation.  FALSE. Portion control is just as important when you’re eating out. Many restaurants serve extra-large portions. So split your entree with someone at the table. Or ask for a take-home box. Then put half of your entree in it right away, before you start eating.  TRUE. This is where the fresh foods tend to be. These include fruits, vegetables, grains, and dairy. Processed foods are more likely to be on the inside aisles. When shopping these aisles, read labels extra carefully.  [employer] last reviewed this educational content on 6/1/2017 © 2000-2020 [employer]. [address] [place] [address], [place] [address]. All rights reserved. This information is not intended as a substitute for professional medical care. Always follow your healthcare professional's instructions. Prevention Guidelines, Women Ages 40 to 49  Screening tests and vaccines are an important part of managing your health. A screening test is done to find diseases in people who don't have any symptoms. The goal is to find a disease early so lifestyle changes and checkups can reduce the risk of disease. Or the goal may be to detect it early to treat it most effectively. Screening tests are not used to diagnose a disease. But they are used to see if more testing is needed. Health counseling is important, too.  Below are guidelines for these, for women ages 40 to 49. Talk with your healthcare provider to make sure you’re up-to-date on what you need.  Screening Who needs it How often   Type 2 diabetes or prediabetes All women beginning at age 45 and women without symptoms at any age who are overweight or obese and have 1 or more additional risk factors for diabetes At least every 3 years1   Type 2 diabetes or prediabetes All women diagnosed with gestational diabetes Lifelong testing every 3 years   Type 2 diabetes All women with prediabetes Every year   Alcohol misuse All women in this age group At routine exams   Blood pressure All women in this age group Yearly checkup if your blood pressure is normal  Normal blood pressure is less than 120/80 mm Hg  If your blood pressure reading is higher than normal, follow the advice of your healthcare provider   Breast cancer All women at average risk in this age group Screening with a mammogram can start at age 40.2 Talk with your healthcare provider to help you decide when to start screening. At age 45 start yearly mammograms.3   Cervical cancer All women in this age group, except women who have had a complete hysterectomy Pap test every 3 years or Pap test plus human papilloma virus (HPV) test every 5 years   Colorectal cancer Women age 45 years and older at average risk Multiple tests are available and are used at different times. Possible tests include:  Flexible sigmoidoscopy every 5 years, or  Colonoscopy every 10 years, or  CT colonography (virtual colonoscopy) every 5 years, or  Yearly fecal occult blood test, or  Yearly fecal immunochemical test every year, or  Stool DNA test, every 3 years  If you choose a test other than a colonoscopy and have an abnormal test result, you will need to follow-up with a colonoscopy. Screening advice varies among expert groups. Talk with your healthcare provider about which tests are best for you.  Some people should be screened using a  different schedule because of their personal or family health history. Talk with your healthcare provider about your health history.   Chlamydia Women at increased risk for infection At routine exams if you're at risk or have symptoms   Depression All women in this age group At routine exams   Gonorrhea Sexually active women at increased risk for infection At routine exams   Hepatitis C Anyone at increased risk; 1 time for those born between 1945 and 1965 At routine exams   High cholesterol or triglycerides All women ages 45 and older who are at risk for coronary artery disease; younger women, talk with your healthcare provider At least every 5 years   HIV All women At routine exams. Those with risk factors for HIV should be tested at least annually.   Obesity All women in this age group At routine exams   Syphilis Women at increased risk for infection-talk with your healthcare provider At routine exams   Tuberculosis Women at increased risk for infection-talk with your healthcare provider Ask your healthcare provider   Vision All women in this age group Complete exam at age 40 and eye exams every 2 to 4 years. If you have a chronic disease, ask your healthcare provider how often you should have your eyes examined.4   Vaccine Who needs it How often   Chickenpox (varicella) All women in this age group who have no record of this infection or vaccine 2 doses; the second dose should be given at least 4 weeks after the first dose   Hepatitis A Women at increased risk for infection-talk with your healthcare provider 2 doses given 6 months apart   Hepatitis B Women at increased risk for infection-talk with your healthcare provider 3 doses over 6 months; second dose should be given 1 month after the first dose; the third dose should be given at least 2 months after the second dose and at least 4 months after the first dose   Haemophilus influenzae Type B (HIB) Women at increased risk 1 to 3 doses   Influenza (flu) All women  in this age group Once a year   Measles, mumps, rubella (MMR) All women in this age group who have no record of these infections or vaccines 1 or 2 doses   Meningococcal Women at increased risk for infection-talk with your healthcare provider 1 or more doses   Pneumococcal conjugate vaccine (PCV13) and pneumococcal polysaccharide vaccine (PPSV23) Women at increased risk for infection-talk with your healthcare provider 1 or 2 doses   Tetanus/diphtheria/pertussis (Td/Tdap) booster All women in this age group A 1-time dose of Tdap instead of a Td booster after age 18, then Td every 10 years   Counseling Who needs it How often   BRCA gene mutation testing for breast and ovarian cancer susceptibility Women with increased risk for having gene mutation When your risk is known   Breast cancer and chemoprevention Women at high risk for breast cancer When your risk is known   Diet and exercise Women who are overweight or obese When diagnosed, and then at routine exams   Domestic violence Women at the age in which they are able to have children At routine exams   Sexually transmitted infection prevention Women at increased risk for infection-talk with your healthcare provider At routine exams   Use of tobacco and the health effects it can cause All women in this age group Every exam   1 American Diabetes Association  2 American College of Obstetricians and Gynecologists   3 American Cancer Society  4 American Academy of Ophthalmology  [employer] last reviewed this educational content on 11/1/2017  © 8672-2918 [employer]. All rights reserved. This information is not intended as a substitute for professional medical care. Always follow your healthcare professional's instructions.                              Did you know that [facility] physicians now offer Video Visits through [employer] for adult patients for a variety of conditions such as allergies, back pain and cold symptoms? Skip the drive and waiting room  and online chat with a doctor face-to-face using your web-cam enabled computer or mobile device wherever you are. Video Visits cost $50 and can be paid hassle-free using a credit, debit, or health savings card.  Not active on [employer]? Ask us how to get signed up today!          If you receive a survey from Christina Acosta, please take a few minutes to complete it and provide feedback. We strive to deliver the best patient experience and are looking for ways to make improvements. Your feedback will help us do so. For more information on Christina Acosta, please visit [email or web address]           No text in SmartText           No text in SmartText

## (undated) NOTE — LETTER
6/21/2023          To Whom It May Concern:    Austyn Khan is currently under my medical care and may not return to work at this time. Please excuse Rasta Dumas for  6/13/2023 to 6/16/2023. She may return to work on 6/18/2023. Activity is restricted as follows: none. If you require additional information please contact our office.         Sincerely,    Jak Nunez MD          Document generated by:  Jak Nunez MD

## (undated) NOTE — LETTER
6/14/2023          To Whom It May Concern:    Araceli Meier is currently under my medical care and may not return to work at this time. Please excuse Taya Calle for 6/14/2023  Activity is restricted as follows: none. If you require additional information please contact our office.         Sincerely,    Moi Bermudez MD          Document generated by:  Moi Bermudez MD

## (undated) NOTE — LETTER
12/1/2020          To Whom It May Concern:    Dayday Jones is currently under my medical care and may not return to work at this time. Please excuse Pelon Gemma for 4 days. She may return to work on 12/2/2020 .   Activity is restricted as follows: non

## (undated) NOTE — LETTER
12/30/2022          To Whom It May Concern:    Rachell Scott is currently under my medical care and may not return to work at this time. Please excuse Paris Lomeli for 3 days. She may return to work on 1/2/2023. Activity is restricted as follows: none. If you require additional information please contact our office.         Sincerely,        MINA Mccray          Document generated by:  MINA Mccray

## (undated) NOTE — LETTER
3/11/2023          To Whom It May Concern:    Reza Quesada is currently under my medical care and was seen today on 3/11/23. She tested positive for covid on 3/11/23. Please excuse her for 3/10-3/14/23. She is ok to return to work on 3/15/23. If you require additional information please contact our office.         Sincerely,    Beau Helms PA-C            Document generated by:  Beau Helms PA-C

## (undated) NOTE — LETTER
6/12/2024        Elizabeth Tsai        [address], [place] [address]         To Whom It May Concern,    Elizabeth Tsai is under my medical care, medical condition started 6/9/24 and incapable of performing job duties. She is able to return to work on 6/14/24    Sincerely,    MINA Petty  [address] [address]  Ph: [phone]  Fax: [phone]

## (undated) NOTE — LETTER
11/2/2020          To Whom It May Concern:    Kervin Loo is currently under my medical care and may not return to work at this time. Please excuse her from 11/3/20-11/5/20. If you require additional information please contact our office.         Sin

## (undated) NOTE — LETTER
Date & Time: 12/30/2023, 3:50 PM  Patient: Doug Scruggs  Encounter Provider(s):    MINA Naranjo       To Whom It May Concern:    Vilma Mendez was seen and treated in our department on 12/30/2023. She should not return to work until 12/31/2023 . Please excuse her from work 12/29/23 and 12/30/23 as she is being treated for a medical condition that caused her to not be able to perform her duties.     If you have any questions or concerns, please do not hesitate to call.        _____________________________  Physician/APC Signature